# Patient Record
Sex: MALE | Race: WHITE | NOT HISPANIC OR LATINO | Employment: OTHER | URBAN - METROPOLITAN AREA
[De-identification: names, ages, dates, MRNs, and addresses within clinical notes are randomized per-mention and may not be internally consistent; named-entity substitution may affect disease eponyms.]

---

## 2018-06-15 ENCOUNTER — HOSPITAL ENCOUNTER (INPATIENT)
Facility: HOSPITAL | Age: 78
LOS: 19 days | Discharge: HOME | End: 2018-07-04
Attending: PHYSICAL MEDICINE & REHABILITATION | Admitting: PHYSICAL MEDICINE & REHABILITATION

## 2018-06-15 DIAGNOSIS — R47.1 DYSARTHRIA: ICD-10-CM

## 2018-06-15 RX ORDER — ONDANSETRON 4 MG/1
4 TABLET, FILM COATED ORAL EVERY 6 HOURS PRN
Status: DISCONTINUED | OUTPATIENT
Start: 2018-06-15 | End: 2018-07-04

## 2018-06-15 RX ORDER — AMLODIPINE BESYLATE 10 MG/1
10 TABLET ORAL
Status: DISCONTINUED | OUTPATIENT
Start: 2018-06-16 | End: 2018-07-04 | Stop reason: HOSPADM

## 2018-06-15 RX ORDER — PAROXETINE 30 MG/1
15 TABLET, FILM COATED ORAL DAILY
Status: DISCONTINUED | OUTPATIENT
Start: 2018-06-16 | End: 2018-06-22

## 2018-06-15 RX ORDER — ATORVASTATIN CALCIUM 80 MG/1
80 TABLET, FILM COATED ORAL NIGHTLY
Status: DISCONTINUED | OUTPATIENT
Start: 2018-06-15 | End: 2018-06-15

## 2018-06-15 RX ORDER — LEVOTHYROXINE SODIUM 112 UG/1
112 TABLET ORAL
Status: DISCONTINUED | OUTPATIENT
Start: 2018-06-16 | End: 2018-07-04 | Stop reason: HOSPADM

## 2018-06-15 RX ORDER — NITROGLYCERIN 0.4 MG/1
0.4 TABLET SUBLINGUAL
Status: DISCONTINUED | OUTPATIENT
Start: 2018-06-15 | End: 2018-07-04 | Stop reason: HOSPADM

## 2018-06-15 RX ORDER — PANTOPRAZOLE SODIUM 40 MG/1
40 TABLET, DELAYED RELEASE ORAL EVERY MORNING
Status: DISCONTINUED | OUTPATIENT
Start: 2018-06-16 | End: 2018-07-04 | Stop reason: HOSPADM

## 2018-06-15 RX ORDER — PAROXETINE 10 MG/1
15 TABLET, FILM COATED ORAL DAILY
Status: DISCONTINUED | OUTPATIENT
Start: 2018-06-15 | End: 2018-06-15

## 2018-06-15 RX ORDER — UREA 10 %
3 LOTION (ML) TOPICAL NIGHTLY PRN
Status: DISCONTINUED | OUTPATIENT
Start: 2018-06-15 | End: 2018-07-04 | Stop reason: HOSPADM

## 2018-06-15 RX ORDER — ATORVASTATIN CALCIUM 80 MG/1
80 TABLET, FILM COATED ORAL NIGHTLY
Status: DISCONTINUED | OUTPATIENT
Start: 2018-06-16 | End: 2018-06-30

## 2018-06-15 RX ORDER — CETIRIZINE HYDROCHLORIDE 10 MG/1
10 TABLET ORAL DAILY
Status: DISCONTINUED | OUTPATIENT
Start: 2018-06-16 | End: 2018-07-04 | Stop reason: HOSPADM

## 2018-06-15 RX ORDER — DIPHENOXYLATE HYDROCHLORIDE AND ATROPINE SULFATE 2.5; .025 MG/1; MG/1
1 TABLET ORAL DAILY
Status: DISCONTINUED | OUTPATIENT
Start: 2018-06-15 | End: 2018-07-04 | Stop reason: HOSPADM

## 2018-06-15 RX ORDER — CETIRIZINE HYDROCHLORIDE 10 MG/1
10 TABLET ORAL DAILY
Status: DISCONTINUED | OUTPATIENT
Start: 2018-06-15 | End: 2018-06-15

## 2018-06-15 RX ORDER — LISINOPRIL 20 MG/1
20 TABLET ORAL EVERY 12 HOURS SCHEDULED
Status: DISCONTINUED | OUTPATIENT
Start: 2018-06-15 | End: 2018-07-04 | Stop reason: HOSPADM

## 2018-06-15 RX ORDER — AMLODIPINE BESYLATE 10 MG/1
10 TABLET ORAL
Status: DISCONTINUED | OUTPATIENT
Start: 2018-06-15 | End: 2018-06-15

## 2018-06-15 RX ADMIN — Medication 3 MG: at 20:48

## 2018-06-15 RX ADMIN — Medication 1 TABLET: at 20:42

## 2018-06-15 RX ADMIN — LISINOPRIL 20 MG: 20 TABLET ORAL at 20:38

## 2018-06-15 RX ADMIN — METOPROLOL TARTRATE 25 MG: 25 TABLET ORAL at 20:39

## 2018-06-16 PROBLEM — I63.9 CVA (CEREBRAL VASCULAR ACCIDENT) (HCC): Status: ACTIVE | Noted: 2018-06-16

## 2018-06-16 PROCEDURE — 97167 OT EVAL HIGH COMPLEX 60 MIN: CPT | Performed by: OCCUPATIONAL THERAPIST

## 2018-06-16 PROCEDURE — 92610 EVALUATE SWALLOWING FUNCTION: CPT

## 2018-06-16 PROCEDURE — 97535 SELF CARE MNGMENT TRAINING: CPT | Performed by: OCCUPATIONAL THERAPIST

## 2018-06-16 PROCEDURE — 92522 EVALUATE SPEECH PRODUCTION: CPT

## 2018-06-16 PROCEDURE — 97161 PT EVAL LOW COMPLEX 20 MIN: CPT

## 2018-06-16 PROCEDURE — 97110 THERAPEUTIC EXERCISES: CPT

## 2018-06-16 RX ADMIN — METOPROLOL TARTRATE 25 MG: 25 TABLET ORAL at 09:04

## 2018-06-16 RX ADMIN — METOPROLOL TARTRATE 25 MG: 25 TABLET ORAL at 20:32

## 2018-06-16 RX ADMIN — Medication 1 TABLET: at 09:04

## 2018-06-16 RX ADMIN — LISINOPRIL 20 MG: 20 TABLET ORAL at 20:35

## 2018-06-16 RX ADMIN — NYSTATIN 500000 UNITS: 100000 SUSPENSION ORAL at 20:32

## 2018-06-16 RX ADMIN — ATORVASTATIN CALCIUM 80 MG: 80 TABLET, FILM COATED ORAL at 20:32

## 2018-06-16 RX ADMIN — LISINOPRIL 20 MG: 20 TABLET ORAL at 09:04

## 2018-06-16 RX ADMIN — PANTOPRAZOLE SODIUM 40 MG: 40 TABLET, DELAYED RELEASE ORAL at 06:29

## 2018-06-16 RX ADMIN — LEVOTHYROXINE SODIUM 112 MCG: 112 TABLET ORAL at 06:29

## 2018-06-16 RX ADMIN — Medication 3 MG: at 20:32

## 2018-06-16 RX ADMIN — NYSTATIN 500000 UNITS: 100000 SUSPENSION ORAL at 17:56

## 2018-06-16 RX ADMIN — AMLODIPINE BESYLATE 10 MG: 10 TABLET ORAL at 09:04

## 2018-06-16 RX ADMIN — PAROXETINE 15 MG: 30 TABLET, FILM COATED ORAL at 09:04

## 2018-06-17 RX ADMIN — LISINOPRIL 20 MG: 20 TABLET ORAL at 20:15

## 2018-06-17 RX ADMIN — LEVOTHYROXINE SODIUM 112 MCG: 112 TABLET ORAL at 06:06

## 2018-06-17 RX ADMIN — METOPROLOL TARTRATE 25 MG: 25 TABLET ORAL at 20:15

## 2018-06-17 RX ADMIN — METOPROLOL TARTRATE 25 MG: 25 TABLET ORAL at 08:18

## 2018-06-17 RX ADMIN — ATORVASTATIN CALCIUM 80 MG: 80 TABLET, FILM COATED ORAL at 20:15

## 2018-06-17 RX ADMIN — AMLODIPINE BESYLATE 10 MG: 10 TABLET ORAL at 08:18

## 2018-06-17 RX ADMIN — LISINOPRIL 20 MG: 20 TABLET ORAL at 08:18

## 2018-06-17 RX ADMIN — NYSTATIN 500000 UNITS: 100000 SUSPENSION ORAL at 08:18

## 2018-06-17 RX ADMIN — PAROXETINE 15 MG: 30 TABLET, FILM COATED ORAL at 08:18

## 2018-06-17 RX ADMIN — Medication 1 TABLET: at 08:18

## 2018-06-17 RX ADMIN — PANTOPRAZOLE SODIUM 40 MG: 40 TABLET, DELAYED RELEASE ORAL at 07:44

## 2018-06-17 RX ADMIN — NYSTATIN 500000 UNITS: 100000 SUSPENSION ORAL at 20:15

## 2018-06-17 RX ADMIN — NYSTATIN 500000 UNITS: 100000 SUSPENSION ORAL at 13:03

## 2018-06-18 ENCOUNTER — APPOINTMENT (OUTPATIENT)
Dept: GENERAL RADIOLOGY | Facility: HOSPITAL | Age: 78
End: 2018-06-18

## 2018-06-18 PROCEDURE — 97110 THERAPEUTIC EXERCISES: CPT

## 2018-06-18 PROCEDURE — 74230 X-RAY XM SWLNG FUNCJ C+: CPT

## 2018-06-18 PROCEDURE — 97112 NEUROMUSCULAR REEDUCATION: CPT | Performed by: OCCUPATIONAL THERAPIST

## 2018-06-18 PROCEDURE — 94799 UNLISTED PULMONARY SVC/PX: CPT

## 2018-06-18 PROCEDURE — 92611 MOTION FLUOROSCOPY/SWALLOW: CPT | Performed by: SPEECH-LANGUAGE PATHOLOGIST

## 2018-06-18 PROCEDURE — 97535 SELF CARE MNGMENT TRAINING: CPT | Performed by: OCCUPATIONAL THERAPIST

## 2018-06-18 RX ORDER — ACETAMINOPHEN 325 MG/1
650 TABLET ORAL EVERY 6 HOURS PRN
Status: DISCONTINUED | OUTPATIENT
Start: 2018-06-18 | End: 2018-07-04 | Stop reason: HOSPADM

## 2018-06-18 RX ADMIN — BARIUM SULFATE 50 ML: 400 SUSPENSION ORAL at 10:46

## 2018-06-18 RX ADMIN — NYSTATIN 500000 UNITS: 100000 SUSPENSION ORAL at 16:52

## 2018-06-18 RX ADMIN — PANTOPRAZOLE SODIUM 40 MG: 40 TABLET, DELAYED RELEASE ORAL at 05:55

## 2018-06-18 RX ADMIN — CETIRIZINE HYDROCHLORIDE 10 MG: 10 TABLET, FILM COATED ORAL at 09:25

## 2018-06-18 RX ADMIN — LISINOPRIL 20 MG: 20 TABLET ORAL at 20:33

## 2018-06-18 RX ADMIN — BARIUM SULFATE 4 ML: 980 POWDER, FOR SUSPENSION ORAL at 10:46

## 2018-06-18 RX ADMIN — LEVOTHYROXINE SODIUM 112 MCG: 112 TABLET ORAL at 05:55

## 2018-06-18 RX ADMIN — NYSTATIN 500000 UNITS: 100000 SUSPENSION ORAL at 09:26

## 2018-06-18 RX ADMIN — LISINOPRIL 20 MG: 20 TABLET ORAL at 09:25

## 2018-06-18 RX ADMIN — NYSTATIN 500000 UNITS: 100000 SUSPENSION ORAL at 20:33

## 2018-06-18 RX ADMIN — BARIUM SULFATE 65 ML: 960 POWDER, FOR SUSPENSION ORAL at 10:46

## 2018-06-18 RX ADMIN — Medication 1 TABLET: at 09:25

## 2018-06-18 RX ADMIN — ATORVASTATIN CALCIUM 80 MG: 80 TABLET, FILM COATED ORAL at 20:33

## 2018-06-18 RX ADMIN — METOPROLOL TARTRATE 25 MG: 25 TABLET ORAL at 09:25

## 2018-06-18 RX ADMIN — PAROXETINE 15 MG: 30 TABLET, FILM COATED ORAL at 09:26

## 2018-06-18 RX ADMIN — ACETAMINOPHEN 650 MG: 325 TABLET, FILM COATED ORAL at 16:52

## 2018-06-18 RX ADMIN — AMLODIPINE BESYLATE 10 MG: 10 TABLET ORAL at 09:25

## 2018-06-18 RX ADMIN — NYSTATIN 500000 UNITS: 100000 SUSPENSION ORAL at 12:31

## 2018-06-18 RX ADMIN — Medication 3 MG: at 20:36

## 2018-06-18 RX ADMIN — METOPROLOL TARTRATE 25 MG: 25 TABLET ORAL at 20:33

## 2018-06-19 PROCEDURE — 97535 SELF CARE MNGMENT TRAINING: CPT

## 2018-06-19 PROCEDURE — 96125 COGNITIVE TEST BY HC PRO: CPT

## 2018-06-19 PROCEDURE — 97110 THERAPEUTIC EXERCISES: CPT

## 2018-06-19 PROCEDURE — 92522 EVALUATE SPEECH PRODUCTION: CPT

## 2018-06-19 PROCEDURE — 97112 NEUROMUSCULAR REEDUCATION: CPT

## 2018-06-19 RX ADMIN — PANTOPRAZOLE SODIUM 40 MG: 40 TABLET, DELAYED RELEASE ORAL at 07:07

## 2018-06-19 RX ADMIN — CETIRIZINE HYDROCHLORIDE 10 MG: 10 TABLET, FILM COATED ORAL at 08:25

## 2018-06-19 RX ADMIN — LEVOTHYROXINE SODIUM 112 MCG: 112 TABLET ORAL at 06:02

## 2018-06-19 RX ADMIN — LISINOPRIL 20 MG: 20 TABLET ORAL at 20:21

## 2018-06-19 RX ADMIN — ATORVASTATIN CALCIUM 80 MG: 80 TABLET, FILM COATED ORAL at 20:20

## 2018-06-19 RX ADMIN — METOPROLOL TARTRATE 25 MG: 25 TABLET ORAL at 20:20

## 2018-06-19 RX ADMIN — PAROXETINE 15 MG: 30 TABLET, FILM COATED ORAL at 08:24

## 2018-06-19 RX ADMIN — NYSTATIN 500000 UNITS: 100000 SUSPENSION ORAL at 17:23

## 2018-06-19 RX ADMIN — Medication 1 TABLET: at 08:25

## 2018-06-19 RX ADMIN — LISINOPRIL 20 MG: 20 TABLET ORAL at 08:28

## 2018-06-19 RX ADMIN — NYSTATIN 500000 UNITS: 100000 SUSPENSION ORAL at 12:13

## 2018-06-19 RX ADMIN — METOPROLOL TARTRATE 25 MG: 25 TABLET ORAL at 08:28

## 2018-06-19 RX ADMIN — NYSTATIN 500000 UNITS: 100000 SUSPENSION ORAL at 08:24

## 2018-06-19 RX ADMIN — NYSTATIN 500000 UNITS: 100000 SUSPENSION ORAL at 20:20

## 2018-06-19 RX ADMIN — AMLODIPINE BESYLATE 10 MG: 10 TABLET ORAL at 08:27

## 2018-06-20 PROCEDURE — G0515 COGNITIVE SKILLS DEVELOPMENT: HCPCS

## 2018-06-20 PROCEDURE — 97110 THERAPEUTIC EXERCISES: CPT

## 2018-06-20 PROCEDURE — 97530 THERAPEUTIC ACTIVITIES: CPT

## 2018-06-20 PROCEDURE — 97535 SELF CARE MNGMENT TRAINING: CPT

## 2018-06-20 PROCEDURE — 97112 NEUROMUSCULAR REEDUCATION: CPT

## 2018-06-20 RX ORDER — POTASSIUM CHLORIDE 750 MG/1
10 TABLET, FILM COATED, EXTENDED RELEASE ORAL 2 TIMES DAILY WITH MEALS
COMMUNITY
End: 2018-07-04 | Stop reason: HOSPADM

## 2018-06-20 RX ORDER — LISINOPRIL 20 MG/1
20 TABLET ORAL 2 TIMES DAILY
COMMUNITY

## 2018-06-20 RX ORDER — OMEPRAZOLE 20 MG/1
20 CAPSULE, DELAYED RELEASE ORAL DAILY
COMMUNITY

## 2018-06-20 RX ORDER — LISINOPRIL 40 MG/1
40 TABLET ORAL DAILY
COMMUNITY
End: 2018-07-04 | Stop reason: HOSPADM

## 2018-06-20 RX ORDER — LEVOTHYROXINE SODIUM 0.1 MG/1
100 TABLET ORAL DAILY
COMMUNITY
End: 2018-07-04 | Stop reason: HOSPADM

## 2018-06-20 RX ORDER — POLYMYXIN B SULFATE AND TRIMETHOPRIM 1; 10000 MG/ML; [USP'U]/ML
SOLUTION OPHTHALMIC
COMMUNITY
End: 2018-07-04 | Stop reason: HOSPADM

## 2018-06-20 RX ORDER — ASPIRIN 81 MG/1
81 TABLET ORAL
COMMUNITY
End: 2018-07-04 | Stop reason: HOSPADM

## 2018-06-20 RX ORDER — CETIRIZINE HYDROCHLORIDE 10 MG/1
10 TABLET ORAL DAILY
COMMUNITY

## 2018-06-20 RX ORDER — NITROGLYCERIN 0.4 MG/1
0.4 TABLET SUBLINGUAL
COMMUNITY

## 2018-06-20 RX ORDER — ATORVASTATIN CALCIUM 80 MG/1
80 TABLET, FILM COATED ORAL DAILY
COMMUNITY
End: 2018-07-04 | Stop reason: HOSPADM

## 2018-06-20 RX ORDER — CHLORTHALIDONE 25 MG/1
25 TABLET ORAL
COMMUNITY
End: 2018-07-04 | Stop reason: HOSPADM

## 2018-06-20 RX ORDER — PAROXETINE 10 MG/1
15 TABLET, FILM COATED ORAL EVERY MORNING
Status: ON HOLD | COMMUNITY
End: 2018-07-04

## 2018-06-20 RX ORDER — LEVOTHYROXINE SODIUM 112 UG/1
112 TABLET ORAL DAILY
COMMUNITY

## 2018-06-20 RX ORDER — HYDRALAZINE HYDROCHLORIDE 10 MG/1
10 TABLET, FILM COATED ORAL EVERY 6 HOURS PRN
Status: DISCONTINUED | OUTPATIENT
Start: 2018-06-20 | End: 2018-07-04

## 2018-06-20 RX ADMIN — METOPROLOL TARTRATE 25 MG: 25 TABLET ORAL at 21:03

## 2018-06-20 RX ADMIN — LISINOPRIL 20 MG: 20 TABLET ORAL at 21:03

## 2018-06-20 RX ADMIN — NYSTATIN 500000 UNITS: 100000 SUSPENSION ORAL at 08:49

## 2018-06-20 RX ADMIN — PAROXETINE 15 MG: 30 TABLET, FILM COATED ORAL at 08:49

## 2018-06-20 RX ADMIN — CETIRIZINE HYDROCHLORIDE 10 MG: 10 TABLET, FILM COATED ORAL at 08:49

## 2018-06-20 RX ADMIN — NYSTATIN 500000 UNITS: 100000 SUSPENSION ORAL at 12:43

## 2018-06-20 RX ADMIN — Medication 3 MG: at 21:03

## 2018-06-20 RX ADMIN — LISINOPRIL 20 MG: 20 TABLET ORAL at 08:49

## 2018-06-20 RX ADMIN — ACETAMINOPHEN 650 MG: 325 TABLET, FILM COATED ORAL at 08:52

## 2018-06-20 RX ADMIN — NYSTATIN 500000 UNITS: 100000 SUSPENSION ORAL at 21:03

## 2018-06-20 RX ADMIN — PANTOPRAZOLE SODIUM 40 MG: 40 TABLET, DELAYED RELEASE ORAL at 06:49

## 2018-06-20 RX ADMIN — Medication 1 TABLET: at 08:49

## 2018-06-20 RX ADMIN — AMLODIPINE BESYLATE 10 MG: 10 TABLET ORAL at 08:49

## 2018-06-20 RX ADMIN — NYSTATIN 500000 UNITS: 100000 SUSPENSION ORAL at 17:32

## 2018-06-20 RX ADMIN — ATORVASTATIN CALCIUM 80 MG: 80 TABLET, FILM COATED ORAL at 21:03

## 2018-06-20 RX ADMIN — ACETAMINOPHEN 650 MG: 325 TABLET, FILM COATED ORAL at 15:25

## 2018-06-20 RX ADMIN — LEVOTHYROXINE SODIUM 112 MCG: 112 TABLET ORAL at 05:35

## 2018-06-20 RX ADMIN — METOPROLOL TARTRATE 25 MG: 25 TABLET ORAL at 08:49

## 2018-06-21 ENCOUNTER — APPOINTMENT (OUTPATIENT)
Dept: CT IMAGING | Facility: HOSPITAL | Age: 78
End: 2018-06-21
Attending: PHYSICAL MEDICINE & REHABILITATION

## 2018-06-21 PROCEDURE — 92507 TX SP LANG VOICE COMM INDIV: CPT

## 2018-06-21 PROCEDURE — 70450 CT HEAD/BRAIN W/O DYE: CPT

## 2018-06-21 PROCEDURE — 92526 ORAL FUNCTION THERAPY: CPT

## 2018-06-21 PROCEDURE — 97112 NEUROMUSCULAR REEDUCATION: CPT | Performed by: OCCUPATIONAL THERAPIST

## 2018-06-21 PROCEDURE — 97535 SELF CARE MNGMENT TRAINING: CPT

## 2018-06-21 PROCEDURE — 97110 THERAPEUTIC EXERCISES: CPT

## 2018-06-21 RX ORDER — HYDRALAZINE HYDROCHLORIDE 10 MG/1
10 TABLET, FILM COATED ORAL EVERY 8 HOURS SCHEDULED
Status: DISCONTINUED | OUTPATIENT
Start: 2018-06-21 | End: 2018-06-22

## 2018-06-21 RX ADMIN — PAROXETINE 15 MG: 30 TABLET, FILM COATED ORAL at 07:52

## 2018-06-21 RX ADMIN — NYSTATIN 500000 UNITS: 100000 SUSPENSION ORAL at 11:03

## 2018-06-21 RX ADMIN — METOPROLOL TARTRATE 25 MG: 25 TABLET ORAL at 20:36

## 2018-06-21 RX ADMIN — NYSTATIN 500000 UNITS: 100000 SUSPENSION ORAL at 07:52

## 2018-06-21 RX ADMIN — CETIRIZINE HYDROCHLORIDE 10 MG: 10 TABLET, FILM COATED ORAL at 07:52

## 2018-06-21 RX ADMIN — AMLODIPINE BESYLATE 10 MG: 10 TABLET ORAL at 07:52

## 2018-06-21 RX ADMIN — NYSTATIN 500000 UNITS: 100000 SUSPENSION ORAL at 17:29

## 2018-06-21 RX ADMIN — ATORVASTATIN CALCIUM 80 MG: 80 TABLET, FILM COATED ORAL at 20:36

## 2018-06-21 RX ADMIN — LISINOPRIL 20 MG: 20 TABLET ORAL at 20:36

## 2018-06-21 RX ADMIN — NYSTATIN 500000 UNITS: 100000 SUSPENSION ORAL at 20:37

## 2018-06-21 RX ADMIN — METOPROLOL TARTRATE 25 MG: 25 TABLET ORAL at 07:52

## 2018-06-21 RX ADMIN — Medication 1 TABLET: at 07:52

## 2018-06-21 RX ADMIN — LISINOPRIL 20 MG: 20 TABLET ORAL at 07:52

## 2018-06-21 RX ADMIN — PANTOPRAZOLE SODIUM 40 MG: 40 TABLET, DELAYED RELEASE ORAL at 05:26

## 2018-06-21 RX ADMIN — LEVOTHYROXINE SODIUM 112 MCG: 112 TABLET ORAL at 05:26

## 2018-06-21 RX ADMIN — HYDRALAZINE HYDROCHLORIDE 10 MG: 10 TABLET ORAL at 21:44

## 2018-06-22 PROCEDURE — 92507 TX SP LANG VOICE COMM INDIV: CPT

## 2018-06-22 PROCEDURE — 97110 THERAPEUTIC EXERCISES: CPT

## 2018-06-22 PROCEDURE — 97535 SELF CARE MNGMENT TRAINING: CPT

## 2018-06-22 PROCEDURE — 97112 NEUROMUSCULAR REEDUCATION: CPT

## 2018-06-22 PROCEDURE — 92526 ORAL FUNCTION THERAPY: CPT

## 2018-06-22 RX ORDER — PAROXETINE 10 MG/1
5 TABLET, FILM COATED ORAL DAILY
Status: DISCONTINUED | OUTPATIENT
Start: 2018-07-07 | End: 2018-07-04 | Stop reason: HOSPADM

## 2018-06-22 RX ORDER — HYDRALAZINE HYDROCHLORIDE 25 MG/1
25 TABLET, FILM COATED ORAL EVERY 8 HOURS SCHEDULED
Status: DISCONTINUED | OUTPATIENT
Start: 2018-06-22 | End: 2018-07-01

## 2018-06-22 RX ORDER — PAROXETINE 10 MG/1
10 TABLET, FILM COATED ORAL DAILY
Status: COMPLETED | OUTPATIENT
Start: 2018-06-23 | End: 2018-06-29

## 2018-06-22 RX ADMIN — ACETAMINOPHEN 650 MG: 325 TABLET, FILM COATED ORAL at 22:44

## 2018-06-22 RX ADMIN — HYDRALAZINE HYDROCHLORIDE 10 MG: 10 TABLET ORAL at 05:35

## 2018-06-22 RX ADMIN — METOPROLOL TARTRATE 25 MG: 25 TABLET ORAL at 20:20

## 2018-06-22 RX ADMIN — NYSTATIN 500000 UNITS: 100000 SUSPENSION ORAL at 07:41

## 2018-06-22 RX ADMIN — ATORVASTATIN CALCIUM 80 MG: 80 TABLET, FILM COATED ORAL at 20:20

## 2018-06-22 RX ADMIN — LEVOTHYROXINE SODIUM 112 MCG: 112 TABLET ORAL at 05:34

## 2018-06-22 RX ADMIN — METOPROLOL TARTRATE 25 MG: 25 TABLET ORAL at 07:41

## 2018-06-22 RX ADMIN — AMLODIPINE BESYLATE 10 MG: 10 TABLET ORAL at 07:41

## 2018-06-22 RX ADMIN — LISINOPRIL 20 MG: 20 TABLET ORAL at 20:20

## 2018-06-22 RX ADMIN — HYDRALAZINE HYDROCHLORIDE 25 MG: 25 TABLET, FILM COATED ORAL at 20:20

## 2018-06-22 RX ADMIN — LISINOPRIL 20 MG: 20 TABLET ORAL at 07:41

## 2018-06-22 RX ADMIN — PANTOPRAZOLE SODIUM 40 MG: 40 TABLET, DELAYED RELEASE ORAL at 05:33

## 2018-06-22 RX ADMIN — PAROXETINE 15 MG: 30 TABLET, FILM COATED ORAL at 07:41

## 2018-06-22 RX ADMIN — NYSTATIN 500000 UNITS: 100000 SUSPENSION ORAL at 20:21

## 2018-06-22 RX ADMIN — HYDRALAZINE HYDROCHLORIDE 25 MG: 25 TABLET, FILM COATED ORAL at 15:03

## 2018-06-22 RX ADMIN — Medication 1 TABLET: at 07:41

## 2018-06-22 RX ADMIN — NYSTATIN 500000 UNITS: 100000 SUSPENSION ORAL at 12:51

## 2018-06-22 RX ADMIN — CETIRIZINE HYDROCHLORIDE 10 MG: 10 TABLET, FILM COATED ORAL at 07:41

## 2018-06-22 RX ADMIN — NYSTATIN 500000 UNITS: 100000 SUSPENSION ORAL at 17:51

## 2018-06-23 LAB
ANION GAP SERPL CALCULATED.3IONS-SCNC: 14.6 MMOL/L
BASOPHILS # BLD AUTO: 0.03 10*3/MM3 (ref 0–0.2)
BASOPHILS NFR BLD AUTO: 0.4 % (ref 0–1.5)
BUN BLD-MCNC: 24 MG/DL (ref 8–23)
BUN/CREAT SERPL: 19.2 (ref 7–25)
CALCIUM SPEC-SCNC: 9.1 MG/DL (ref 8.6–10.5)
CHLORIDE SERPL-SCNC: 103 MMOL/L (ref 98–107)
CO2 SERPL-SCNC: 25.4 MMOL/L (ref 22–29)
CREAT BLD-MCNC: 1.25 MG/DL (ref 0.76–1.27)
DEPRECATED RDW RBC AUTO: 39.9 FL (ref 37–54)
EOSINOPHIL # BLD AUTO: 0.22 10*3/MM3 (ref 0–0.7)
EOSINOPHIL NFR BLD AUTO: 2.8 % (ref 0.3–6.2)
ERYTHROCYTE [DISTWIDTH] IN BLOOD BY AUTOMATED COUNT: 11.8 % (ref 11.5–14.5)
GFR SERPL CREATININE-BSD FRML MDRD: 56 ML/MIN/1.73
GLUCOSE BLD-MCNC: 149 MG/DL (ref 65–99)
HCT VFR BLD AUTO: 39.9 % (ref 40.4–52.2)
HGB BLD-MCNC: 13 G/DL (ref 13.7–17.6)
IMM GRANULOCYTES # BLD: 0 10*3/MM3 (ref 0–0.03)
IMM GRANULOCYTES NFR BLD: 0 % (ref 0–0.5)
LYMPHOCYTES # BLD AUTO: 1.58 10*3/MM3 (ref 0.9–4.8)
LYMPHOCYTES NFR BLD AUTO: 20.1 % (ref 19.6–45.3)
MCH RBC QN AUTO: 30.7 PG (ref 27–32.7)
MCHC RBC AUTO-ENTMCNC: 32.6 G/DL (ref 32.6–36.4)
MCV RBC AUTO: 94.1 FL (ref 79.8–96.2)
MONOCYTES # BLD AUTO: 0.64 10*3/MM3 (ref 0.2–1.2)
MONOCYTES NFR BLD AUTO: 8.2 % (ref 5–12)
NEUTROPHILS # BLD AUTO: 5.38 10*3/MM3 (ref 1.9–8.1)
NEUTROPHILS NFR BLD AUTO: 68.5 % (ref 42.7–76)
PLATELET # BLD AUTO: 188 10*3/MM3 (ref 140–500)
PMV BLD AUTO: 10.6 FL (ref 6–12)
POTASSIUM BLD-SCNC: 3.5 MMOL/L (ref 3.5–5.2)
RBC # BLD AUTO: 4.24 10*6/MM3 (ref 4.6–6)
SODIUM BLD-SCNC: 143 MMOL/L (ref 136–145)
WBC NRBC COR # BLD: 7.85 10*3/MM3 (ref 4.5–10.7)

## 2018-06-23 PROCEDURE — 80048 BASIC METABOLIC PNL TOTAL CA: CPT | Performed by: PHYSICAL MEDICINE & REHABILITATION

## 2018-06-23 PROCEDURE — 97110 THERAPEUTIC EXERCISES: CPT

## 2018-06-23 PROCEDURE — 85025 COMPLETE CBC W/AUTO DIFF WBC: CPT | Performed by: PHYSICAL MEDICINE & REHABILITATION

## 2018-06-23 RX ADMIN — HYDRALAZINE HYDROCHLORIDE 25 MG: 25 TABLET, FILM COATED ORAL at 06:10

## 2018-06-23 RX ADMIN — PAROXETINE HYDROCHLORIDE 10 MG: 10 TABLET, FILM COATED ORAL at 08:20

## 2018-06-23 RX ADMIN — HYDRALAZINE HYDROCHLORIDE 25 MG: 25 TABLET, FILM COATED ORAL at 14:00

## 2018-06-23 RX ADMIN — LEVOTHYROXINE SODIUM 112 MCG: 112 TABLET ORAL at 06:10

## 2018-06-23 RX ADMIN — NYSTATIN 500000 UNITS: 100000 SUSPENSION ORAL at 17:33

## 2018-06-23 RX ADMIN — METOPROLOL TARTRATE 25 MG: 25 TABLET ORAL at 08:20

## 2018-06-23 RX ADMIN — NYSTATIN 500000 UNITS: 100000 SUSPENSION ORAL at 20:25

## 2018-06-23 RX ADMIN — LISINOPRIL 20 MG: 20 TABLET ORAL at 08:20

## 2018-06-23 RX ADMIN — PANTOPRAZOLE SODIUM 40 MG: 40 TABLET, DELAYED RELEASE ORAL at 06:10

## 2018-06-23 RX ADMIN — METOPROLOL TARTRATE 25 MG: 25 TABLET ORAL at 20:26

## 2018-06-23 RX ADMIN — ATORVASTATIN CALCIUM 80 MG: 80 TABLET, FILM COATED ORAL at 20:26

## 2018-06-23 RX ADMIN — ACETAMINOPHEN 650 MG: 325 TABLET, FILM COATED ORAL at 20:25

## 2018-06-23 RX ADMIN — NYSTATIN 500000 UNITS: 100000 SUSPENSION ORAL at 11:20

## 2018-06-23 RX ADMIN — LISINOPRIL 20 MG: 20 TABLET ORAL at 20:26

## 2018-06-23 RX ADMIN — CETIRIZINE HYDROCHLORIDE 10 MG: 10 TABLET, FILM COATED ORAL at 08:20

## 2018-06-23 RX ADMIN — HYDRALAZINE HYDROCHLORIDE 25 MG: 25 TABLET, FILM COATED ORAL at 20:25

## 2018-06-23 RX ADMIN — Medication 1 TABLET: at 08:20

## 2018-06-23 RX ADMIN — AMLODIPINE BESYLATE 10 MG: 10 TABLET ORAL at 08:20

## 2018-06-24 RX ORDER — CORN STARCH 83.7 %
POWDER (GRAM) TOPICAL 2 TIMES DAILY PRN
Status: DISCONTINUED | OUTPATIENT
Start: 2018-06-24 | End: 2018-07-04

## 2018-06-24 RX ADMIN — METOPROLOL TARTRATE 25 MG: 25 TABLET ORAL at 08:36

## 2018-06-24 RX ADMIN — HYDRALAZINE HYDROCHLORIDE 25 MG: 25 TABLET, FILM COATED ORAL at 13:43

## 2018-06-24 RX ADMIN — PAROXETINE HYDROCHLORIDE 10 MG: 10 TABLET, FILM COATED ORAL at 08:37

## 2018-06-24 RX ADMIN — HYDRALAZINE HYDROCHLORIDE 25 MG: 25 TABLET, FILM COATED ORAL at 21:04

## 2018-06-24 RX ADMIN — LEVOTHYROXINE SODIUM 112 MCG: 112 TABLET ORAL at 05:29

## 2018-06-24 RX ADMIN — NYSTATIN 500000 UNITS: 100000 SUSPENSION ORAL at 17:03

## 2018-06-24 RX ADMIN — AMLODIPINE BESYLATE 10 MG: 10 TABLET ORAL at 08:36

## 2018-06-24 RX ADMIN — HYDRALAZINE HYDROCHLORIDE 25 MG: 25 TABLET, FILM COATED ORAL at 05:29

## 2018-06-24 RX ADMIN — PANTOPRAZOLE SODIUM 40 MG: 40 TABLET, DELAYED RELEASE ORAL at 05:29

## 2018-06-24 RX ADMIN — NYSTATIN 500000 UNITS: 100000 SUSPENSION ORAL at 12:03

## 2018-06-24 RX ADMIN — NYSTATIN 500000 UNITS: 100000 SUSPENSION ORAL at 20:19

## 2018-06-24 RX ADMIN — LISINOPRIL 20 MG: 20 TABLET ORAL at 08:36

## 2018-06-24 RX ADMIN — LISINOPRIL 20 MG: 20 TABLET ORAL at 20:19

## 2018-06-24 RX ADMIN — METOPROLOL TARTRATE 25 MG: 25 TABLET ORAL at 20:18

## 2018-06-24 RX ADMIN — Medication 1 TABLET: at 08:36

## 2018-06-24 RX ADMIN — ATORVASTATIN CALCIUM 80 MG: 80 TABLET, FILM COATED ORAL at 20:18

## 2018-06-24 RX ADMIN — NYSTATIN 500000 UNITS: 100000 SUSPENSION ORAL at 08:36

## 2018-06-24 RX ADMIN — ACETAMINOPHEN 650 MG: 325 TABLET, FILM COATED ORAL at 20:19

## 2018-06-25 PROCEDURE — 92507 TX SP LANG VOICE COMM INDIV: CPT

## 2018-06-25 PROCEDURE — 92526 ORAL FUNCTION THERAPY: CPT

## 2018-06-25 PROCEDURE — 97535 SELF CARE MNGMENT TRAINING: CPT

## 2018-06-25 PROCEDURE — 97110 THERAPEUTIC EXERCISES: CPT

## 2018-06-25 RX ADMIN — PAROXETINE HYDROCHLORIDE 10 MG: 10 TABLET, FILM COATED ORAL at 07:59

## 2018-06-25 RX ADMIN — METOPROLOL TARTRATE 25 MG: 25 TABLET ORAL at 21:43

## 2018-06-25 RX ADMIN — CETIRIZINE HYDROCHLORIDE 10 MG: 10 TABLET, FILM COATED ORAL at 07:59

## 2018-06-25 RX ADMIN — LEVOTHYROXINE SODIUM 112 MCG: 112 TABLET ORAL at 05:41

## 2018-06-25 RX ADMIN — ATORVASTATIN CALCIUM 80 MG: 80 TABLET, FILM COATED ORAL at 21:02

## 2018-06-25 RX ADMIN — AMLODIPINE BESYLATE 10 MG: 10 TABLET ORAL at 07:59

## 2018-06-25 RX ADMIN — HYDRALAZINE HYDROCHLORIDE 25 MG: 25 TABLET, FILM COATED ORAL at 05:41

## 2018-06-25 RX ADMIN — LISINOPRIL 20 MG: 20 TABLET ORAL at 21:03

## 2018-06-25 RX ADMIN — ACETAMINOPHEN 650 MG: 325 TABLET, FILM COATED ORAL at 07:58

## 2018-06-25 RX ADMIN — METOPROLOL TARTRATE 25 MG: 25 TABLET ORAL at 07:59

## 2018-06-25 RX ADMIN — HYDRALAZINE HYDROCHLORIDE 25 MG: 25 TABLET, FILM COATED ORAL at 15:11

## 2018-06-25 RX ADMIN — Medication: at 08:52

## 2018-06-25 RX ADMIN — Medication 1 TABLET: at 07:59

## 2018-06-25 RX ADMIN — PANTOPRAZOLE SODIUM 40 MG: 40 TABLET, DELAYED RELEASE ORAL at 05:41

## 2018-06-25 RX ADMIN — LISINOPRIL 20 MG: 20 TABLET ORAL at 08:00

## 2018-06-25 RX ADMIN — HYDRALAZINE HYDROCHLORIDE 25 MG: 25 TABLET, FILM COATED ORAL at 21:02

## 2018-06-25 RX ADMIN — NYSTATIN 500000 UNITS: 100000 SUSPENSION ORAL at 21:02

## 2018-06-26 PROCEDURE — 97112 NEUROMUSCULAR REEDUCATION: CPT

## 2018-06-26 PROCEDURE — 92507 TX SP LANG VOICE COMM INDIV: CPT

## 2018-06-26 PROCEDURE — 97110 THERAPEUTIC EXERCISES: CPT

## 2018-06-26 PROCEDURE — 97535 SELF CARE MNGMENT TRAINING: CPT

## 2018-06-26 PROCEDURE — 92526 ORAL FUNCTION THERAPY: CPT

## 2018-06-26 RX ADMIN — ATORVASTATIN CALCIUM 80 MG: 80 TABLET, FILM COATED ORAL at 20:54

## 2018-06-26 RX ADMIN — LISINOPRIL 20 MG: 20 TABLET ORAL at 20:54

## 2018-06-26 RX ADMIN — HYDRALAZINE HYDROCHLORIDE 25 MG: 25 TABLET, FILM COATED ORAL at 20:54

## 2018-06-26 RX ADMIN — LISINOPRIL 20 MG: 20 TABLET ORAL at 08:23

## 2018-06-26 RX ADMIN — AMLODIPINE BESYLATE 10 MG: 10 TABLET ORAL at 08:23

## 2018-06-26 RX ADMIN — Medication 1 TABLET: at 08:23

## 2018-06-26 RX ADMIN — PAROXETINE HYDROCHLORIDE 10 MG: 10 TABLET, FILM COATED ORAL at 08:23

## 2018-06-26 RX ADMIN — HYDRALAZINE HYDROCHLORIDE 25 MG: 25 TABLET, FILM COATED ORAL at 14:36

## 2018-06-26 RX ADMIN — NYSTATIN 500000 UNITS: 100000 SUSPENSION ORAL at 08:22

## 2018-06-26 RX ADMIN — PANTOPRAZOLE SODIUM 40 MG: 40 TABLET, DELAYED RELEASE ORAL at 05:23

## 2018-06-26 RX ADMIN — LEVOTHYROXINE SODIUM 112 MCG: 112 TABLET ORAL at 05:23

## 2018-06-26 RX ADMIN — NYSTATIN 500000 UNITS: 100000 SUSPENSION ORAL at 11:44

## 2018-06-26 RX ADMIN — METOPROLOL TARTRATE 25 MG: 25 TABLET ORAL at 20:54

## 2018-06-26 RX ADMIN — HYDRALAZINE HYDROCHLORIDE 25 MG: 25 TABLET, FILM COATED ORAL at 05:23

## 2018-06-26 RX ADMIN — METOPROLOL TARTRATE 25 MG: 25 TABLET ORAL at 08:23

## 2018-06-27 PROCEDURE — 97535 SELF CARE MNGMENT TRAINING: CPT

## 2018-06-27 PROCEDURE — 92526 ORAL FUNCTION THERAPY: CPT

## 2018-06-27 PROCEDURE — 97110 THERAPEUTIC EXERCISES: CPT

## 2018-06-27 PROCEDURE — 92507 TX SP LANG VOICE COMM INDIV: CPT

## 2018-06-27 RX ADMIN — ACETAMINOPHEN 650 MG: 325 TABLET, FILM COATED ORAL at 08:48

## 2018-06-27 RX ADMIN — HYDRALAZINE HYDROCHLORIDE 25 MG: 25 TABLET, FILM COATED ORAL at 06:04

## 2018-06-27 RX ADMIN — PAROXETINE HYDROCHLORIDE 10 MG: 10 TABLET, FILM COATED ORAL at 08:48

## 2018-06-27 RX ADMIN — METOPROLOL TARTRATE 25 MG: 25 TABLET ORAL at 08:47

## 2018-06-27 RX ADMIN — HYDRALAZINE HYDROCHLORIDE 25 MG: 25 TABLET, FILM COATED ORAL at 21:48

## 2018-06-27 RX ADMIN — LEVOTHYROXINE SODIUM 112 MCG: 112 TABLET ORAL at 06:04

## 2018-06-27 RX ADMIN — LISINOPRIL 20 MG: 20 TABLET ORAL at 19:49

## 2018-06-27 RX ADMIN — PANTOPRAZOLE SODIUM 40 MG: 40 TABLET, DELAYED RELEASE ORAL at 06:04

## 2018-06-27 RX ADMIN — HYDRALAZINE HYDROCHLORIDE 25 MG: 25 TABLET, FILM COATED ORAL at 14:40

## 2018-06-27 RX ADMIN — ACETAMINOPHEN 650 MG: 325 TABLET, FILM COATED ORAL at 19:02

## 2018-06-27 RX ADMIN — Medication 3 MG: at 19:49

## 2018-06-27 RX ADMIN — ATORVASTATIN CALCIUM 80 MG: 80 TABLET, FILM COATED ORAL at 19:50

## 2018-06-27 RX ADMIN — LISINOPRIL 20 MG: 20 TABLET ORAL at 08:48

## 2018-06-27 RX ADMIN — METOPROLOL TARTRATE 25 MG: 25 TABLET ORAL at 19:50

## 2018-06-27 RX ADMIN — Medication 1 TABLET: at 08:47

## 2018-06-27 RX ADMIN — AMLODIPINE BESYLATE 10 MG: 10 TABLET ORAL at 08:47

## 2018-06-28 ENCOUNTER — APPOINTMENT (OUTPATIENT)
Dept: CARDIOLOGY | Facility: HOSPITAL | Age: 78
End: 2018-06-28
Attending: PHYSICAL MEDICINE & REHABILITATION

## 2018-06-28 ENCOUNTER — APPOINTMENT (OUTPATIENT)
Dept: GENERAL RADIOLOGY | Facility: HOSPITAL | Age: 78
End: 2018-06-28
Attending: PHYSICAL MEDICINE & REHABILITATION

## 2018-06-28 LAB
BASOPHILS # BLD AUTO: 0.02 10*3/MM3 (ref 0–0.2)
BASOPHILS NFR BLD AUTO: 0.3 % (ref 0–1.5)
BILIRUB UR QL STRIP: NEGATIVE
CLARITY UR: CLEAR
COLOR UR: YELLOW
DEPRECATED RDW RBC AUTO: 41 FL (ref 37–54)
EOSINOPHIL # BLD AUTO: 0.32 10*3/MM3 (ref 0–0.7)
EOSINOPHIL NFR BLD AUTO: 4.1 % (ref 0.3–6.2)
ERYTHROCYTE [DISTWIDTH] IN BLOOD BY AUTOMATED COUNT: 12.1 % (ref 11.5–14.5)
GLUCOSE UR STRIP-MCNC: NEGATIVE MG/DL
HCT VFR BLD AUTO: 40.3 % (ref 40.4–52.2)
HGB BLD-MCNC: 13.8 G/DL (ref 13.7–17.6)
HGB UR QL STRIP.AUTO: NEGATIVE
IMM GRANULOCYTES # BLD: 0.02 10*3/MM3 (ref 0–0.03)
IMM GRANULOCYTES NFR BLD: 0.3 % (ref 0–0.5)
KETONES UR QL STRIP: NEGATIVE
LEUKOCYTE ESTERASE UR QL STRIP.AUTO: NEGATIVE
LYMPHOCYTES # BLD AUTO: 0.53 10*3/MM3 (ref 0.9–4.8)
LYMPHOCYTES NFR BLD AUTO: 6.8 % (ref 19.6–45.3)
MCH RBC QN AUTO: 31.8 PG (ref 27–32.7)
MCHC RBC AUTO-ENTMCNC: 34.2 G/DL (ref 32.6–36.4)
MCV RBC AUTO: 92.9 FL (ref 79.8–96.2)
MONOCYTES # BLD AUTO: 0.43 10*3/MM3 (ref 0.2–1.2)
MONOCYTES NFR BLD AUTO: 5.5 % (ref 5–12)
NEUTROPHILS # BLD AUTO: 6.46 10*3/MM3 (ref 1.9–8.1)
NEUTROPHILS NFR BLD AUTO: 83.3 % (ref 42.7–76)
NITRITE UR QL STRIP: NEGATIVE
PH UR STRIP.AUTO: 8 [PH] (ref 5–8)
PLATELET # BLD AUTO: 137 10*3/MM3 (ref 140–500)
PMV BLD AUTO: 11 FL (ref 6–12)
PROCALCITONIN SERPL-MCNC: 0.24 NG/ML (ref 0.1–0.25)
PROT UR QL STRIP: NEGATIVE
RBC # BLD AUTO: 4.34 10*6/MM3 (ref 4.6–6)
SP GR UR STRIP: 1.01 (ref 1–1.03)
UROBILINOGEN UR QL STRIP: NORMAL
WBC NRBC COR # BLD: 7.76 10*3/MM3 (ref 4.5–10.7)

## 2018-06-28 PROCEDURE — 97535 SELF CARE MNGMENT TRAINING: CPT | Performed by: OCCUPATIONAL THERAPIST

## 2018-06-28 PROCEDURE — 97112 NEUROMUSCULAR REEDUCATION: CPT | Performed by: OCCUPATIONAL THERAPIST

## 2018-06-28 PROCEDURE — 71046 X-RAY EXAM CHEST 2 VIEWS: CPT

## 2018-06-28 PROCEDURE — 97110 THERAPEUTIC EXERCISES: CPT

## 2018-06-28 PROCEDURE — 87040 BLOOD CULTURE FOR BACTERIA: CPT | Performed by: PHYSICAL MEDICINE & REHABILITATION

## 2018-06-28 PROCEDURE — 92526 ORAL FUNCTION THERAPY: CPT

## 2018-06-28 PROCEDURE — 93970 EXTREMITY STUDY: CPT

## 2018-06-28 PROCEDURE — 85025 COMPLETE CBC W/AUTO DIFF WBC: CPT | Performed by: PHYSICAL MEDICINE & REHABILITATION

## 2018-06-28 PROCEDURE — 81003 URINALYSIS AUTO W/O SCOPE: CPT | Performed by: PHYSICAL MEDICINE & REHABILITATION

## 2018-06-28 PROCEDURE — 92507 TX SP LANG VOICE COMM INDIV: CPT | Performed by: SPEECH-LANGUAGE PATHOLOGIST

## 2018-06-28 PROCEDURE — 84145 PROCALCITONIN (PCT): CPT | Performed by: PHYSICAL MEDICINE & REHABILITATION

## 2018-06-28 RX ADMIN — METOPROLOL TARTRATE 25 MG: 25 TABLET ORAL at 20:58

## 2018-06-28 RX ADMIN — ACETAMINOPHEN 650 MG: 325 TABLET, FILM COATED ORAL at 16:04

## 2018-06-28 RX ADMIN — HYDRALAZINE HYDROCHLORIDE 10 MG: 10 TABLET, FILM COATED ORAL at 16:04

## 2018-06-28 RX ADMIN — Medication 3 MG: at 22:02

## 2018-06-28 RX ADMIN — ACETAMINOPHEN 650 MG: 325 TABLET, FILM COATED ORAL at 22:04

## 2018-06-28 RX ADMIN — CETIRIZINE HYDROCHLORIDE 10 MG: 10 TABLET, FILM COATED ORAL at 07:55

## 2018-06-28 RX ADMIN — Medication 1 TABLET: at 07:55

## 2018-06-28 RX ADMIN — HYDRALAZINE HYDROCHLORIDE 25 MG: 25 TABLET, FILM COATED ORAL at 14:56

## 2018-06-28 RX ADMIN — PANTOPRAZOLE SODIUM 40 MG: 40 TABLET, DELAYED RELEASE ORAL at 05:43

## 2018-06-28 RX ADMIN — ACETAMINOPHEN 650 MG: 325 TABLET, FILM COATED ORAL at 07:32

## 2018-06-28 RX ADMIN — HYDRALAZINE HYDROCHLORIDE 25 MG: 25 TABLET, FILM COATED ORAL at 05:43

## 2018-06-28 RX ADMIN — PAROXETINE HYDROCHLORIDE 10 MG: 10 TABLET, FILM COATED ORAL at 07:55

## 2018-06-28 RX ADMIN — ATORVASTATIN CALCIUM 80 MG: 80 TABLET, FILM COATED ORAL at 20:58

## 2018-06-28 RX ADMIN — LISINOPRIL 20 MG: 20 TABLET ORAL at 07:55

## 2018-06-28 RX ADMIN — LISINOPRIL 20 MG: 20 TABLET ORAL at 20:58

## 2018-06-28 RX ADMIN — LEVOTHYROXINE SODIUM 112 MCG: 112 TABLET ORAL at 05:42

## 2018-06-28 RX ADMIN — METOPROLOL TARTRATE 25 MG: 25 TABLET ORAL at 07:56

## 2018-06-28 RX ADMIN — AMLODIPINE BESYLATE 10 MG: 10 TABLET ORAL at 07:55

## 2018-06-28 RX ADMIN — HYDRALAZINE HYDROCHLORIDE 25 MG: 25 TABLET, FILM COATED ORAL at 20:58

## 2018-06-29 ENCOUNTER — APPOINTMENT (OUTPATIENT)
Dept: GENERAL RADIOLOGY | Facility: HOSPITAL | Age: 78
End: 2018-06-29

## 2018-06-29 LAB
ALBUMIN SERPL-MCNC: 3.8 G/DL (ref 3.5–5.2)
ALBUMIN/GLOB SERPL: 1.4 G/DL
ALP SERPL-CCNC: 130 U/L (ref 39–117)
ALT SERPL W P-5'-P-CCNC: 127 U/L (ref 1–41)
ANION GAP SERPL CALCULATED.3IONS-SCNC: 14.1 MMOL/L
AST SERPL-CCNC: 46 U/L (ref 1–40)
BH CV LOWER VASCULAR LEFT COMMON FEMORAL AUGMENT: NORMAL
BH CV LOWER VASCULAR LEFT COMMON FEMORAL COMPETENT: NORMAL
BH CV LOWER VASCULAR LEFT COMMON FEMORAL COMPRESS: NORMAL
BH CV LOWER VASCULAR LEFT COMMON FEMORAL PHASIC: NORMAL
BH CV LOWER VASCULAR LEFT COMMON FEMORAL SPONT: NORMAL
BH CV LOWER VASCULAR LEFT DISTAL FEMORAL COMPRESS: NORMAL
BH CV LOWER VASCULAR LEFT GASTRONEMIUS COMPRESS: NORMAL
BH CV LOWER VASCULAR LEFT GREATER SAPH AK COMPRESS: NORMAL
BH CV LOWER VASCULAR LEFT GREATER SAPH BK COMPRESS: NORMAL
BH CV LOWER VASCULAR LEFT MID FEMORAL AUGMENT: NORMAL
BH CV LOWER VASCULAR LEFT MID FEMORAL COMPETENT: NORMAL
BH CV LOWER VASCULAR LEFT MID FEMORAL COMPRESS: NORMAL
BH CV LOWER VASCULAR LEFT MID FEMORAL PHASIC: NORMAL
BH CV LOWER VASCULAR LEFT MID FEMORAL SPONT: NORMAL
BH CV LOWER VASCULAR LEFT PERONEAL COMPRESS: NORMAL
BH CV LOWER VASCULAR LEFT POPLITEAL AUGMENT: NORMAL
BH CV LOWER VASCULAR LEFT POPLITEAL COMPETENT: NORMAL
BH CV LOWER VASCULAR LEFT POPLITEAL COMPRESS: NORMAL
BH CV LOWER VASCULAR LEFT POPLITEAL PHASIC: NORMAL
BH CV LOWER VASCULAR LEFT POPLITEAL SPONT: NORMAL
BH CV LOWER VASCULAR LEFT POSTERIOR TIBIAL COMPRESS: NORMAL
BH CV LOWER VASCULAR LEFT PROXIMAL FEMORAL COMPRESS: NORMAL
BH CV LOWER VASCULAR LEFT SAPHENOFEMORAL JUNCTION AUGMENT: NORMAL
BH CV LOWER VASCULAR LEFT SAPHENOFEMORAL JUNCTION COMPETENT: NORMAL
BH CV LOWER VASCULAR LEFT SAPHENOFEMORAL JUNCTION COMPRESS: NORMAL
BH CV LOWER VASCULAR LEFT SAPHENOFEMORAL JUNCTION PHASIC: NORMAL
BH CV LOWER VASCULAR LEFT SAPHENOFEMORAL JUNCTION SPONT: NORMAL
BH CV LOWER VASCULAR RIGHT COMMON FEMORAL AUGMENT: NORMAL
BH CV LOWER VASCULAR RIGHT COMMON FEMORAL COMPETENT: NORMAL
BH CV LOWER VASCULAR RIGHT COMMON FEMORAL COMPRESS: NORMAL
BH CV LOWER VASCULAR RIGHT COMMON FEMORAL PHASIC: NORMAL
BH CV LOWER VASCULAR RIGHT COMMON FEMORAL SPONT: NORMAL
BH CV LOWER VASCULAR RIGHT DISTAL FEMORAL COMPRESS: NORMAL
BH CV LOWER VASCULAR RIGHT GASTRONEMIUS COMPRESS: NORMAL
BH CV LOWER VASCULAR RIGHT GREATER SAPH AK COMPRESS: NORMAL
BH CV LOWER VASCULAR RIGHT GREATER SAPH BK COMPRESS: NORMAL
BH CV LOWER VASCULAR RIGHT MID FEMORAL AUGMENT: NORMAL
BH CV LOWER VASCULAR RIGHT MID FEMORAL COMPETENT: NORMAL
BH CV LOWER VASCULAR RIGHT MID FEMORAL COMPRESS: NORMAL
BH CV LOWER VASCULAR RIGHT MID FEMORAL PHASIC: NORMAL
BH CV LOWER VASCULAR RIGHT MID FEMORAL SPONT: NORMAL
BH CV LOWER VASCULAR RIGHT PERONEAL COMPRESS: NORMAL
BH CV LOWER VASCULAR RIGHT POPLITEAL AUGMENT: NORMAL
BH CV LOWER VASCULAR RIGHT POPLITEAL COMPETENT: NORMAL
BH CV LOWER VASCULAR RIGHT POPLITEAL COMPRESS: NORMAL
BH CV LOWER VASCULAR RIGHT POPLITEAL PHASIC: NORMAL
BH CV LOWER VASCULAR RIGHT POPLITEAL SPONT: NORMAL
BH CV LOWER VASCULAR RIGHT POSTERIOR TIBIAL COMPRESS: NORMAL
BH CV LOWER VASCULAR RIGHT PROXIMAL FEMORAL COMPRESS: NORMAL
BH CV LOWER VASCULAR RIGHT SAPHENOFEMORAL JUNCTION AUGMENT: NORMAL
BH CV LOWER VASCULAR RIGHT SAPHENOFEMORAL JUNCTION COMPETENT: NORMAL
BH CV LOWER VASCULAR RIGHT SAPHENOFEMORAL JUNCTION COMPRESS: NORMAL
BH CV LOWER VASCULAR RIGHT SAPHENOFEMORAL JUNCTION PHASIC: NORMAL
BH CV LOWER VASCULAR RIGHT SAPHENOFEMORAL JUNCTION SPONT: NORMAL
BILIRUB SERPL-MCNC: 0.6 MG/DL (ref 0.1–1.2)
BUN BLD-MCNC: 20 MG/DL (ref 8–23)
BUN/CREAT SERPL: 14.7 (ref 7–25)
CALCIUM SPEC-SCNC: 9 MG/DL (ref 8.6–10.5)
CHLORIDE SERPL-SCNC: 100 MMOL/L (ref 98–107)
CO2 SERPL-SCNC: 22.9 MMOL/L (ref 22–29)
CREAT BLD-MCNC: 1.36 MG/DL (ref 0.76–1.27)
GFR SERPL CREATININE-BSD FRML MDRD: 51 ML/MIN/1.73
GLOBULIN UR ELPH-MCNC: 2.7 GM/DL
GLUCOSE BLD-MCNC: 203 MG/DL (ref 65–99)
POTASSIUM BLD-SCNC: 3.3 MMOL/L (ref 3.5–5.2)
PROT SERPL-MCNC: 6.5 G/DL (ref 6–8.5)
SODIUM BLD-SCNC: 137 MMOL/L (ref 136–145)

## 2018-06-29 PROCEDURE — 97535 SELF CARE MNGMENT TRAINING: CPT

## 2018-06-29 PROCEDURE — 74230 X-RAY XM SWLNG FUNCJ C+: CPT

## 2018-06-29 PROCEDURE — 97110 THERAPEUTIC EXERCISES: CPT

## 2018-06-29 PROCEDURE — 92611 MOTION FLUOROSCOPY/SWALLOW: CPT

## 2018-06-29 PROCEDURE — 80053 COMPREHEN METABOLIC PANEL: CPT | Performed by: PHYSICAL MEDICINE & REHABILITATION

## 2018-06-29 RX ORDER — PAROXETINE 10 MG/1
10 TABLET, FILM COATED ORAL DAILY
Status: DISCONTINUED | OUTPATIENT
Start: 2018-06-29 | End: 2018-07-04 | Stop reason: HOSPADM

## 2018-06-29 RX ADMIN — LISINOPRIL 20 MG: 20 TABLET ORAL at 08:48

## 2018-06-29 RX ADMIN — LEVOTHYROXINE SODIUM 112 MCG: 112 TABLET ORAL at 05:42

## 2018-06-29 RX ADMIN — ACETAMINOPHEN 650 MG: 325 TABLET, FILM COATED ORAL at 19:48

## 2018-06-29 RX ADMIN — HYDRALAZINE HYDROCHLORIDE 25 MG: 25 TABLET, FILM COATED ORAL at 14:35

## 2018-06-29 RX ADMIN — METOPROLOL TARTRATE 25 MG: 25 TABLET ORAL at 19:53

## 2018-06-29 RX ADMIN — BARIUM SULFATE 8 ML: 980 POWDER, FOR SUSPENSION ORAL at 11:55

## 2018-06-29 RX ADMIN — PANTOPRAZOLE SODIUM 40 MG: 40 TABLET, DELAYED RELEASE ORAL at 05:43

## 2018-06-29 RX ADMIN — Medication 1 TABLET: at 08:48

## 2018-06-29 RX ADMIN — PAROXETINE HYDROCHLORIDE 10 MG: 10 TABLET, FILM COATED ORAL at 08:47

## 2018-06-29 RX ADMIN — HYDRALAZINE HYDROCHLORIDE 25 MG: 25 TABLET, FILM COATED ORAL at 05:43

## 2018-06-29 RX ADMIN — ACETAMINOPHEN 650 MG: 325 TABLET, FILM COATED ORAL at 07:20

## 2018-06-29 RX ADMIN — LISINOPRIL 20 MG: 20 TABLET ORAL at 19:53

## 2018-06-29 RX ADMIN — AMLODIPINE BESYLATE 10 MG: 10 TABLET ORAL at 08:48

## 2018-06-29 RX ADMIN — BARIUM SULFATE 65 ML: 960 POWDER, FOR SUSPENSION ORAL at 11:55

## 2018-06-29 RX ADMIN — ATORVASTATIN CALCIUM 80 MG: 80 TABLET, FILM COATED ORAL at 19:52

## 2018-06-29 RX ADMIN — CETIRIZINE HYDROCHLORIDE 10 MG: 10 TABLET, FILM COATED ORAL at 08:47

## 2018-06-29 RX ADMIN — METOPROLOL TARTRATE 25 MG: 25 TABLET ORAL at 08:48

## 2018-06-29 RX ADMIN — BARIUM SULFATE 50 ML: 400 SUSPENSION ORAL at 11:55

## 2018-06-29 RX ADMIN — HYDRALAZINE HYDROCHLORIDE 25 MG: 25 TABLET, FILM COATED ORAL at 21:16

## 2018-06-30 PROCEDURE — 97110 THERAPEUTIC EXERCISES: CPT

## 2018-06-30 RX ORDER — POTASSIUM CHLORIDE 750 MG/1
10 CAPSULE, EXTENDED RELEASE ORAL ONCE
Status: COMPLETED | OUTPATIENT
Start: 2018-06-30 | End: 2018-06-30

## 2018-06-30 RX ORDER — ATORVASTATIN CALCIUM 20 MG/1
40 TABLET, FILM COATED ORAL NIGHTLY
Status: DISCONTINUED | OUTPATIENT
Start: 2018-06-30 | End: 2018-07-04 | Stop reason: HOSPADM

## 2018-06-30 RX ORDER — DABIGATRAN ETEXILATE 150 MG/1
150 CAPSULE ORAL EVERY 12 HOURS SCHEDULED
Status: DISCONTINUED | OUTPATIENT
Start: 2018-06-30 | End: 2018-07-04 | Stop reason: HOSPADM

## 2018-06-30 RX ADMIN — METOPROLOL TARTRATE 25 MG: 25 TABLET ORAL at 19:33

## 2018-06-30 RX ADMIN — Medication 1 TABLET: at 08:37

## 2018-06-30 RX ADMIN — ACETAMINOPHEN 650 MG: 325 TABLET, FILM COATED ORAL at 14:41

## 2018-06-30 RX ADMIN — HYDRALAZINE HYDROCHLORIDE 25 MG: 25 TABLET, FILM COATED ORAL at 05:47

## 2018-06-30 RX ADMIN — POTASSIUM CHLORIDE 10 MEQ: 750 CAPSULE, EXTENDED RELEASE ORAL at 16:58

## 2018-06-30 RX ADMIN — CETIRIZINE HYDROCHLORIDE 10 MG: 10 TABLET, FILM COATED ORAL at 08:37

## 2018-06-30 RX ADMIN — DABIGATRAN ETEXILATE MESYLATE 150 MG: 150 CAPSULE ORAL at 19:34

## 2018-06-30 RX ADMIN — AMLODIPINE BESYLATE 10 MG: 10 TABLET ORAL at 08:37

## 2018-06-30 RX ADMIN — POTASSIUM CHLORIDE 10 MEQ: 750 CAPSULE, EXTENDED RELEASE ORAL at 13:50

## 2018-06-30 RX ADMIN — HYDRALAZINE HYDROCHLORIDE 25 MG: 25 TABLET, FILM COATED ORAL at 22:10

## 2018-06-30 RX ADMIN — PANTOPRAZOLE SODIUM 40 MG: 40 TABLET, DELAYED RELEASE ORAL at 05:48

## 2018-06-30 RX ADMIN — METOPROLOL TARTRATE 25 MG: 25 TABLET ORAL at 08:37

## 2018-06-30 RX ADMIN — HYDRALAZINE HYDROCHLORIDE 25 MG: 25 TABLET, FILM COATED ORAL at 13:50

## 2018-06-30 RX ADMIN — LISINOPRIL 20 MG: 20 TABLET ORAL at 08:37

## 2018-06-30 RX ADMIN — LEVOTHYROXINE SODIUM 112 MCG: 112 TABLET ORAL at 05:47

## 2018-06-30 RX ADMIN — PAROXETINE HYDROCHLORIDE 10 MG: 10 TABLET, FILM COATED ORAL at 08:37

## 2018-06-30 RX ADMIN — ATORVASTATIN CALCIUM 40 MG: 20 TABLET, FILM COATED ORAL at 19:33

## 2018-06-30 RX ADMIN — ACETAMINOPHEN 650 MG: 325 TABLET, FILM COATED ORAL at 22:11

## 2018-06-30 RX ADMIN — LISINOPRIL 20 MG: 20 TABLET ORAL at 19:33

## 2018-07-01 LAB

## 2018-07-01 PROCEDURE — 87070 CULTURE OTHR SPECIMN AEROBIC: CPT | Performed by: PHYSICAL MEDICINE & REHABILITATION

## 2018-07-01 PROCEDURE — 87581 M.PNEUMON DNA AMP PROBE: CPT | Performed by: PHYSICAL MEDICINE & REHABILITATION

## 2018-07-01 PROCEDURE — 87486 CHLMYD PNEUM DNA AMP PROBE: CPT | Performed by: PHYSICAL MEDICINE & REHABILITATION

## 2018-07-01 PROCEDURE — 87633 RESP VIRUS 12-25 TARGETS: CPT | Performed by: PHYSICAL MEDICINE & REHABILITATION

## 2018-07-01 PROCEDURE — 87798 DETECT AGENT NOS DNA AMP: CPT | Performed by: PHYSICAL MEDICINE & REHABILITATION

## 2018-07-01 PROCEDURE — 87205 SMEAR GRAM STAIN: CPT | Performed by: PHYSICAL MEDICINE & REHABILITATION

## 2018-07-01 RX ORDER — HYDRALAZINE HYDROCHLORIDE 50 MG/1
50 TABLET, FILM COATED ORAL EVERY 8 HOURS SCHEDULED
Status: DISCONTINUED | OUTPATIENT
Start: 2018-07-01 | End: 2018-07-04 | Stop reason: HOSPADM

## 2018-07-01 RX ORDER — HYDRALAZINE HYDROCHLORIDE 10 MG/1
10 TABLET, FILM COATED ORAL ONCE
Status: COMPLETED | OUTPATIENT
Start: 2018-07-01 | End: 2018-07-01

## 2018-07-01 RX ADMIN — METOPROLOL TARTRATE 25 MG: 25 TABLET ORAL at 08:18

## 2018-07-01 RX ADMIN — Medication 3 MG: at 22:43

## 2018-07-01 RX ADMIN — ACETAMINOPHEN 650 MG: 325 TABLET, FILM COATED ORAL at 16:16

## 2018-07-01 RX ADMIN — ACETAMINOPHEN 650 MG: 325 TABLET, FILM COATED ORAL at 22:47

## 2018-07-01 RX ADMIN — Medication 1 TABLET: at 08:18

## 2018-07-01 RX ADMIN — LISINOPRIL 20 MG: 20 TABLET ORAL at 20:27

## 2018-07-01 RX ADMIN — METOPROLOL TARTRATE 25 MG: 25 TABLET ORAL at 20:28

## 2018-07-01 RX ADMIN — HYDRALAZINE HYDROCHLORIDE 50 MG: 50 TABLET, FILM COATED ORAL at 20:28

## 2018-07-01 RX ADMIN — ATORVASTATIN CALCIUM 40 MG: 20 TABLET, FILM COATED ORAL at 20:28

## 2018-07-01 RX ADMIN — PANTOPRAZOLE SODIUM 40 MG: 40 TABLET, DELAYED RELEASE ORAL at 06:14

## 2018-07-01 RX ADMIN — LISINOPRIL 20 MG: 20 TABLET ORAL at 08:19

## 2018-07-01 RX ADMIN — PAROXETINE HYDROCHLORIDE 10 MG: 10 TABLET, FILM COATED ORAL at 08:19

## 2018-07-01 RX ADMIN — AMLODIPINE BESYLATE 10 MG: 10 TABLET ORAL at 08:18

## 2018-07-01 RX ADMIN — LEVOTHYROXINE SODIUM 112 MCG: 112 TABLET ORAL at 06:14

## 2018-07-01 RX ADMIN — HYDRALAZINE HYDROCHLORIDE 25 MG: 25 TABLET, FILM COATED ORAL at 06:14

## 2018-07-01 RX ADMIN — DABIGATRAN ETEXILATE MESYLATE 150 MG: 150 CAPSULE ORAL at 20:28

## 2018-07-01 RX ADMIN — DABIGATRAN ETEXILATE MESYLATE 150 MG: 150 CAPSULE ORAL at 08:19

## 2018-07-01 RX ADMIN — HYDRALAZINE HYDROCHLORIDE 10 MG: 10 TABLET, FILM COATED ORAL at 12:58

## 2018-07-01 NOTE — PROGRESS NOTES
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   Dysarthria  Dysphagia  Dysphonia  PAF with previous chronic anticoagulation  CKD3  HTN       Subjective     History of Present Illness    Subjective   Temp 100.3 last PM, afebrile now.  He feels comfortable now.    Cough greenish sputum up today once, no previous pulm complaints. Reviewed will check viral panel, sputum specimen.  [phone] this AM. Review additional hydralazine 10 mg now at 10:30 and then increase from 25 mg q 8 hours to 50 mg q 8 hours.     History taken from: patient    Objective     Vital Signs  Temp:  [98.4 °F (36.9 °C)-100.3 °F (37.9 °C)] 98.4 °F (36.9 °C)  Heart Rate:  [] 97  Resp:  [16-18] 16  BP: (125-148)/(64-85) 148/85    Objective:  Vital signs: (most recent): Blood pressure 123/71, pulse 84, temperature 98.7 °F (37.1 °C), temperature source Oral, resp. rate 18, height 170.2 cm (67\"), weight 80.6 kg (177 lb 12.8 oz), SpO2 96 %.          PHYSICAL EXAM  Mental status-awake alert  Lungs-clear to auscultation  Heart-S1-S2. RRR  Abdomen-normoactive bowel sounds soft and nontender  Extremities-no signficant edema BLE  Neurologic-dysarthria.  Dysmetria with the left upper extremity improved.  Left hemiparesis - improved. Takes resistance on left side well with left EF/FF/hand intrinsics/KE/ADF.   Results Review:     I reviewed the patient's new clinical results.       Results from last 7 days  Lab Units 06/29/18  1919   SODIUM mmol/L 137   POTASSIUM mmol/L 3.3*   CHLORIDE mmol/L 100   CO2 mmol/L 22.9   BUN mg/dL 20   CREATININE mg/dL 1.36*   CALCIUM mg/dL 9.0   BILIRUBIN mg/dL 0.6   ALK PHOS U/L 130*   ALT (SGPT) U/L 127*   AST (SGOT) U/L 46*   GLUCOSE mg/dL 203*       Results from last 7 days  Lab Units 06/28/18  1626   WBC 10*3/mm3 7.76   HEMOGLOBIN g/dL 13.8   HEMATOCRIT % 40.3*   PLATELETS 10*3/mm3 137*         SS June 18 " - FINDINGS: Patient demonstrated a mild oropharyngeal dysphagia  characterized by penetration of thin and mixed consistencies. Mild to  moderate residue with pooling of oral residue to valleculae and pyriform  sinuses. Reduced timing and tongue base retraction. Epiglottic  deflection improved with weight of bolus.    VFSS June 29 - no change.     -------------------------------------------------------    NONCONTRAST HEAD CT 06/21/2018     CLINICAL HISTORY: Follow-up for right anterior pontomedullary  hemorrhage.     TECHNIQUE: Spiral CT images were obtained from the base of the skull to  the vertex without intravenous contrast. Images were reformatted and  submitted in 3 mm thick axial CT sections. Additional 2 mm thick  sagittal and coronal reconstructions were performed with brain  algorithm.     This is correlated to outside noncontrast head CT Kemi 10, 2018 as well  as outside MRI of the head and MRV of the dural venous sinuses on  06/09/2018. There are no prior studies from [facility]  for comparison.     FINDINGS: There is some patchy low-density in periventricular extending  into the subcortical white matter of the cerebral hemispheres consistent  with mild-to-moderate small vessel disease. There is a 17 x 10 mm focal  area of encephalomalacia in the medial left frontoparietal region  consistent with an old infarct in the left anterior cerebral artery  territory. The density of the previously identified acute  intraparenchymal hemorrhage extending from the right anterior velma into  the right anterior medulla has decreased and there is now a chronic  hematoma cavity measuring about 9 x 7 mm in anterior posterior and  medial lateral dimension. There is no surrounding edema and no mass  effect. The etiology of the hemorrhage remains uncertain. The ventricles  are normal in size. There is no midline shift. No extra-axial fluid  collections are identified and no acute intracranial hemorrhage is  seen.  The paranasal sinuses and mastoid air cells and middle ear cavities are  clear. Calcified atherosclerotic plaques are present in the cavernous  segments of the internal carotid arteries bilaterally.     IMPRESSION:     1. Since outside MRI of the head and MRV of the dural sinuses 06/09/2018  and outside noncontrast head CT 06/10/2018, there have been expected  evolutionary changes in the hemorrhage in the right anterior  pontomedullary junction. The density of the hemorrhage has significantly  decreased and now there is a 9 x 7 x 9 mm chronic hematoma cavity  extending from the right anterior inferior velma into the right anterior  medulla. There has been resolution of surrounding edema. There is no  mass effect. The etiology of the right anterior pontomedullary  hemorrhage remains uncertain. It could be from an underlying cavernous  malformation or hemorrhagic transformation of an arterial or venous  infarct. At some point a follow-up MRI of the head with and without  contrast and MRA/MRV could be obtained to reevaluate and compared to the  outside MRI/MRV 06/09/2018.     2. Mild-to-moderate small vessel disease in the cerebral white matter  and there is a 17 x 10 mm old superior medial left frontoparietal  infarct in the left anterior cerebral artery territory. The remainder of  the head CT is unremarkable.  -------------------------------------------------------------------------------    Medication Review: done  Scheduled Meds:    amLODIPine 10 mg Oral Q24H   atorvastatin 40 mg Oral Nightly   cetirizine 10 mg Oral Daily   dabigatran etexilate 150 mg Oral Q12H   hydrALAZINE 10 mg Oral Once   hydrALAZINE 50 mg Oral Q8H   levothyroxine 112 mcg Oral Q AM   lisinopril 20 mg Oral Q12H   metoprolol tartrate 25 mg Oral Q12H   multivitamin 1 tablet Oral Daily   pantoprazole 40 mg Oral QAM   PARoxetine 10 mg Oral Daily   [START ON 7/7/2018] PARoxetine 5 mg Oral Daily     Continuous Infusions:   PRN Meds:.•   acetaminophen  •  hydrALAZINE  •  melatonin  •  MEXSANA  •  nitroglycerin  •  ondansetron      Assessment/Plan     Active Problems:    CVA (cerebral vascular accident)      Assessment & Plan  S/p  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  Follow-up CT of the head on June 21 here with follow-up neurosurgery next day to determine possible resuming anticoagulation  June 25 - Per Dr Mendes, to hold Eliquis for at least two weeks pending [facility] which he had on Friday. Seen by Dr Holland Neurosurgery on June 22 - cleared to resume anticoagulation. Discussed risks and benefits of resuming anticoagulation with patient today. He was cleared by Neurosurgery to resume anticoagulation.  Patient wishes to resume Eliquis.Will plan to start with dose this evening.   June 26 - patient declined Eliquis last PM as  his cardiologist wished to assess options.   Reviewed with Dr Carrasco - Cardiology - today. He has call out to Neurosurgery regarding resuming Eliquis vs change to Pradaxa as has a reversal agent available.     June 28- awaiting decision from Dr. Carrasco  June 30 - Discussed with Dr Carrasco - will start Pradaxa. Patient in agreemant.        Psych- on Paxil chronically - ? Discontinue if resumes oral anticoagulant as can increase risk of bleeding. June 22- CT stable. Anticipate will be resuming Eliquis when sees Neurosurgery. Reviewed with patient. Dr Talley not available today. Will taper off Paxil from 15 mg daily to 10 mg daily for 2 weeks, then 5 mg daily for two weeks, then stop. Will plan to discuss with PCP other options for anxiety. June 26- discussed with Dr Talley yesterday - plan for starting Buspar once tapers further on Paxil.     Elevated transaminases - June 30 - decreased atorvastatin to 1/2 dose to 40 mg daily.   Dysarthria    Dysphagia- advanced to [facility] soft,NTL. Add E-stim for dysphagia.  - VFSS 6-29 - no change    Dysphonia    PAF with previous chronic  anticoagulation    CKD3-  June 30 - Cr 1.39    HTN- amlodipine/lisinopril/metoprolol. June 20 - BP elevated last PM - added hydralazine 10 mg q 6 hours prn SBP> 160 or DBP> 100.  June 21-(has not received any when necessary hydralazine) added scheduled hydralazine 10 mg by mouth every 8 hours.  Has bradycardia pulse in the 50s so did not increase metoprolol.  On amlodipine 10 mg a day and lisinopril 20 mg twice a day. June 22- BP still elevated with . Increase Hydralazine to 25 mg q 8 hours. June 26 - edema 1+ BLE on amlodipine 10 mg daily. Had edema in past. 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 If BP better control, will decrease amlodipine to 5 mg. Consider diuretic. Will review with Cardiology. June 29 - BP increased when has temp elevation but better than afternoon. Edema better BLE so will note change from Norvasc 10 mg presently. June 30 -Follow BP, may increase hydralazine.  Edema resolved BLE. July 1 - [phone] this AM. Review additional hydralazine 10 mg now at 10:30 and then increase from 25 mg q 8 hours to 50 mg q 8 hours.     Gout -   controlled with Tylenol - better.     Fever   June 28 -   Fever 101.5 . Nursing reports chills/ feels cold. No pulmonary or dysuria. No significant rash, No recent skin lesion, No recent infections.   Lungs CTA. Heart Mild tachy, regular, AB - NABS,soft, NT. Ext - left first MTP non-tender. Left calf larger than right.   Will check CXR, UA, Blood cultures with fever and chills, CBC.  Doubt gout fever with chills as no pain left first MTP now.  Doubt DVT with c/o chills but LLE larger than right and will check venous duplex.     BLE Venous duplex negative for DVT.  CXR   clear.   UA negative.    Results from last 7 days  Lab Units 06/28/18  1626 06/23/18  0412   WBC 10*3/mm3 7.76 7.85   HEMOGLOBIN g/dL 13.8 13.0*   HEMATOCRIT % 40.3* 39.9*   PLATELETS 10*3/mm3 137* 188   Results for MAURA NORMAN (MRN [id number]) as of 6/28/2018 17:43    Ref.  Range 6/28/2018 16:26   Procalcitonin Latest Ref Range: 0.10 - 0.25 ng/mL 0.24      Will hold on antibiotics with low procalcitonin.  Could possibly have some pneumonitis as cause with his dysphagia.     June 29 - temp 100.4 this AM but afebrile this afternoon. Will recheck CBC. Will also recheck CMP to assess renal insuff and LFTs on high dose statin. Blood culture negative at 24 hours. LLE still larger than RLE but noticeable less - may be neurogenic edema.      July 1 - Temp 100.3 last PM, afebrile now. Cough greenish sputum up today, no previous pulm complaints. Reviewed will check viral panel, sputum specimen.        Continue rehabilitation program.  Physical therapy 1 hour, Occupational therapy 1 hour, speech 1 hour, 5 days a week.  Rehabilitation nursing for carryover monitoring of his cardiac status, neurologic status, bowel bladder and skin.  Ongoing physician follow-up.  Weekly team conferences.  Goals and estimate length of stay and rehabilitation prognosis/medical prognosis indeterminate at this time    TEAM JOVANY- June 20 - BED SUP,TRANSFERS CTG. GAIT 160 FEET MIN ASSIT. UBD SET UP. LBD MIN ASSIST.  MILD TO MODERATE DYSARTHRIA. LEFT FACIAL DROP.  DYSPHAGIA- MECH SOFT, NTL. ADD E-STIM  COGNITIIVE SCREEN APPEARS WFL. FURTHER TESTING FOR HIGHER LEVEL. CONTINENT BOWEL AND BLADDER  ELOS- 14 days  Addendum: Patient had fall in bathroom landed on back against the wall. He denies hitting head. Denies any significant back pain, no new numbness or weakness. Tolerates sitting. Order neuro checks q 4 hours x 2, then q shift.    TEAM CNF - June 27 - ADLS SBA-SET UP. . TRANSFERS CTG. GAIT 240 FEET CTG. 21 STAIRS CTG. DYSPHAGIA - TOLERATES TRIALS OF THIN LIQUIDS. E-STIM FOR DYSPHAGIA. USING STRATEGIES FOR ARTICULATION. CUES FOR SAFETY STRATEGIES WITH NURSING. CONTINENT.  BNE (Active)  Att'n. - WNL  Exec Fx. - WNL  Rsng/Jgmnt - WNL  Arith - Min Imp.  Visuospatial Skills - WNL  Visual Mem. - WNL  Verbal Mem - WNL  Emot -  Pt denied dep/anx  SAVANNAH Mckeon MD  07/01/18  10:27 AM    Time:   >35 minutes with > 50% face to face/ floor time/coordination of care.

## 2018-07-01 NOTE — PROGRESS NOTES
Inpatient Rehabilitation Plan of Care Note    Plan of Care  Care Plan Reviewed - No updates at this time.    Field    Signed by: Mei Yonug RN

## 2018-07-01 NOTE — PROGRESS NOTES
Inpatient Rehabilitation Functional Measures Assessment    Functional Measures  DAVEY Eating:  [facility] Grooming: [facility] Bathing:  [facility] Upper Body Dressing:  [facility] Lower Body Dressing:  [facility] Toileting:  [facility] Bladder Management  Level of Assistance:  [place]  Frequency/Number of Accidents this Shift:  [facility] Bowel Management  Level of Assistance: [place]  Frequency/Number of Accidents this Shift: [facility] Bed/Chair/Wheelchair Transfer:  [facility] Toilet Transfer:  [facility] Tub/Shower Transfer:  [place]    Previously Documented Mode of Locomotion at Discharge: Field  DAVEY Expected Mode of Locomotion at Discharge: [facility] Walk/Wheelchair:  [facility] Stairs:  [facility] Comprehension:  Auditory comprehension is the usual mode. Comprehension  Score = 6, Modified [place].  Patient comprehends complex/abstract  information in their primary language, requiring:  [facility] Expression:  [facility] Social Interaction:  [facility] Problem Solving:  [facility] Memory:  [place]    Therapy Mode Minutes  Occupational Therapy: Branch  Physical Therapy: [place]  Speech Language Pathology:  [place]    Signed by: Mei Young RN

## 2018-07-01 NOTE — PLAN OF CARE
Problem: Fall Risk (Adult)  Goal: Absence of Fall   07/01/18 0158   Fall Risk (Adult)   Absence of Fall making progress toward outcome

## 2018-07-01 NOTE — PLAN OF CARE
Problem: Patient Care Overview  Goal: Plan of Care Review  Outcome: Ongoing (interventions implemented as appropriate)   07/01/18 0308   Patient Care Overview   IRF Plan of Care Review progress ongoing, continue   Progress, Functional Goals demonstrating adequate progress   OTHER   Outcome Summary Patient continues to run a low grade fever- MD aware. All lab work completed Thurs has been stable. He was supposted to go on home pass Sun, but has declined r/t fever and lethergy. He is A&O x4, continent of B/B and takes meds in applesauce also he eats in DR for meals, No unsafe behavior and has used the call light appropriately.   Coping/Psychosocial   Plan of Care Reviewed With patient       Problem: Stroke (Hemorrhagic) (Adult)  Goal: Signs and Symptoms of Listed Potential Problems Will be Absent, Minimized or Managed (Stroke)  Outcome: Ongoing (interventions implemented as appropriate)   07/01/18 0308   Goal/Outcome Evaluation   Problems Assessed (Stroke (Hemorrhagic)) all   Problems Present (Stroke (Hemorrhagic)) none       Problem: Fall Risk (Adult)  Goal: Absence of Fall  Outcome: Ongoing (interventions implemented as appropriate)   07/01/18 0308   Fall Risk (Adult)   Absence of Fall making progress toward outcome       Problem: Dysphagia (Adult)  Goal: Functional/Safe Swallow  Outcome: Ongoing (interventions implemented as appropriate)   07/01/18 0308   Dysphagia (Adult)   Functional/Safe Swallow making progress toward outcome     Goal: Compensatory Techniques to Improve Safety/Function with Swallowing  Outcome: Ongoing (interventions implemented as appropriate)   07/01/18 0308   Dysphagia (Adult)   Compensatory Techniques to Improve Safety/Function with Swallowing making progress toward outcome       Problem: Skin Injury Risk (Adult)  Goal: Skin Health and Integrity  Outcome: Ongoing (interventions implemented as appropriate)   07/01/18 0308   Skin Injury Risk (Adult)   Skin Health and Integrity making progress  toward outcome

## 2018-07-01 NOTE — PROGRESS NOTES
Inpatient Rehabilitation Plan of Care Note    Plan of Care  Care Plan Reviewed - No updates at this time.    Sphincter Control    Performed Intervention(s)  Monitor I&O  Encourage fluid intake      Safety    Performed Intervention(s)  Safety rounds, items within reach  Bed and chair alarms  Safety protocol      Psychosocial    Performed Intervention(s)  Allow adequate time to express needs/ concerns  Medication as ordered      Nutrition    Performed Intervention(s)  Diet as ordered  Follow parameters (meals in DR, alternate bites/sips, swallow 3x, 90degrees for  all intake)    Signed by: Fauzia Schwab RN

## 2018-07-01 NOTE — PROGRESS NOTES
Inpatient Rehabilitation Functional Measures Assessment    Functional Measures  DAVEY Eating:  [facility] Grooming: [facility] Bathing:  [facility] Upper Body Dressing:  [facility] Lower Body Dressing:  [facility] Toileting:  [facility] Bladder Management  Level of Assistance:  [place]  Frequency/Number of Accidents this Shift:  [facility] Bowel Management  Level of Assistance: [place]  Frequency/Number of Accidents this Shift: [facility] Bed/Chair/Wheelchair Transfer:  [facility] Toilet Transfer:  [facility] Tub/Shower Transfer:  [place]    Previously Documented Mode of Locomotion at Discharge: Field  DAVEY Expected Mode of Locomotion at Discharge: [facility] Walk/Wheelchair:  [facility] Stairs:  [facility] Comprehension:  Auditory comprehension is the usual mode. Comprehension  Score = 6, Modified [place].  Patient comprehends complex/abstract  information in their primary language, requiring:  DAVEY Expression:  Vocal expression is the usual mode. Expression Score = 6,  Modified Independent.  Patient expresses complex/abstract information in their  primary language, requiring:  DA[facility] Social Interaction:  Social Interaction Score = 6, Modified [place].  Patient is modified independent for social interaction, requiring:  DAVE[facility] Problem Solving:  Problem Solving Score = 6, Modified [place].  Patient  makes appropriate decisions in order to solve complex problems, but requires  extra time.  DAVEY Memory:  Memory Score = 6, Modified [place].  Patient is modified  independent for memory, requiring:    Therapy Mode Minutes  Occupational Therapy: Branch  Physical Therapy: [facility]  Speech Language Pathology:  [place]    Signed by: Fauzia Schwab RN

## 2018-07-01 NOTE — PLAN OF CARE
Problem: Skin Injury Risk (Adult)  Goal: Skin Health and Integrity   07/01/18 1701   Skin Injury Risk (Adult)   Skin Health and Integrity making progress toward outcome

## 2018-07-01 NOTE — PROGRESS NOTES
Inpatient Rehabilitation Functional Measures Assessment    Functional Measures  DAVEY Eating:  [facility] Grooming: [facility] Bathing:  [facility] Upper Body Dressing:  [facility] Lower Body Dressing:  [facility] Toileting:  [facility] Bladder Management  Level of Assistance:  [place]  Frequency/Number of Accidents this Shift:  [facility] Bowel Management  Level of Assistance: [place]  Frequency/Number of Accidents this Shift: [facility] Bed/Chair/Wheelchair Transfer:  [facility] Toilet Transfer:  [facility] Tub/Shower Transfer:  [place]    Previously Documented Mode of Locomotion at Discharge: Field  DAVEY Expected Mode of Locomotion at Discharge: [facility] Walk/Wheelchair:  [facility] Stairs:  [facility] Comprehension:  Auditory comprehension is the usual mode. Comprehension  Score = 6, Modified [place].  Patient comprehends complex/abstract  information in their primary language, requiring:  DAVEY Expression:  Vocal expression is the usual mode. Expression Score = 6,  Modified Independent.  Patient expresses complex/abstract information in their  primary language, requiring:  DA[facility] Social Interaction:  Social Interaction Score = 7, Independent. Patient is  completely independent for social interaction.  There are no activity  limitations.  DAVEY Problem Solving:  Problem Solving Score = 6, Modified [place].  Patient  makes appropriate decisions in order to solve complex problems, but requires  extra time.  DAVEY Memory:  Memory Score = 6, Modified [place].  Patient is modified  independent for memory, requiring:    Therapy Mode Minutes  Occupational Therapy: [place]  Physical Therapy: [place]  Speech Language Pathology:  [place]    Signed by: Mei Young RN

## 2018-07-02 LAB
BASOPHILS # BLD AUTO: 0.02 10*3/MM3 (ref 0–0.2)
BASOPHILS NFR BLD AUTO: 0.2 % (ref 0–1.5)
DEPRECATED RDW RBC AUTO: 42.6 FL (ref 37–54)
EOSINOPHIL # BLD AUTO: 0.32 10*3/MM3 (ref 0–0.7)
EOSINOPHIL NFR BLD AUTO: 3.2 % (ref 0.3–6.2)
ERYTHROCYTE [DISTWIDTH] IN BLOOD BY AUTOMATED COUNT: 12.5 % (ref 11.5–14.5)
HCT VFR BLD AUTO: 41.1 % (ref 40.4–52.2)
HGB BLD-MCNC: 13.9 G/DL (ref 13.7–17.6)
IMM GRANULOCYTES # BLD: 0.02 10*3/MM3 (ref 0–0.03)
IMM GRANULOCYTES NFR BLD: 0.2 % (ref 0–0.5)
LYMPHOCYTES # BLD AUTO: 0.41 10*3/MM3 (ref 0.9–4.8)
LYMPHOCYTES NFR BLD AUTO: 4.1 % (ref 19.6–45.3)
MCH RBC QN AUTO: 31.5 PG (ref 27–32.7)
MCHC RBC AUTO-ENTMCNC: 33.8 G/DL (ref 32.6–36.4)
MCV RBC AUTO: 93.2 FL (ref 79.8–96.2)
MONOCYTES # BLD AUTO: 0.64 10*3/MM3 (ref 0.2–1.2)
MONOCYTES NFR BLD AUTO: 6.4 % (ref 5–12)
NEUTROPHILS # BLD AUTO: 8.64 10*3/MM3 (ref 1.9–8.1)
NEUTROPHILS NFR BLD AUTO: 86.1 % (ref 42.7–76)
PLATELET # BLD AUTO: 182 10*3/MM3 (ref 140–500)
PMV BLD AUTO: 11.1 FL (ref 6–12)
PROCALCITONIN SERPL-MCNC: 0.3 NG/ML (ref 0.1–0.25)
RBC # BLD AUTO: 4.41 10*6/MM3 (ref 4.6–6)
WBC NRBC COR # BLD: 10.03 10*3/MM3 (ref 4.5–10.7)

## 2018-07-02 PROCEDURE — 84145 PROCALCITONIN (PCT): CPT | Performed by: PHYSICAL MEDICINE & REHABILITATION

## 2018-07-02 PROCEDURE — 97535 SELF CARE MNGMENT TRAINING: CPT

## 2018-07-02 PROCEDURE — 92526 ORAL FUNCTION THERAPY: CPT

## 2018-07-02 PROCEDURE — 85025 COMPLETE CBC W/AUTO DIFF WBC: CPT | Performed by: PHYSICAL MEDICINE & REHABILITATION

## 2018-07-02 PROCEDURE — 97110 THERAPEUTIC EXERCISES: CPT

## 2018-07-02 RX ADMIN — ACETAMINOPHEN 650 MG: 325 TABLET, FILM COATED ORAL at 07:17

## 2018-07-02 RX ADMIN — LISINOPRIL 20 MG: 20 TABLET ORAL at 07:55

## 2018-07-02 RX ADMIN — AMLODIPINE BESYLATE 10 MG: 10 TABLET ORAL at 07:54

## 2018-07-02 RX ADMIN — METOPROLOL TARTRATE 25 MG: 25 TABLET ORAL at 21:17

## 2018-07-02 RX ADMIN — HYDRALAZINE HYDROCHLORIDE 50 MG: 50 TABLET, FILM COATED ORAL at 06:16

## 2018-07-02 RX ADMIN — Medication 3 MG: at 21:17

## 2018-07-02 RX ADMIN — METOPROLOL TARTRATE 25 MG: 25 TABLET ORAL at 07:55

## 2018-07-02 RX ADMIN — DABIGATRAN ETEXILATE MESYLATE 150 MG: 150 CAPSULE ORAL at 07:55

## 2018-07-02 RX ADMIN — LEVOTHYROXINE SODIUM 112 MCG: 112 TABLET ORAL at 06:16

## 2018-07-02 RX ADMIN — PAROXETINE HYDROCHLORIDE 10 MG: 10 TABLET, FILM COATED ORAL at 07:55

## 2018-07-02 RX ADMIN — DABIGATRAN ETEXILATE MESYLATE 150 MG: 150 CAPSULE ORAL at 21:17

## 2018-07-02 RX ADMIN — HYDRALAZINE HYDROCHLORIDE 50 MG: 50 TABLET, FILM COATED ORAL at 14:28

## 2018-07-02 RX ADMIN — PANTOPRAZOLE SODIUM 40 MG: 40 TABLET, DELAYED RELEASE ORAL at 06:16

## 2018-07-02 RX ADMIN — Medication 1 TABLET: at 07:55

## 2018-07-02 RX ADMIN — HYDRALAZINE HYDROCHLORIDE 50 MG: 50 TABLET, FILM COATED ORAL at 21:17

## 2018-07-02 RX ADMIN — CETIRIZINE HYDROCHLORIDE 10 MG: 10 TABLET, FILM COATED ORAL at 07:54

## 2018-07-02 RX ADMIN — ATORVASTATIN CALCIUM 40 MG: 20 TABLET, FILM COATED ORAL at 21:17

## 2018-07-02 RX ADMIN — ACETAMINOPHEN 650 MG: 325 TABLET, FILM COATED ORAL at 21:16

## 2018-07-02 RX ADMIN — LISINOPRIL 20 MG: 20 TABLET ORAL at 21:17

## 2018-07-02 NOTE — PROGRESS NOTES
Inpatient Rehabilitation Functional Measures Assessment and Plan of Care    Plan of Care  Updated Problems/Interventions  Field    Functional Measures  DAVEY Eating:  Eating Score = 5. Patient is supervision/set-up for eating,  requiring: No assistive devices were required.  DAEVY Grooming: Grooming Score = 5. Patient is supervision/set-up for grooming,  requiring: No assistive devices were required.  DAVEY Bathing:  Patient bathed in shower. Bathing Score = 5.  Patient is  supervision/set-up for bathing, requiring: Standing by. Patient requires the  following assistive device(s): Hand held shower. Grab bar/arm rest to maintain  balance.  DAVEY Upper Body Dressing:  Upper Body Dressing Score = 5. Patient is supervision  for upper body dressing, requiring: Gathering/setting out clothes. No assistive  devices were required.  DAVEY Lower Body Dressing:  Lower Body Dressing Score = 5. Patient is  supervision/set-up for lower body dressing, requiring: Verbal cuing, prompting,  or instructing. Gathering/setting out clothes. No assistive devices were  required.  DAVEY Toileting:  Toileting Score = 5.  Patient is supervision/set-up for  toileting, requiring: Patient requires the following assistive device(s):  Adaptive device to maintain balance.    DAVEY Bladder Management  Level of Assistance:  [place]  Frequency/Number of Accidents this Shift:  Branch    [facility] Bowel Management  Level of Assistance: [place]  Frequency/Number of Accidents this Shift: [facility]    [facility] Bed/Chair/Wheelchair Transfer:  [place]  [facility] Toilet Transfer:  Toilet Transfer Score = 4.  Patient performs 75% or more  of effort and minimal assistance (little/incidental help/steadying) for  transferring to and from the toilet/commode, requiring: Contact guard. No  assistive devices were required.  DAVEY Tub/Shower Transfer:  Shower Transfer Score = 5.  Patient is  supervision/set-up for transferring to and from the shower, requiring: No  assistive devices were  required.    Previously Documented Mode of Locomotion at Discharge: Field  DAVEY Expected Mode of Locomotion at Discharge: [facility] Walk/Wheelchair:  Branch  [facility] Stairs:  [facility] Comprehension:  [facility] Expression:  [facility] Social Interaction:  [facility] Problem Solving:  [facility] Memory:  [place]    Therapy Mode Minutes  Occupational Therapy: Branch  Physical Therapy: Branch  Speech Language Pathology:  [place]    Signed by: Trip Og OTR/MARCY

## 2018-07-02 NOTE — PROGRESS NOTES
Inpatient Rehabilitation Plan of Care Note    Plan of Care  Care Plan Reviewed - No updates at this time.    Sphincter Control    Performed Intervention(s)  Monitor I&O  Encourage fluid intake      Safety    Performed Intervention(s)  Safety rounds, items within reach  Bed and chair alarms  Safety protocol      Psychosocial    Performed Intervention(s)  Allow adequate time to express needs/ concerns  Medication as ordered      Nutrition    Performed Intervention(s)  Diet as ordered  Follow parameters (meals in DR, alternate bites/sips, swallow 3x, 90degrees for  all intake)    Signed by: Cassie Garcia RN

## 2018-07-02 NOTE — THERAPY TREATMENT NOTE
Inpatient Rehabilitation - Occupational Therapy Treatment Note    [facility]     Patient Name: Martín Souza  : 1940  MRN: [id number]    Today's Date: 2018                 Admit Date: 6/15/2018      Visit Dx:    ICD-10-CM ICD-9-CM   1. Dysarthria R47.1 784.51       Patient Active Problem List   Diagnosis   • CVA (cerebral vascular accident)         Therapy Treatment          IRF Treatment Summary     Row Name 18 1000 18 0910          Evaluation/Treatment Time and Intent    Subjective Information complains of;fatigue   Simultaneous filing. User may be unaware of other data.  -KB complains of;weakness   fever  -LB     Document Type therapy note (daily note)   Simultaneous filing. User may be unaware of other data.  -KB therapy note (daily note)  -LB     Mode of Treatment speech-language pathology;individual therapy   Simultaneous filing. User may be unaware of other data.  -KB physical therapy  -LB     Patient/Family Observations seated in wc in his room; agreeable to therapy   Simultaneous filing. User may be unaware of other data.  -KB up in w/c  -LB     Start Time (Evaluation/Treatment) 1000  -KB  --     Stop Time (Evaluation/Treatment) 1030  -KB  --     Recorded by [KB] Katherine L Bruton [LB] Chey Owens, PTA     Row Name 18 1000             Family/Support System    Health Advocacy (Family/Support System) caregiver advocates for patient's health  -DN      Recorded by [ELIAS] GRACIELA Garcia      Row Name 18 1000             Cognition/Psychosocial- PT/OT    Affect/Mental Status (Cognitive) WFL  -DN      Orientation Status (Cognition) oriented x 4  -DN      Follows Commands (Cognition) follows one step commands;over 90% accuracy  -DN      Personal Safety Interventions fall prevention program maintained;gait belt  -DN      Cognitive Function (Cognitive) safety deficit  -DN      Safety Deficit (Cognitive) mild deficit  -DN      Recorded by [ELIAS] GRACIELA Garcia       Row Name 07/02/18 1000 07/02/18 0910          Transfer Assessment/Treatment    Bed-Chair Will (Transfers)  -- contact guard  -DN     Assistive Device (Bed-Chair Transfers)  -- wheelchair  -DN     Sit-Stand Will (Transfers)  -- stand by assist  -LB     Stand-Sit Will (Transfers)  -- stand by assist  -LB     Will Level (Shower Transfer) contact guard  -DN  --     Assistive Device (Shower Transfer) shower chair;grab bars/tub rail;wheelchair  -DN  --     Will Level (Bathtub Transfer) contact guard  -DN  --     Recorded by [DN] Trip Og OTR [DN] Trip Og OTR  [LB] Chey Owens PTA     Row Name 07/02/18 0910             Sit-Stand Transfer    Assistive Device (Sit-Stand Transfers) walker, front-wheeled  -LB      Recorded by [LB] Chey Owens PTA      Row Name 07/02/18 0910             Stand-Sit Transfer    Assistive Device (Stand-Sit Transfers) walker, front-wheeled  -LB      Recorded by [LB] Chey Owens PTA      Row Name 07/02/18 1000             Shower Transfer    Type (Shower Transfer) stand pivot/stand step  -DN      Recorded by [DN] GRACIELA Garcia      Row Name 07/02/18 1000             Bathtub Transfer    Type (Bathtub Transfer) stand pivot/stand step  -DN      Recorded by [DN] GRACIELA Garcia      Row Name 07/02/18 0910             Car Transfer    Type (Car Transfer) sit-stand;stand-sit  -LB      Will Level (Car Transfer) contact guard;stand by assist  -LB      Assistive Device (Car Transfer) walker, front-wheeled  -LB      Recorded by [LB] Chey Owens PTA      Row Name 07/02/18 0910             Gait/Stairs Assessment/Training    Will Level (Gait) contact guard;stand by assist  -LB      Assistive Device (Gait) walker, front-wheeled  -LB      Distance in Feet (Gait) 80   as far as 180 ft  -LB      Deviations/Abnormal Patterns (Gait) base of support, narrow  -LB      Bilateral Gait Deviations forward flexed posture   -LB      Left Sided Gait Deviations heel strike decreased  -LB      Recorded by [LB] Chey Owens [facility]      Row Name 07/02/18 0910             Curb Negotiation (Mobility)    [place], Curb Negotiation contact guard  -LB      Comment, Curb Negotiation (Mobility) Rwx  -LB      Recorded by [LB] Chey Owens [facility]      Row Name 07/02/18 1000             Upper Body Dressing Assessment/Treatment    Upper Body Dressing Task upper body dressing skills;set up assistance  -DN      Upper Body Dressing Position supported sitting  -DN      Set-up Assistance (Upper Body Dressing) obtain clothing  -DN      Recorded by [DN] GRACIELA Garcia      Row Name 07/02/18 1000             Lower Body Dressing Assessment/Treatment    Lower Body Dressing [place] Level doff;don;pants/bottoms;shoes/slippers;socks;underwear;contact guard assist  -DN      Lower Body Dressing Position supported sitting;supported standing  -DN      Lower Body Dressing Setup Assistance obtain clothing  -DN      Recorded by [ELIAS] GRACIELA Garcia      Row Name 07/02/18 1000             Grooming Assessment/Treatment    Grooming [place] Level grooming skills;deodorant application;hair care, combing/brushing;oral care regimen;set up  -DN      Assistive Device (Grooming) electric razor  -DN      Grooming Position sink side  -DN      Grooming Setup Assistance obtain supplies  -DN      Recorded by [ELIAS] GRACIELA Garcia      Row Name 07/02/18 1000             Toileting Assessment/Treatment    Toileting [place] Level toileting skills;supervision  -DN      Assistive Device Use (Toileting) grab bar/safety frame;raised toilet seat  -DN      Toileting Position supported sitting;unsupported standing  -DN      Toileting Setup Assistance obtain supplies  -DN      Recorded by [DN] GRCAIELA Garcia      Row Name 07/02/18 1000 07/02/18 0910          Pain Scale: Numbers Pre/Post-Treatment    Pain Scale: Numbers, Pretreatment 0/10 - no pain  -KB 0/10 -  no pain  -LB     Recorded by [KB] Katherine L Bruton [LB] Chey SEYMOUR Arthur[place] PTA     Row Name 07/02/18 0910             Static Standing Balance    Comment (Unsupported Standing, Static Balance) retrieved object from floor cga, rwx  -LB      Recorded by [LB] Chey SEYMOUR [place], PTA      Row Name 07/02/18 0910             Dynamic Standing Balance    Comment, Resists Mild Perturbations (Sitting, Dynamic Balance) amb on uneven surface x 12 ft  w rwx, CGA  -LB      Recorded by [LB] Chey Owens PTA      Row Name 07/02/18 1000             Positioning and Restraints    Pre-Treatment Position in bed  -DN      Post Treatment Position wheelchair  -DN      Recorded by [ELIAS] GRACIELA Garcia        User Key  (r) = Recorded By, (t) = Taken By, (c) = Cosigned By    Initials Name Effective Dates    DN GRACIELA Garcia 06/08/18 -     LB Chey Owens PTA 03/07/18 -     KB Katherine L Bruton 03/07/18 -                  OT Recommendation and Plan                       OT IRF GOALS     Row Name 06/25/18 1200             Transfer Goal 1 (OT-IRF)    Activity/Assistive Device (Transfer Goal 1, OT-IRF) toilet;shower chair;walk-in shower;walker, rolling  -DN      [place] Level (Transfer Goal 1, OT-IRF) supervision required;verbal cues required  -DN      Time Frame (Transfer Goal 1, OT-IRF) short term goal (STG)  -DN      Progress/Outcomes (Transfer Goal 1, OT-IRF) good progress toward goal;goal revised this date;goal ongoing  -DN         Transfer Goal 2 (OT-IRF)    Activity/Assistive Device (Transfer Goal 2, OT-IRF) toilet;shower chair;walk-in shower;walker, rolling  -DN      [place] Level (Transfer Goal 2, OT-IRF) supervision required;verbal cues required  -DN      Time Frame (Transfer Goal 2, OT-IRF) long term goal (LTG)  -DN      Progress/Outcomes (Transfer Goal 2, OT-IRF) goal revised this date;goal ongoing  -DN         Bathing Goal 1 (OT-IRF)    Activity/Device (Bathing Goal 1, OT-IRF) bathing skills, all;grab  bar/tub rail;hand-held shower spray hose;shower chair  -DN      [place] Level (Bathing Goal 1, OT-IRF) supervision required;verbal cues required  -DN      Time Frame (Bathing Goal 1, OT-IRF) short term goal (STG)  -DN      Progress/Outcomes (Bathing Goal 1, OT-IRF) goal met;goal revised this date  -DN         Bathing Goal 2 (OT-IRF)    Activity/Device (Bathing Goal 2, OT-IRF) bathing skills, all;grab bar/tub rail;hand-held shower spray hose;shower chair  -DN      [place] Level (Bathing Goal 2, OT-IRF) supervision required  -DN      Time Frame (Bathing Goal 2, OT-IRF) long term goal (LTG);by discharge  -DN      Progress/Outcomes (Bathing Goal 2, OT-IRF) goal ongoing  -DN         UB Dressing Goal 1 (OT-IRF)    Activity/Device (UB Dressing Goal 1, OT-IRF) upper body dressing  -DN      [place] (UB Dress Goal 1, OT-IRF) supervision required  -DN      Time Frame (UB Dressing Goal 1, OT-IRF) short term goal (STG)  -DN      Progress/Outcomes (UB Dressing Goal 1, OT-IRF) goal met;goal ongoing  -DN         UB Dressing Goal 2 (OT-IRF)    Activity/Device (UB Dressing Goal 2, OT-IRF) upper body dressing  -DN      [place] (UB Dress Goal 2, OT-IRF) supervision required  -DN      Time Frame (UB Dressing Goal 2, OT-IRF) long term goal (LTG)  -DN      Progress/Outcomes (UB Dressing Goal 2, OT-IRF) goal ongoing  -DN         LB Dressing Goal 1 (OT-IRF)    Activity/Device (LB Dressing Goal 1, OT-IRF) lower body dressing  -DN      [place] (LB Dressing Goal 1, OT-IRF) contact guard assist;supervision required  -DN      Time Frame (LB Dressing Goal 1, OT-IRF) short term goal (STG)  -DN      Progress/Outcomes (LB Dressing Goal 1, OT-IRF) goal met;goal revised this date  -DN         LB Dressing Goal 2 (OT-IRF)    Activity/Device (LB Dressing Goal 2, OT-IRF) lower body dressing  -DN      [place] (LB Dressing Goal 2, OT-IRF) supervision required  -DN      Time Frame (LB Dressing Goal 2, OT-IRF) long term goal  (LTG)  -DN      Progress/Outcomes (LB Dressing Goal 2, OT-IRF) goal ongoing  -DN         Toileting Goal 1 (OT-IRF)    Activity/Device (Toileting Goal 1, OT-IRF) toileting skills, all;perform perineal hygiene;adjust/manage clothing;grab bar/safety frame  -DN      [place] Level (Toileting Goal 1, OT-IRF) contact guard assist  -DN      Time Frame (Toileting Goal 1, OT-IRF) short term goal (STG)  -DN      Progress/Outcomes (Toileting Goal 1, OT-IRF) goal met;goal revised this date  -DN         Toileting Goal 2 (OT-IRF)    Activity/Device (Toileting Goal 2, OT-IRF) toileting skills, all;adjust/manage clothing;perform perineal hygiene  -DN      [place] Level (Toileting Goal 2, OT-IRF) supervision required;verbal cues required  -DN      Time Frame (Toileting Goal 2, OT-IRF) long term goal (LTG)  -DN      Progress/Outcomes (Toileting Goal 2, OT-IRF) goal ongoing  -DN         Strength Goal 1 (OT-IRF)    Strength Goal 1 (OT-IRF) Pt to increase LUE strength to 4-/5 to assist with ADLs.  -DN      Time Frame (Strength Goal 1, OT-IRF) short term goal (STG);1 week  -DN      Progress/Outcomes (Strength Goal 1, OT-IRF) goal met;goal revised this date  -DN         Strength Goal 2 (OT-IRF)    Strength Goal 2 (OT-IRF) Pt to increase LUE strength to 4/5 to assist with ADL.  -DN      Time Frame (Strength Goal 2, OT-IRF) long term goal (LTG);by discharge  -DN      Progress/Outcomes (Strength Goal 2, OT-IRF) goal ongoing  -DN         Balance Goal 1 (OT)    Activity/Assistive Device (Balance Goal 1, OT) standing, static;standing, dynamic  -DN      [place] Level/Cues Needed (Balance Goal 1, OT) supervision required  -DN      Time Frame (Balance Goal 1, OT) long term goal (LTG);by discharge  -DN      Progress/Outcomes (Balance Goal 1, OT) goal ongoing  -DN         Caregiver Training Goal 1 (OT-IRF)    Caregiver Training Goal 1 (OT-IRF) Pt and family to be indep with HEP, AD/AE, ADLs, functional transfers and IADL tasks upon d/c  isamar.  -DN      Time Frame (Caregiver Training Goal 1, OT-IRF) long term goal (LTG);by discharge  -DN      Progress/Outcomes (Caregiver Training Goal 1, OT-IRF) goal ongoing;goal not met  -DN        User Key  (r) = Recorded By, (t) = Taken By, (c) = Cosigned By    Initials Name Provider Type    GRACIELA Bocanegra Occupational Therapist                 Time Calculation:           Time Calculation- OT     Row Name 07/02/18 1021             Time Calculation- OT    OT Start Time 0830  -DN      OT Stop Time 0900  -DN      OT Time Calculation (min) 30 min  -DN      OT Goal Re-Cert Due Date 07/02/18  -DN        User Key  (r) = Recorded By, (t) = Taken By, (c) = Cosigned By    Initials Name Provider Type    GRACIELA Bocanegra Occupational Therapist          Therapy Suggested Charges     Code   Minutes Charges    None              Therapy Charges for Today     Code Description Service Date Service Provider Modifiers Qty    [id number] HC OT SELF CARE/MGMT/TRAIN EA 15 MIN 7/2/2018 GRACIELA Garcia GO 2                   GRACIELA Garcia  7/2/2018

## 2018-07-02 NOTE — PROGRESS NOTES
Inpatient Rehabilitation Functional Measures Assessment    Functional Measures  DAVEY Eating:  [facility] Grooming: [facility] Bathing:  [facility] Upper Body Dressing:  [facility] Lower Body Dressing:  [facility] Toileting:  [facility] Bladder Management  Level of Assistance:  [place]  Frequency/Number of Accidents this Shift:  [facility] Bowel Management  Level of Assistance: [place]  Frequency/Number of Accidents this Shift: [facility] Bed/Chair/Wheelchair Transfer:  [facility] Toilet Transfer:  [facility] Tub/Shower Transfer:  [place]    Previously Documented Mode of Locomotion at Discharge: Field  DAVEY Expected Mode of Locomotion at Discharge: [facility] Walk/Wheelchair:  [facility] Stairs:  [facility] Comprehension:  Both ( auditory and visual) modes of comprehension are used  equally. Comprehension Score = 6, Modified [place].  Patient comprehends  complex/abstract information in their primary language, requiring: Hearing Aid.  Glasses.  DAVEY Expression:  Vocal expression is the usual mode. Expression Score = 6,  Modified Independent.  Patient expresses complex/abstract information in their  primary language, requiring: Additional time. Dysarthria .  DAVEY Social Interaction:  Social Interaction Score = 6, Modified Independent.  Patient is modified independent for social interaction, requiring: Requires  additional time.  DAVEY Problem Solving:  Problem Solving Score = 6, Modified [place].  Patient  makes appropriate decisions in order to solve complex problems, but requires  extra time.  DAVEY Memory:  Memory Score = 6, Modified [place].  Patient is modified  independent for memory, requiring: Requires additional time.    Therapy Mode Minutes  Occupational Therapy: [facility]  Physical Therapy: [facility]  Speech Language Pathology:  [facility]    Signed by: Grayson Espinoza RN

## 2018-07-02 NOTE — PLAN OF CARE
Problem: Patient Care Overview  Goal: Plan of Care Review   07/02/18 1430   Patient Care Overview   IRF Plan of Care Review progress ongoing, continue;discharge pending   Progress, Functional Goals progress more gradual than expected   OTHER   Outcome Summary Patient with ongoing mild dysarthria and mild oral-phryngeal dysphagia. He has not been compliant with exercise programs prescribed to him, and required encouragement to continue exercises in sessions. Intelligibility has improved, but needs more improvement. He is currently tolerating a mechanical soft diet with NTL. ST is recommended following d/c.   Coping/Psychosocial   Plan of Care Reviewed With patient

## 2018-07-02 NOTE — PROGRESS NOTES
Inpatient Rehabilitation Functional Measures Assessment    Functional Measures  DAVEY Eating:  [facility] Grooming: [facility] Bathing:  [facility] Upper Body Dressing:  [facility] Lower Body Dressing:  [facility] Toileting:  [facility] Bladder Management  Level of Assistance:  [place]  Frequency/Number of Accidents this Shift:  [facility] Bowel Management  Level of Assistance: [place]  Frequency/Number of Accidents this Shift: [facility] Bed/Chair/Wheelchair Transfer:  [facility] Toilet Transfer:  [facility] Tub/Shower Transfer:  [place]    Previously Documented Mode of Locomotion at Discharge: Field  DAVEY Expected Mode of Locomotion at Discharge: [facility] Walk/Wheelchair:  [facility] Stairs:  [facility] Comprehension:  Auditory comprehension is the usual mode. Comprehension  Score = 6, Modified [place].  Patient comprehends complex/abstract  information in their primary language with only mild difficulty.  DVAEY Expression:  Vocal expression is the usual mode. Expression Score = 6,  Modified Independent.  Patient expresses complex/abstract information in their  primary language with only mild difficulty with tasks.  DAVEY Social Interaction:  Social Interaction Score = 6, Modified Independent.  Patient is modified independent for social interaction, requiring: Medications.    DAVEY Problem Solving:  Activity was not observed.  DAVEY Memory:  Memory Score = 6, Modified [place].  Patient is modified  independent for memory, having only mild difficulty and using self-initiated or  environmental cues to remember.    Therapy Mode Minutes  Occupational Therapy: [facility]  Physical Therapy: [place]  Speech Language Pathology:  [place]    Signed by: Cassie Garcia RN

## 2018-07-02 NOTE — THERAPY TREATMENT NOTE
Inpatient Rehabilitation - Physical Therapy Treatment Note  [facility]     Patient Name: Martín Souza  : 1940  MRN: [id number]    Today's Date: 2018                 Admit Date: 6/15/2018      Visit Dx:      ICD-10-CM ICD-9-CM   1. Dysarthria R47.1 784.51       Patient Active Problem List   Diagnosis   • CVA (cerebral vascular accident) (CMS/HCC)       Therapy Treatment          IRF Treatment Summary     Row Name 18 1401 18 1400 18 1000       Evaluation/Treatment Time and Intent    Subjective Information complains of;weakness  -DN complains of;weakness;fatigue  -KB complains of;fatigue   Simultaneous filing. User may be unaware of other data.  -KB    Document Type therapy note (daily note);discharge evaluation/summary  -DN therapy note (daily note);discharge treatment  -KB therapy note (daily note)   Simultaneous filing. User may be unaware of other data.  -KB    Mode of Treatment occupational therapy  -DN speech-language pathology;individual therapy  -KB speech-language pathology;individual therapy   Simultaneous filing. User may be unaware of other data.  -KB    Patient/Family Observations supine in bed  -DN patient seen at bedside d/t not feeling well and requested to stay in bed; demonstrated to pt how to mix nectar-thick liquids at home with powdered thickener  -KB seated in wc in his room; agreeable to therapy   Simultaneous filing. User may be unaware of other data.  -KB    Start Time (Evaluation/Treatment)  -- 1400  -KB 1000  -KB    Stop Time (Evaluation/Treatment)  -- 1430  -KB 1030  -KB    Recorded by [DN] GRACIELA Garcia [KB] Katherine L Bruton [KB] Katherine L Bruton    Row Name 18 0910             Evaluation/Treatment Time and Intent    Subjective Information complains of;weakness   fever  -LB      Document Type therapy note (daily note)  -LB      Mode of Treatment physical therapy  -LB      Patient/Family Observations up in w/c  -LB      Recorded by [LB]  Chey Owens PTA      Row Name 07/02/18 1000             Family/Support System    Health Advocacy (Family/Support System) caregiver advocates for patient's health  -DN      Recorded by [DN] GRACIELA Garcia      Row Name 07/02/18 1401 07/02/18 1000          Cognition/Psychosocial- PT/OT    Affect/Mental Status (Cognitive) WFL  -DN WFL  -DN     Orientation Status (Cognition)  -- oriented x 4  -DN     Follows Commands (Cognition)  -- follows one step commands;over 90% accuracy  -DN     Personal Safety Interventions  -- fall prevention program maintained;gait belt  -DN     Cognitive Function (Cognitive)  -- safety deficit  -DN     Safety Deficit (Cognitive)  -- mild deficit  -DN     Recorded by [DN] GRACIELA Garcia [DN] GRACIELA Garcia     Row Name 07/02/18 0910             Bed Mobility Assessment/Treatment    Rolling Left [place] (Bed Mobility) independent  -LB      Rolling Right [place] (Bed Mobility) independent  -LB      Supine-Sit [place] (Bed Mobility) independent  -LB      Sit-Supine [place] (Bed Mobility) independent  -LB      Comment (Bed Mobility) assessed on txment mat  -LB      Recorded by [LB] Chey Owens PTA      Row Name 07/02/18 1401 07/02/18 1226 07/02/18 1000       Transfer Assessment/Treatment    Bed-Chair [place] (Transfers) supervision  -DN stand by assist  -LB  --    Assistive Device (Bed-Chair Transfers) wheelchair  -DN  --  --    [place] Level (Shower Transfer)  --  -- contact guard  -DN    Assistive Device (Shower Transfer)  --  -- shower chair;grab bars/tub rail;wheelchair  -DN    [place] Level (Bathtub Transfer)  --  -- contact guard  -DN    Recorded by [DN] GRACIELA Garcia [LB] Chey Owens PTA [DN] GRACIELA Garcia    Row Name 07/02/18 0910             Transfer Assessment/Treatment    Bed-Chair [place] (Transfers) contact guard  -DN      Chair-Bed [place] (Transfers) stand by assist  -LB      Assistive Device  (Bed-Chair Transfers) wheelchair  -DN      Sit-Stand [place] (Transfers) stand by assist  -LB      Stand-Sit [place] (Transfers) stand by assist  -LB      Recorded by [DN] GRACIELA Garcia  [LB] Chey Owens PTA      Row Name 07/02/18 0910             Sit-Stand Transfer    Assistive Device (Sit-Stand Transfers) walker, front-wheeled  -LB      Recorded by [LB] Chey Owens PTA      Row Name 07/02/18 0910             Stand-Sit Transfer    Assistive Device (Stand-Sit Transfers) walker, front-wheeled  -LB      Recorded by [LB] Chey Owens PTA      Row Name 07/02/18 1000             Shower Transfer    Type (Shower Transfer) stand pivot/stand step  -DN      Recorded by [DN] GRACIELA Garcia      Row Name 07/02/18 1000             Bathtub Transfer    Type (Bathtub Transfer) stand pivot/stand step  -DN      Recorded by [DN] GRACIELA Garcia      Row Name 07/02/18 0910             Car Transfer    Type (Car Transfer) sit-stand;stand-sit  -LB      [place] Level (Car Transfer) contact guard;stand by assist  -LB      Assistive Device (Car Transfer) walker, front-wheeled  -LB      Recorded by [LB] Chey Owens PTA      Row Name 07/02/18 0910             Gait/Stairs Assessment/Training    [place] Level (Gait) contact guard;stand by assist  -LB      Assistive Device (Gait) walker, front-wheeled  -LB      Distance in Feet (Gait) 80   as far as 180 ft  -LB      Deviations/Abnormal Patterns (Gait) base of support, narrow  -LB      Bilateral Gait Deviations forward flexed posture  -LB      Left Sided Gait Deviations heel strike decreased  -LB      [place] Level (Stairs) contact guard  -LB      Handrail Location (Stairs) right side (ascending)  -LB      Number of Steps (Stairs) 12  -LB      Ascending Technique (Stairs) step-over-step  -LB      Descending Technique (Stairs) step-over-step  -LB      Recorded by [LB] Chey Owens PTA      Row Name 07/02/18 0910             Curb  Negotiation (Mobility)    [place], Curb Negotiation contact guard  -LB      Comment, Curb Negotiation (Mobility) Rwx  -LB      Recorded by [LB] Chey Owens [facility]      Row Name 07/02/18 1000             Upper Body Dressing Assessment/Treatment    Upper Body Dressing Task upper body dressing skills;set up assistance  -DN      Upper Body Dressing Position supported sitting  -DN      Set-up Assistance (Upper Body Dressing) obtain clothing  -DN      Recorded by [DN] GRACIELA Garcia      Row Name 07/02/18 1000             Lower Body Dressing Assessment/Treatment    Lower Body Dressing [place] Level doff;don;pants/bottoms;shoes/slippers;socks;underwear;contact guard assist  -DN      Lower Body Dressing Position supported sitting;supported standing  -DN      Lower Body Dressing Setup Assistance obtain clothing  -DN      Recorded by [DN] GRACIELA Garcia      Row Name 07/02/18 1000             Grooming Assessment/Treatment    Grooming [place] Level grooming skills;deodorant application;hair care, combing/brushing;oral care regimen;set up  -DN      Assistive Device (Grooming) electric razor  -DN      Grooming Position sink side  -DN      Grooming Setup Assistance obtain supplies  -DN      Recorded by [DN] GRACIELA Garcia      Row Name 07/02/18 1000             Toileting Assessment/Treatment    Toileting [place] Level toileting skills;supervision  -DN      Assistive Device Use (Toileting) grab bar/safety frame;raised toilet seat  -DN      Toileting Position supported sitting;unsupported standing  -DN      Toileting Setup Assistance obtain supplies  -DN      Recorded by [DN] GRACIELA Garcia      Row Name 07/02/18 1401             General ROM    GENERAL ROM COMMENTS BUEs wfl  -DN      Recorded by [DN] GRACIELA Garcia      Row Name 07/02/18 1401             MMT (Manual Muscle Testing)    General Manual Muscle Testing (MMT) Assessment upper extremity strength deficits identified  -DN       Comment, General Manual Muscle Testing (MMT) Assessment RUE 4+/5 LUE 4/5  -DN      Recorded by [DN] GRACIELA Garcia      Row Name 07/02/18 1401             Hand  Strength Testing    Right Hand, Setting 2 (Dynamometer Testing) 82  -DN      Left Hand, Setting 2 (Dynamometer Testing) 65  -DN      Right Hand: Tip (Pincer) Pinch Strength (Pinch Dynamometer Testing) 16  -DN      Right Hand: Lateral (Key) Pinch Strength (Pinch Dynamometer Testing) 12  -DN      Recorded by [DN] GRACIELA Garcia      Row Name 07/02/18 1401             Fine Motor Testing & Training    Results, 9 Hole Peg Test of Fine Motor Coordination-Right 24  -DN      Results, 9 Hole Peg Test of Fine Motor Coordination-Left 30  -DN      Results, Box N Block Test-Right 44  -DN      Results, Box N Block Test-Left 36  -DN      Recorded by [DN] GRACIELA Garcia      Row Name 07/02/18 1401 07/02/18 1400 07/02/18 1000       Pain Scale: Numbers Pre/Post-Treatment    Pain Scale: Numbers, Pretreatment 0/10 - no pain  -DN 0/10 - no pain  -KB 0/10 - no pain  -KB    Pain Scale: Numbers, Post-Treatment 0/10 - no pain  -DN 0/10 - no pain  -KB  --    Recorded by [DN] GRACIELA Garcia [KB] Katherine L Bruton [KB] Katherine L Bruton    Row Name 07/02/18 0910             Pain Scale: Numbers Pre/Post-Treatment    Pain Scale: Numbers, Pretreatment 0/10 - no pain  -LB      Recorded by [LB] Chey Owens PTA      Row Name 07/02/18 0910             Static Standing Balance    Comment (Unsupported Standing, Static Balance) retrieved object from floor cga, rwx  -LB      Recorded by [LB] Chey Owens PTA      Row Name 07/02/18 0910             Dynamic Standing Balance    Comment, Resists Mild Perturbations (Sitting, Dynamic Balance) amb on uneven surface x 12 ft  w rwx, CGA  -LB      Recorded by [LB] Chey Owens PTA      Row Name 07/02/18 1401             Upper Extremity Seated Therapeutic Exercise    Performed, Seated Upper Extremity (Therapeutic  Exercise) shoulder flexion/extension;shoulder abduction/adduction;wrist flexion/extension;wrist radial/ulnar deviation;forearm supination/pronation  -DN      Device, Seated Upper Extremity (Therapeutic Exercise) free weights, barbell  -DN      Exercise Type, Seated Upper Extremity (Therapeutic Exercise) AROM (active range of motion)  -DN      Expected Outcomes, Seated Upper Extremity (Therapeutic Exercise) improve functional tolerance, self-care activity  -DN      Sets/Reps Detail, Seated Upper Extremity (Therapeutic Exercise) 10x 3  -DN      Transfers Skills, Training to Functional Activity, Seated Upper Extremity (Therapeutic Exercise) beginning to transfer skills to functional activity  -DN      Comment, Seated Upper Extremity (Therapeutic Exercise) 2# dumbell, 3 sets of 10 reps and hand gripper 1 set of 20 with LUE  -DN      Recorded by [ELIAS] GRACIELA Garcia      Row Name 07/02/18 0910             Lower Extremity Standing Therapeutic Exercise    Performed, Standing Lower Extremity (Therapeutic Exercise) mini-squats;heel raises;knee flexion/extension;hip abduction/adduction  -LB      Device, Standing Lower Extremity (Therapeutic Exercise) --   hemibars  -LB      Exercise Type, Standing Lower Extremity (Therapeutic Exercise) AROM (active range of motion)  -LB      Sets/Reps Detail, Standing Lower Extremity (Therapeutic Exercise) 1/ 10  -LB      Recorded by [LB] Chey Owens PTA      Row Name 07/02/18 1401 07/02/18 1000 07/02/18 0910       Positioning and Restraints    Pre-Treatment Position in bed  -DN in bed  -DN  --    Post Treatment Position bed  -DN wheelchair  -DN wheelchair  -LB    In Wheelchair  --  -- sitting;call light within reach;with nsg  -LB    Recorded by [ELIAS] GRACIELA Garcia [DN] GRACIELA Garcia [LB] Chey Owens PTA      User Key  (r) = Recorded By, (t) = Taken By, (c) = Cosigned By    Initials Name Effective Dates    GRACIELA Bocanegra 06/08/18 -     LB Chey Owens  ERIKA 03/07/18 -     KB Katherine L Bruton 03/07/18 -                Physical Therapy Education     Title: PT OT SLP Therapies (Done)     Topic: Physical Therapy (Done)     Point: Mobility training (Done)    Learning Progress Summary     Learner Status Readiness Method Response Comment Documented by    Patient Done Acceptance E,H DU  LB 07/02/18 1547     Done Acceptance E VU,NR Reinforced need for AD.  Pt denies concerns re pass tomorrow. KP 06/30/18 1332     Done Acceptance E,D VU,DU curb, car, gait, stairs LB 06/28/18 0915     Done Acceptance E,D VU,NR  LB 06/27/18 1433     Done Eager E,TB VU,NR  EE 06/26/18 1104     Done Acceptance E VU,NR car, stairs, curb LB 06/25/18 1506     Done Acceptance E VU,NR  LB 06/23/18 1310     Done Acceptance E VU,NR curb, stairs LB 06/22/18 0927     Done Eager E VU,DU,NR   06/21/18 0913     Done Acceptance E VU,NR  LB 06/19/18 1557     Done Acceptance E,TB VU,NR transfers, gait, stairs LB 06/18/18 1521    Family Done Acceptance E,H DU  LB 07/02/18 1547    Significant Other Done Acceptance E VU,DU curb, ramp, gait LB 06/29/18 0931     Done Acceptance E,D VU,DU curb, car, gait, stairs LB 06/28/18 0915     Done Acceptance E,D VU,DU gait, car tsf LB 06/20/18 1443          Point: Home exercise program (Done)    Learning Progress Summary     Learner Status Readiness Method Response Comment Documented by    Patient Done Acceptance E,H DU  LB 07/02/18 1547     Done Acceptance E VU,NR Reinforced need for AD.  Pt denies concerns re pass tomorrow. KP 06/30/18 1332     Done Eager E,TB VU,NR   06/26/18 1104     Done Eager E VU,DU,NR   06/21/18 0913    Family Done Acceptance E,H DU  LB 07/02/18 1547          Point: Body mechanics (Done)    Learning Progress Summary     Learner Status Readiness Method Response Comment Documented by    Patient Done Acceptance E,H DU  LB 07/02/18 1547     Done Acceptance E,D VU,DU curb, car, gait, stairs LB 06/28/18 0915     Done Lazaro BARNES,MELODY GRAHAM,NR  JOSE 06/26/18  1104     Active Acceptance D NR  CC 06/21/18 0023     Done Acceptance E VU,NR   06/19/18 1557    Family Done Acceptance E,H DU  LB 07/02/18 1547    Significant Other Done Acceptance E,D VU,DU curb, car, gait, stairs  06/28/18 0915          Point: Precautions (Done)    Learning Progress Summary     Learner Status Readiness Method Response Comment Documented by    Patient Done Acceptance E,H DU  LB 07/02/18 1547     Done Acceptance E VU,NR Reinforced need for AD.  Pt denies concerns re pass tomorrow.  06/30/18 1332     Done Eager E,TB VU,NR   06/26/18 1104     Done Acceptance E VU  MD 06/16/18 1056    Family Done Acceptance E,H DU   07/02/18 1547                      User Key     Initials Effective Dates Name Provider Type Discipline     03/07/18 -  Chey Owens, PTA Physical Therapy Assistant PT     04/03/18 -  Daphne Manning, PT Physical Therapist PT    MD 04/03/18 -  Lupis Calderon, PT Physical Therapist PT     04/03/18 -  Erin Ng, PT Physical Therapist PT     01/17/18 -  Mei Young, RN Registered Nurse Nurse                  PT Recommendation and Plan                               Outcome Measures     Row Name 07/02/18 1400             Modified [place] Scale    Modified [place] Scale 3 - Moderate disability.  Requiring some help, but able to walk without assistance.  -DN         Functional Assessment    Outcome Measure Options Modified [place]  -DN        User Key  (r) = Recorded By, (t) = Taken By, (c) = Cosigned By    Initials Name Provider Type    ELIAS Og OTR Occupational Therapist             Time Calculation:           PT Charges     Row Name 07/02/18 1533 07/02/18 0943          Time Calculation    Start Time 1100  -LB 0900  -LB     Stop Time 1130  -LB 0930  -LB     Time Calculation (min) 30 min  -LB 30 min  -LB       User Key  (r) = Recorded By, (t) = Taken By, (c) = Cosigned By    Initials Name Provider Type    LB Chey Owens PTA Physical Therapy Assistant             Therapy Charges for Today     Code Description Service Date Service Provider Modifiers Qty    [id number] HC PT THER PROC EA 15 MIN 7/2/2018 Chey Owens, PTA GP 4            PT G-Codes  Outcome Measure Options: Boo Owens, PTA  7/2/2018

## 2018-07-02 NOTE — PLAN OF CARE
Problem: Patient Care Overview  Goal: Plan of Care Review  Outcome: Ongoing (interventions implemented as appropriate)   07/01/18 2239   Patient Care Overview   IRF Plan of Care Review progress ongoing, continue   Progress, Functional Goals demonstrating adequate progress   OTHER   Outcome Summary Pt. is calm and cooperative with staff. No complained of any pain. A&O x 4. PCR swab nasopharynx ordered. Got the sample and sent to lab. Melatonin 3mg and Tylenol 650mg PO PRN given per pt. required. CPAP uses at night. Whole Meds with Apple Sauce. Assist x 1 with Wheelchair. Hearing aid in place. No new issues to note at this time. Will continue to monitor.   Coping/Psychosocial   Plan of Care Reviewed With patient     Goal: Coping Plan  Outcome: Ongoing (interventions implemented as appropriate)   07/01/18 2239   Coping Plan   Demonstration of Effective Coping Strategies demonstrating adequate progress       Problem: Stroke (Hemorrhagic) (Adult)  Goal: Signs and Symptoms of Listed Potential Problems Will be Absent, Minimized or Managed (Stroke)  Outcome: Ongoing (interventions implemented as appropriate)   07/01/18 2239   Goal/Outcome Evaluation   Problems Assessed (Stroke (Hemorrhagic)) all   Problems Present (Stroke (Hemorrhagic)) none       Problem: Fall Risk (Adult)  Goal: Absence of Fall  Outcome: Ongoing (interventions implemented as appropriate)      Problem: Dysphagia (Adult)  Goal: Functional/Safe Swallow  Outcome: Ongoing (interventions implemented as appropriate)   07/01/18 2239   Dysphagia (Adult)   Functional/Safe Swallow making progress toward outcome     Goal: Compensatory Techniques to Improve Safety/Function with Swallowing  Outcome: Ongoing (interventions implemented as appropriate)   07/01/18 2239   Dysphagia (Adult)   Compensatory Techniques to Improve Safety/Function with Swallowing making progress toward outcome       Problem: Skin Injury Risk (Adult)  Goal: Skin Health and Integrity  Outcome:  Ongoing (interventions implemented as appropriate)   07/01/18 0913   Skin Injury Risk (Adult)   Skin Health and Integrity making progress toward outcome

## 2018-07-02 NOTE — PROGRESS NOTES
Inpatient Rehabilitation Functional Measures Assessment and Plan of Care    Plan of Care  Updated Problems/Interventions  Field    Functional Measures  DAVEY Eating:  [facility] Grooming: [facility] Bathing:  [facility] Upper Body Dressing:  [facility] Lower Body Dressing:  [facility] Toileting:  [facility] Bladder Management  Level of Assistance:  [place]  Frequency/Number of Accidents this Shift:  [facility] Bowel Management  Level of Assistance: [place]  Frequency/Number of Accidents this Shift: [facility] Bed/Chair/Wheelchair Transfer:  Bed/chair/wheelchair Transfer Score = 5.  Patient is supervision/set-up for transferring to and from the  bed/chair/wheelchair, requiring: No assistive devices were required.  DAVEY Toilet Transfer:  [facility] Tub/Shower Transfer:  [place]    Previously Documented Mode of Locomotion at Discharge: Field  DAVEY Expected Mode of Locomotion at Discharge: [facility] Walk/Wheelchair:  WHEELCHAIR OBSERVATION   Activity was not observed.    WALK OBSERVATION   Walk Distance Scale = 2.  Distance walked is 50 -149 feet. Walk Score = 2.  Patient performs 75% or more of effort and requires minimal assistance.  Incidental assistance, contact guard or steadying was provided. Patient walked a  distance of 80 feet. Patient requires the following assistive device(s): Rolling  walker.  DAVEY Stairs:  Stairs Score = 4.  Incidental help/contact guard/steadying was  provided. Patient performs 75% or more of effort and requires minimal  assistance. Patient negotiated 12 stairs. Patient requires the following  assistive device(s): Handrail(s).    DAVEY Comprehension:  [facility] Expression:  [facility] Social Interaction:  [facility] Problem Solving:  [facility] Memory:  [place]    Therapy Mode Minutes  Occupational Therapy: [place]  Physical Therapy: Individual: 60 minutes.  Speech Language Pathology:  [place]    Signed by: Cehy Owens PTA

## 2018-07-02 NOTE — PROGRESS NOTES
Talked with patient this morning. Still has had temperature on/off so did not go out on pass yesterday as planned. Discussed possible postponing his d/c tomorrow if still has fever. He is wanting to go home. Talked with wife, by phone. She agrees with patient staying if still has fever as she doesn't want anything to happen at home once d/c. Patient to be scheduled for outpatient therapy here at [facility].

## 2018-07-02 NOTE — PROGRESS NOTES
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   Dysarthria  Dysphagia  Dysphonia  PAF with previous chronic anticoagulation  CKD3  HTN       Subjective     History of Present Illness    Subjective     101.0 but afebrile now.  He is without any complaints.  No cough, shortness of air, bladder complaints or gout complaints.  Strength is stable on the left side.  Coordination stable.  Tolerates therapies.  Discussed possible viral illness .  Pro-calcitonin was unremarkable and will recheck pro-calcitonin and and white blood cell count today    History taken from: patient    Objective     Vital Signs  Temp:  [97.4 °F (36.3 °C)-101 °F (38.3 °C)] 101 °F (38.3 °C)  Heart Rate:  [] 106  Resp:  [16-18] 18  BP: (117-137)/(65-81) 131/68    Objective:  Vital signs: (most recent): Blood pressure 123/71, pulse 84, temperature 98.7 °F (37.1 °C), temperature source Oral, resp. rate 18, height 170.2 cm (67\"), weight 80.6 kg (177 lb 12.8 oz), SpO2 96 %.          PHYSICAL EXAM  Mental status-awake alert  Lungs-clear to auscultation  Heart-S1-S2. RRR  Abdomen-normoactive bowel sounds soft and nontender  Extremities-no signficant edema BLE  Neurologic-dysarthria.  Dysmetria with the left upper extremity improved.  Left hemiparesis - improved. Takes resistance on left side well with left EF/FF/hand intrinsics/KE/ADF.   Results Review:     I reviewed the patient's new clinical results.       Results from last 7 days  Lab Units 06/29/18  1919   SODIUM mmol/L 137   POTASSIUM mmol/L 3.3*   CHLORIDE mmol/L 100   CO2 mmol/L 22.9   BUN mg/dL 20   CREATININE mg/dL 1.36*   CALCIUM mg/dL 9.0   BILIRUBIN mg/dL 0.6   ALK PHOS U/L 130*   ALT (SGPT) U/L 127*   AST (SGOT) U/L 46*   GLUCOSE mg/dL 203*       Results from last 7 days  Lab Units 06/28/18  1626   WBC 10*3/mm3 7.76   HEMOGLOBIN g/dL 13.8   HEMATOCRIT % 40.3*   PLATELETS 10*3/mm3 " 137*         VFSS June 18 - FINDINGS: Patient demonstrated a mild oropharyngeal dysphagia  characterized by penetration of thin and mixed consistencies. Mild to  moderate residue with pooling of oral residue to valleculae and pyriform  sinuses. Reduced timing and tongue base retraction. Epiglottic  deflection improved with weight of bolus.    VFSS June 29 - no change.     -------------------------------------------------------    NONCONTRAST HEAD CT 06/21/2018     CLINICAL HISTORY: Follow-up for right anterior pontomedullary  hemorrhage.     TECHNIQUE: Spiral CT images were obtained from the base of the skull to  the vertex without intravenous contrast. Images were reformatted and  submitted in 3 mm thick axial CT sections. Additional 2 mm thick  sagittal and coronal reconstructions were performed with brain  algorithm.     This is correlated to outside noncontrast head CT Kemi 10, 2018 as well  as outside MRI of the head and MRV of the dural venous sinuses on  06/09/2018. There are no prior studies from [facility]  for comparison.     FINDINGS: There is some patchy low-density in periventricular extending  into the subcortical white matter of the cerebral hemispheres consistent  with mild-to-moderate small vessel disease. There is a 17 x 10 mm focal  area of encephalomalacia in the medial left frontoparietal region  consistent with an old infarct in the left anterior cerebral artery  territory. The density of the previously identified acute  intraparenchymal hemorrhage extending from the right anterior velma into  the right anterior medulla has decreased and there is now a chronic  hematoma cavity measuring about 9 x 7 mm in anterior posterior and  medial lateral dimension. There is no surrounding edema and no mass  effect. The etiology of the hemorrhage remains uncertain. The ventricles  are normal in size. There is no midline shift. No extra-axial fluid  collections are identified and no acute  intracranial hemorrhage is seen.  The paranasal sinuses and mastoid air cells and middle ear cavities are  clear. Calcified atherosclerotic plaques are present in the cavernous  segments of the internal carotid arteries bilaterally.     IMPRESSION:     1. Since outside MRI of the head and MRV of the dural sinuses 06/09/2018  and outside noncontrast head CT 06/10/2018, there have been expected  evolutionary changes in the hemorrhage in the right anterior  pontomedullary junction. The density of the hemorrhage has significantly  decreased and now there is a 9 x 7 x 9 mm chronic hematoma cavity  extending from the right anterior inferior velma into the right anterior  medulla. There has been resolution of surrounding edema. There is no  mass effect. The etiology of the right anterior pontomedullary  hemorrhage remains uncertain. It could be from an underlying cavernous  malformation or hemorrhagic transformation of an arterial or venous  infarct. At some point a follow-up MRI of the head with and without  contrast and MRA/MRV could be obtained to reevaluate and compared to the  outside MRI/MRV 06/09/2018.     2. Mild-to-moderate small vessel disease in the cerebral white matter  and there is a 17 x 10 mm old superior medial left frontoparietal  infarct in the left anterior cerebral artery territory. The remainder of  the head CT is unremarkable.  -------------------------------------------------------------------------------    Medication Review: done  Scheduled Meds:    amLODIPine 10 mg Oral Q24H   atorvastatin 40 mg Oral Nightly   cetirizine 10 mg Oral Daily   dabigatran etexilate 150 mg Oral Q12H   hydrALAZINE 50 mg Oral Q8H   levothyroxine 112 mcg Oral Q AM   lisinopril 20 mg Oral Q12H   metoprolol tartrate 25 mg Oral Q12H   multivitamin 1 tablet Oral Daily   pantoprazole 40 mg Oral QAM   PARoxetine 10 mg Oral Daily   [START ON 7/7/2018] PARoxetine 5 mg Oral Daily     Continuous Infusions:   PRN Meds:.•   acetaminophen  •  hydrALAZINE  •  melatonin  •  MEXSANA  •  nitroglycerin  •  ondansetron      Assessment/Plan     Active Problems:    CVA (cerebral vascular accident)      Assessment & Plan  S/p  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  Follow-up CT of the head on June 21 here with follow-up neurosurgery next day to determine possible resuming anticoagulation  June 25 - Per Dr Mendes, to hold Eliquis for at least two weeks pending [facility]. Seen by Dr Holland Neurosurgery on June 22 - cleared to resume anticoagulation. Discussed risks and benefits of resuming anticoagulation with patient today. He was cleared by Neurosurgery to resume anticoagulation.  Patient wishes to resume Eliquis.Will plan to start with dose this evening.   June 26 - patient declined Eliquis last PM as  his cardiologist wished to assess options.   Reviewed with Dr Carrasco - Cardiology - today. He has call out to Neurosurgery regarding resuming Eliquis vs change to Pradaxa as has a reversal agent available.     June 28- awaiting decision from Dr. Carrasco  June 30 - Discussed with Dr Carrasco - will start Pradaxa. Patient in agreemant.        Psych- on Paxil chronically - ? Discontinue if resumes oral anticoagulant as can increase risk of bleeding. June 22- CT stable. Anticipate will be resuming Eliquis when sees Neurosurgery. Reviewed with patient. Dr Talley not available today. Will taper off Paxil from 15 mg daily to 10 mg daily for 2 weeks, then 5 mg daily for two weeks, then stop. Will plan to discuss with PCP other options for anxiety. June 26- discussed with Dr Talley yesterday - plan for starting Buspar once tapers further on Paxil.     Elevated transaminases - June 30 - decreased atorvastatin to 1/2 dose to 40 mg daily.   Dysarthria    Dysphagia- advanced to [facility] soft,NTL. Add E-stim for dysphagia.  - VFSS 6-29 - no change  Dysphonia    PAF with previous chronic  anticoagulation    CKD3-  June 30 - Cr 1.39    HTN- amlodipine/lisinopril/metoprolol. June 20 - BP elevated last PM - added hydralazine 10 mg q 6 hours prn SBP> 160 or DBP> 100.  June 21-(has not received any when necessary hydralazine) added scheduled hydralazine 10 mg by mouth every 8 hours.  Has bradycardia pulse in the 50s so did not increase metoprolol.  On amlodipine 10 mg a day and lisinopril 20 mg twice a day. June 22- BP still elevated with . Increase Hydralazine to 25 mg q 8 hours. June 26 - edema 1+ BLE on amlodipine 10 mg daily. Had edema in past. 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 If BP better control, will decrease amlodipine to 5 mg. Consider diuretic. Will review with Cardiology. June 29 - BP increased when has temp elevation but better than afternoon. Edema better BLE so will note change from Norvasc 10 mg presently. June 30 -Follow BP, may increase hydralazine.  Edema resolved BLE. July 1 - -[phone] this AM. Review additional hydralazine 10 mg now at 10:30 and then increase from 25 mg q 8 hours to 50 mg q 8 hours.     Gout -   controlled with Tylenol - better.     Fever   June 28 -   Fever 101.5 . Nursing reports chills/ feels cold. No pulmonary or dysuria. No significant rash, No recent skin lesion, No recent infections.   Lungs CTA. Heart Mild tachy, regular, AB - NABS,soft, NT. Ext - left first MTP non-tender. Left calf larger than right.   Will check CXR, UA, Blood cultures with fever and chills, CBC.  Doubt gout fever with chills as no pain left first MTP now.  Doubt DVT with c/o chills but LLE larger than right and will check venous duplex.     BLE Venous duplex negative for DVT.  CXR   clear.   UA negative.    Results from last 7 days  Lab Units 06/28/18  1626 06/23/18  0412   WBC 10*3/mm3 7.76 7.85   HEMOGLOBIN g/dL 13.8 13.0*   HEMATOCRIT % 40.3* 39.9*   PLATELETS 10*3/mm3 137* 188   Results for MAURA NORMAN (MRN [id number]) as of 6/28/2018 17:43    Ref.  Range 6/28/2018 16:26   Procalcitonin Latest Ref Range: 0.10 - 0.25 ng/mL 0.24      Will hold on antibiotics with low procalcitonin.  Could possibly have some pneumonitis as cause with his dysphagia.     June 29 - temp 100.4 this AM but afebrile this afternoon. Will recheck CBC. Will also recheck CMP to assess renal insuff and LFTs on high dose statin. Blood culture negative at 24 hours. LLE still larger than RLE but noticeable less - may be neurogenic edema.      July 1 - Temp 100.3 last PM, afebrile now. Cough greenish sputum up today, no previous pulm complaints. Reviewed will check viral panel, sputum specimen.  July 2 - viral panel negative. Blood culture negative at 3 days. Temp 101.0 this AM. Suspect represents viral illness. Recheck pro-calcitonin and WBC today.      Continue rehabilitation program.  Physical therapy 1 hour, Occupational therapy 1 hour, speech 1 hour, 5 days a week.  Rehabilitation nursing for carryover monitoring of his cardiac status, neurologic status, bowel bladder and skin.  Ongoing physician follow-up.  Weekly team conferences.  Goals and estimate length of stay and rehabilitation prognosis/medical prognosis indeterminate at this time    TEAM JOVANY- June 20 - BED SUP,TRANSFERS CTG. GAIT 160 FEET MIN ASSIT. UBD SET UP. LBD MIN ASSIST.  MILD TO MODERATE DYSARTHRIA. LEFT FACIAL DROP.  DYSPHAGIA- MECH SOFT, NTL. ADD E-STIM  COGNITIIVE SCREEN APPEARS WFL. FURTHER TESTING FOR HIGHER LEVEL. CONTINENT BOWEL AND BLADDER  ELOS- 14 days  Addendum: Patient had fall in bathroom landed on back against the wall. He denies hitting head. Denies any significant back pain, no new numbness or weakness. Tolerates sitting. Order neuro checks q 4 hours x 2, then q shift.    TEAM CNF - June 27 - ADLS SBA-SET UP. . TRANSFERS CTG. GAIT 240 FEET CTG. 21 STAIRS CTG. DYSPHAGIA - TOLERATES TRIALS OF THIN LIQUIDS. E-STIM FOR DYSPHAGIA. USING STRATEGIES FOR ARTICULATION. CUES FOR SAFETY STRATEGIES WITH NURSING.  CONTINENT.  BNE (Active)  Att'n. - WNL  Exec Fx. - WNL  Rsng/Jgmnt - WNL  Arith - Min Imp.  Visuospatial Skills - WNL  Visual Mem. - WNL  Verbal Mem - WNL  Emot - Pt denied dep/anx  ELOS - AMBER Mckeon MD  07/02/18  10:09 AM    Time:

## 2018-07-02 NOTE — PROGRESS NOTES
Inpatient Rehabilitation Functional Measures Assessment and Plan of Care    Plan of Care  Updated Problems/Interventions  Field    Functional Measures  DAVEY Eating:  Activity was not observed.  DAVEY Grooming: [facility] Bathing:  [facility] Upper Body Dressing:  [facility] Lower Body Dressing:  [facility] Toileting:  [facility] Bladder Management  Level of Assistance:  [facility]ans  Frequency/Number of Accidents this Shift:  [facility] Bowel Management  Level of Assistance: [facility]  Frequency/Number of Accidents this Shift: [facility] Bed/Chair/Wheelchair Transfer:  [facility] Toilet Transfer:  [facility] Tub/Shower Transfer:  Gans    Previously Documented Mode of Locomotion at Discharge: Field  DAVEY Expected Mode of Locomotion at Discharge: [facility] Walk/Wheelchair:  [facility] Stairs:  [facility] Comprehension:  Auditory comprehension is the usual mode. Comprehension  Score = 7, Independent.  Patient comprehends complex/abstract information in  their primary language.  Patient is completely independent for auditory  comprehension.  There are no activity limitations.  DAVEY Expression:  Vocal expression is the usual mode. Expression Score = 6,  Modified Independent.  Patient expresses complex/abstract information in their  primary language with only mild difficulty with tasks.  DAVEY Social Interaction:  Social Interaction Score = 5, Supervision.  Patient may  require encouragement to initiate participation. Patient requires directing only  under stressful or unfamiliar conditions, but less than 10% of the time, for the  following behavior(s):  DAVEY Problem Solving:  Activity was not observed.  DAVEY Memory:  Activity was not observed.    Therapy Mode Minutes  Occupational Therapy: Gans  Physical Therapy: Gans  Speech Language Pathology:  Individual: 60 minutes.    Signed by: Katherine Bruton, SLP

## 2018-07-02 NOTE — THERAPY DISCHARGE NOTE
Inpatient Rehabilitation - Occupational Therapy Treatment Note/Discharge  [facility]     Patient Name: Martín Souza  : 1940  MRN: [id number]  Today's Date: 2018               Admit Date: 6/15/2018    Visit Dx:     ICD-10-CM ICD-9-CM   1. Dysarthria R47.1 784.51     Patient Active Problem List   Diagnosis   • CVA (cerebral vascular accident)       Therapy Treatment        IRF Treatment Summary     Row Name 18 1401 18 1000 18 0910       Evaluation/Treatment Time and Intent    Subjective Information complains of;weakness  -DN complains of;fatigue   Simultaneous filing. User may be unaware of other data.  -KB complains of;weakness   fever  -LB    Document Type therapy note (daily note);discharge evaluation/summary  -DN therapy note (daily note)   Simultaneous filing. User may be unaware of other data.  -KB therapy note (daily note)  -LB    Mode of Treatment occupational therapy  -DN speech-language pathology;individual therapy   Simultaneous filing. User may be unaware of other data.  -KB physical therapy  -LB    Patient/Family Observations supine in bed  -DN seated in wc in his room; agreeable to therapy   Simultaneous filing. User may be unaware of other data.  -KB up in w/c  -LB    Start Time (Evaluation/Treatment)  -- 1000  -KB  --    Stop Time (Evaluation/Treatment)  -- 1030  -KB  --    Recorded by [ELIAS] GRACIELA Garcia [KB] Katherine L Bruton [LB] Chey Owens, ERIKA    Row Name 18 1000             Family/Support System    Health Advocacy (Family/Support System) caregiver advocates for patient's health  -DN      Recorded by [DN] GRACIELA Garcia      Row Name 18 1401 18 1000          Cognition/Psychosocial- PT/OT    Affect/Mental Status (Cognitive) WFL  -DN WFL  -DN     Orientation Status (Cognition)  -- oriented x 4  -DN     Follows Commands (Cognition)  -- follows one step commands;over 90% accuracy  -DN     Personal Safety Interventions  -- fall  prevention program maintained;gait belt  -DN     Cognitive Function (Cognitive)  -- safety deficit  -DN     Safety Deficit (Cognitive)  -- mild deficit  -DN     Recorded by [DN] Trip Og OTR [DN] Trip Og OTR     Row Name 07/02/18 0910             Bed Mobility Assessment/Treatment    Rolling Left [place] (Bed Mobility) independent  -LB      Rolling Right [place] (Bed Mobility) independent  -LB      Supine-Sit [place] (Bed Mobility) independent  -LB      Sit-Supine [place] (Bed Mobility) independent  -LB      Comment (Bed Mobility) assessed on txment mat  -LB      Recorded by [LB] Chey Owens PTA      Row Name 07/02/18 1401 07/02/18 1226 07/02/18 1000       Transfer Assessment/Treatment    Bed-Chair [place] (Transfers) supervision  -DN stand by assist  -LB  --    Assistive Device (Bed-Chair Transfers) wheelchair  -DN  --  --    [place] Level (Shower Transfer)  --  -- contact guard  -DN    Assistive Device (Shower Transfer)  --  -- shower chair;grab bars/tub rail;wheelchair  -DN    [place] Level (Bathtub Transfer)  --  -- contact guard  -DN    Recorded by [DN] Trip Og OTR [LB] Chey Owens PTA [DN] GRACIELA Garcia    Row Name 07/02/18 0910             Transfer Assessment/Treatment    Bed-Chair [place] (Transfers) contact guard  -DN      Chair-Bed [place] (Transfers) stand by assist  -LB      Assistive Device (Bed-Chair Transfers) wheelchair  -DN      Sit-Stand [place] (Transfers) stand by assist  -LB      Stand-Sit [place] (Transfers) stand by assist  -LB      Recorded by [DN] Trip Og OTR  [LB] Chey Owens PTA      Row Name 07/02/18 0910             Sit-Stand Transfer    Assistive Device (Sit-Stand Transfers) walker, front-wheeled  -LB      Recorded by [LB] Chey Owens PTA      Row Name 07/02/18 0910             Stand-Sit Transfer    Assistive Device (Stand-Sit Transfers) walker, front-wheeled  -LB       Recorded by [LB] Chey Owens PTA      Row Name 07/02/18 1000             Shower Transfer    Type (Shower Transfer) stand pivot/stand step  -DN      Recorded by [DN] GRACIELA Garcia      Row Name 07/02/18 1000             Bathtub Transfer    Type (Bathtub Transfer) stand pivot/stand step  -DN      Recorded by [DN] GRACIELA Garcia      Row Name 07/02/18 0910             Car Transfer    Type (Car Transfer) sit-stand;stand-sit  -LB      [place] Level (Car Transfer) contact guard;stand by assist  -LB      Assistive Device (Car Transfer) walker, front-wheeled  -LB      Recorded by [LB] Chey Owens PTA      Row Name 07/02/18 0910             Gait/Stairs Assessment/Training    [place] Level (Gait) contact guard;stand by assist  -LB      Assistive Device (Gait) walker, front-wheeled  -LB      Distance in Feet (Gait) 80   as far as 180 ft  -LB      Deviations/Abnormal Patterns (Gait) base of support, narrow  -LB      Bilateral Gait Deviations forward flexed posture  -LB      Left Sided Gait Deviations heel strike decreased  -LB      [place] Level (Stairs) contact guard  -LB      Handrail Location (Stairs) right side (ascending)  -LB      Number of Steps (Stairs) 12  -LB      Ascending Technique (Stairs) step-over-step  -LB      Descending Technique (Stairs) step-over-step  -LB      Recorded by [LB] Chey Owens PTA      Row Name 07/02/18 0910             Curb Negotiation (Mobility)    [place], Curb Negotiation contact guard  -LB      Comment, Curb Negotiation (Mobility) Rwx  -LB      Recorded by [LB] Chey Owens PTA      Row Name 07/02/18 1000             Upper Body Dressing Assessment/Treatment    Upper Body Dressing Task upper body dressing skills;set up assistance  -DN      Upper Body Dressing Position supported sitting  -DN      Set-up Assistance (Upper Body Dressing) obtain clothing  -DN      Recorded by [DN] GRACIELA Garcia      Row Name 07/02/18 1000              Lower Body Dressing Assessment/Treatment    Lower Body Dressing [place] Level doff;don;pants/bottoms;shoes/slippers;socks;underwear;contact guard assist  -DN      Lower Body Dressing Position supported sitting;supported standing  -DN      Lower Body Dressing Setup Assistance obtain clothing  -DN      Recorded by [DN] Trip Og GRACIELA      Row Name 07/02/18 1000             Grooming Assessment/Treatment    Grooming [place] Level grooming skills;deodorant application;hair care, combing/brushing;oral care regimen;set up  -DN      Assistive Device (Grooming) electric razor  -DN      Grooming Position sink side  -DN      Grooming Setup Assistance obtain supplies  -DN      Recorded by [DN] Trip Og JACI      Row Name 07/02/18 1000             Toileting Assessment/Treatment    Toileting [place] Level toileting skills;supervision  -DN      Assistive Device Use (Toileting) grab bar/safety frame;raised toilet seat  -DN      Toileting Position supported sitting;unsupported standing  -DN      Toileting Setup Assistance obtain supplies  -DN      Recorded by [DN] Trip Og OT      Row Name 07/02/18 1401             General ROM    GENERAL ROM COMMENTS BUEs wfl  -DN      Recorded by [DN] Trip Og JACI      Row Name 07/02/18 1401             MMT (Manual Muscle Testing)    General Manual Muscle Testing (MMT) Assessment upper extremity strength deficits identified  -DN      Comment, General Manual Muscle Testing (MMT) Assessment RUE 4+/5 LUE 4/5  -DN      Recorded by [DN] Trip Og OTJACI      Row Name 07/02/18 1401             Hand  Strength Testing    Right Hand, Setting 2 (Dynamometer Testing) 82  -DN      Left Hand, Setting 2 (Dynamometer Testing) 65  -DN      Right Hand: Tip (Pincer) Pinch Strength (Pinch Dynamometer Testing) 16  -DN      Right Hand: Lateral (Key) Pinch Strength (Pinch Dynamometer Testing) 12  -DN      Recorded by [DN] GRACIELA Garcia      Row Name 07/02/18 1401              Fine Motor Testing & Training    Results, 9 Hole Peg Test of Fine Motor Coordination-Right 24  -DN      Results, 9 Hole Peg Test of Fine Motor Coordination-Left 30  -DN      Results, Box N Block Test-Right 44  -DN      Results, Box N Block Test-Left 36  -DN      Recorded by [DN] GRACIELA Garcia      Row Name 07/02/18 1401 07/02/18 1000 07/02/18 0910       Pain Scale: Numbers Pre/Post-Treatment    Pain Scale: Numbers, Pretreatment 0/10 - no pain  -DN 0/10 - no pain  -KB 0/10 - no pain  -LB    Pain Scale: Numbers, Post-Treatment 0/10 - no pain  -DN  --  --    Recorded by [DN] GRACIELA Garcia [KB] Katherine L Bruton [LB] Chey Owens PTA    Row Name 07/02/18 0910             Static Standing Balance    Comment (Unsupported Standing, Static Balance) retrieved object from floor cga, rwx  -LB      Recorded by [LB] Chey Owens PTA      Row Name 07/02/18 0910             Dynamic Standing Balance    Comment, Resists Mild Perturbations (Sitting, Dynamic Balance) amb on uneven surface x 12 ft  w rwx, CGA  -LB      Recorded by [LB] Chey Owens PTA      Row Name 07/02/18 1401             Upper Extremity Seated Therapeutic Exercise    Performed, Seated Upper Extremity (Therapeutic Exercise) shoulder flexion/extension;shoulder abduction/adduction;wrist flexion/extension;wrist radial/ulnar deviation;forearm supination/pronation  -DN      Device, Seated Upper Extremity (Therapeutic Exercise) free weights, barbell  -DN      Exercise Type, Seated Upper Extremity (Therapeutic Exercise) AROM (active range of motion)  -DN      Expected Outcomes, Seated Upper Extremity (Therapeutic Exercise) improve functional tolerance, self-care activity  -DN      Sets/Reps Detail, Seated Upper Extremity (Therapeutic Exercise) 10x 3  -DN      Transfers Skills, Training to Functional Activity, Seated Upper Extremity (Therapeutic Exercise) beginning to transfer skills to functional activity  -DN      Comment, Seated Upper  Extremity (Therapeutic Exercise) 2# dumbell, 3 sets of 10 reps and hand gripper 1 set of 20 with LUE  -DN      Recorded by [ELIAS] GRACIELA Garcia      Row Name 07/02/18 1401 07/02/18 1000 07/02/18 0910       Positioning and Restraints    Pre-Treatment Position in bed  -DN in bed  -DN  --    Post Treatment Position bed  -DN wheelchair  -DN wheelchair  -LB    In Wheelchair  --  -- sitting;call light within reach;with nsg  -LB    Recorded by [ELIAS] GRACIELA Garcia [DN] GRACIELA Garcia [LB] Chey Owens, PTA      User Key  (r) = Recorded By, (t) = Taken By, (c) = Cosigned By    Initials Name Effective Dates    GRACIELA Bocanegra 06/08/18 -     LB Chey Owens PTA 03/07/18 -     KB Katherine L Bruton 03/07/18 -                        OT IRF GOALS     Row Name 07/02/18 1400 06/25/18 1200          Transfer Goal 1 (OT-IRF)    Activity/Assistive Device (Transfer Goal 1, OT-IRF) toilet;shower chair;walk-in shower;walker, rolling  -DN toilet;shower chair;walk-in shower;walker, rolling  -DN     [place] Level (Transfer Goal 1, OT-IRF) supervision required;verbal cues required  -DN supervision required;verbal cues required  -DN     Time Frame (Transfer Goal 1, OT-IRF)  -- short term goal (STG)  -DN     Barriers (Transfer Goal 1, OT-IRF) pt ill last couple of days   -DN  --     Progress/Outcomes (Transfer Goal 1, OT-IRF) goal not met  -DN good progress toward goal;goal revised this date;goal ongoing  -DN        Transfer Goal 2 (OT-IRF)    Activity/Assistive Device (Transfer Goal 2, OT-IRF) toilet;shower chair;walk-in shower;walker, rolling  -DN toilet;shower chair;walk-in shower;walker, rolling  -DN     [place] Level (Transfer Goal 2, OT-IRF) supervision required;verbal cues required  -DN supervision required;verbal cues required  -DN     Time Frame (Transfer Goal 2, OT-IRF)  -- long term goal (LTG)  -DN     Progress/Outcomes (Transfer Goal 2, OT-IRF) goal not met  -DN goal revised this date;goal  ongoing  -DN        Bathing Goal 1 (OT-IRF)    Activity/Device (Bathing Goal 1, OT-IRF) bathing skills, all;grab bar/tub rail;hand-held shower spray hose;shower chair  -DN bathing skills, all;grab bar/tub rail;hand-held shower spray hose;shower chair  -DN     [place] Level (Bathing Goal 1, OT-IRF)  -- supervision required;verbal cues required  -DN     Time Frame (Bathing Goal 1, OT-IRF)  -- short term goal (STG)  -DN     Progress/Outcomes (Bathing Goal 1, OT-IRF) goal met;goal revised this date  -DN goal met;goal revised this date  -DN        Bathing Goal 2 (OT-IRF)    Activity/Device (Bathing Goal 2, OT-IRF) bathing skills, all;grab bar/tub rail;hand-held shower spray hose;shower chair  -DN bathing skills, all;grab bar/tub rail;hand-held shower spray hose;shower chair  -DN     [place] Level (Bathing Goal 2, OT-IRF)  -- supervision required  -DN     Time Frame (Bathing Goal 2, OT-IRF)  -- long term goal (LTG);by discharge  -DN     Progress/Outcomes (Bathing Goal 2, OT-IRF) goal not met  -DN goal ongoing  -DN        UB Dressing Goal 1 (OT-IRF)    Activity/Device (UB Dressing Goal 1, OT-IRF) upper body dressing  -DN upper body dressing  -DN     [place] (UB Dress Goal 1, OT-IRF)  -- supervision required  -DN     Time Frame (UB Dressing Goal 1, OT-IRF)  -- short term goal (STG)  -DN     Progress/Outcomes (UB Dressing Goal 1, OT-IRF) goal met  -DN goal met;goal ongoing  -DN        UB Dressing Goal 2 (OT-IRF)    Activity/Device (UB Dressing Goal 2, OT-IRF) upper body dressing  -DN upper body dressing  -DN     [place] (UB Dress Goal 2, OT-IRF)  -- supervision required  -DN     Time Frame (UB Dressing Goal 2, OT-IRF)  -- long term goal (LTG)  -DN     Progress/Outcomes (UB Dressing Goal 2, OT-IRF) goal met  -DN goal ongoing  -DN        LB Dressing Goal 1 (OT-IRF)    Activity/Device (LB Dressing Goal 1, OT-IRF) lower body dressing  -DN lower body dressing  -DN     [place] (LB Dressing Goal 1, OT-IRF)   -- contact guard assist;supervision required  -DN     Time Frame (LB Dressing Goal 1, OT-IRF)  -- short term goal (STG)  -DN     Progress/Outcomes (LB Dressing Goal 1, OT-IRF) goal met  -DN goal met;goal revised this date  -DN        LB Dressing Goal 2 (OT-IRF)    Activity/Device (LB Dressing Goal 2, OT-IRF) lower body dressing  -DN lower body dressing  -DN     [place] (LB Dressing Goal 2, OT-IRF)  -- supervision required  -DN     Time Frame (LB Dressing Goal 2, OT-IRF)  -- long term goal (LTG)  -DN     Progress/Outcomes (LB Dressing Goal 2, OT-IRF) goal met  -DN goal ongoing  -DN        Toileting Goal 1 (OT-IRF)    Activity/Device (Toileting Goal 1, OT-IRF) toileting skills, all;perform perineal hygiene;adjust/manage clothing;grab bar/safety frame  -DN toileting skills, all;perform perineal hygiene;adjust/manage clothing;grab bar/safety frame  -DN     [place] Level (Toileting Goal 1, OT-IRF)  -- contact guard assist  -DN     Time Frame (Toileting Goal 1, OT-IRF)  -- short term goal (STG)  -DN     Progress/Outcomes (Toileting Goal 1, OT-IRF) goal met  -DN goal met;goal revised this date  -DN        Toileting Goal 2 (OT-IRF)    Activity/Device (Toileting Goal 2, OT-IRF) toileting skills, all;adjust/manage clothing;perform perineal hygiene  -DN toileting skills, all;adjust/manage clothing;perform perineal hygiene  -DN     [place] Level (Toileting Goal 2, OT-IRF)  -- supervision required;verbal cues required  -DN     Time Frame (Toileting Goal 2, OT-IRF)  -- long term goal (LTG)  -DN     Progress/Outcomes (Toileting Goal 2, OT-IRF) goal met  -DN goal ongoing  -DN        Strength Goal 1 (OT-IRF)    Strength Goal 1 (OT-IRF) Pt to increase LUE strength to 4-/5 to assist with ADLs.  -DN Pt to increase LUE strength to 4-/5 to assist with ADLs.  -DN     Time Frame (Strength Goal 1, OT-IRF)  -- short term goal (STG);1 week  -DN     Progress/Outcomes (Strength Goal 1, OT-IRF) goal met  -DN goal met;goal revised  this date  -DN        Strength Goal 2 (OT-IRF)    Strength Goal 2 (OT-IRF)  -- Pt to increase LUE strength to 4/5 to assist with ADL.  -DN     Time Frame (Strength Goal 2, OT-IRF) long term goal (LTG);by discharge  -DN long term goal (LTG);by discharge  -DN     Progress/Outcomes (Strength Goal 2, OT-IRF) goal met  -DN goal ongoing  -DN        Balance Goal 1 (OT)    Activity/Assistive Device (Balance Goal 1, OT) standing, static;standing, dynamic  -DN standing, static;standing, dynamic  -DN     [place] Level/Cues Needed (Balance Goal 1, OT)  -- supervision required  -DN     Time Frame (Balance Goal 1, OT)  -- long term goal (LTG);by discharge  -DN     Progress/Outcomes (Balance Goal 1, OT) goal met  -DN goal ongoing  -DN        Caregiver Training Goal 1 (OT-IRF)    Caregiver Training Goal 1 (OT-IRF) Pt and family to be indep with HEP, AD/AE, ADLs, functional transfers and IADL tasks upon d/c isamar.  -DN Pt and family to be indep with HEP, AD/AE, ADLs, functional transfers and IADL tasks upon d/c isamar.  -DN     Time Frame (Caregiver Training Goal 1, OT-IRF)  -- long term goal (LTG);by discharge  -DN     Progress/Outcomes (Caregiver Training Goal 1, OT-IRF) goal met  -DN goal ongoing;goal not met  -DN       User Key  (r) = Recorded By, (t) = Taken By, (c) = Cosigned By    Initials Name Provider Type    GRACIELA Bocanegra Occupational Therapist          Occupational Therapy Education     Title: PT OT SLP Therapies (Done)     Topic: Occupational Therapy (Done)     Point: ADL training (Done)     Description: Instruct learner(s) on proper safety adaptation and remediation techniques during self care or transfers.   Instruct in proper use of assistive devices.   Learning Progress Summary     Learner Status Readiness Method Response Comment Documented by    Patient Done Acceptance E,TB VU pt issued HEP for LUE DN 07/02/18 1419     Done Acceptance E,TB VU,NR transfer tranning and adaptive adls DN 06/26/18 0910     Done  Acceptance E,TB VU,DU OT POC and goals; safety during shower transfer and ADLs, decreased balance and concern for falls. SO 06/16/18 1028    Family Done Acceptance E,TB VU,DU OT POC and goals; safety during shower transfer and ADLs, decreased balance and concern for falls. SO 06/16/18 1028          Point: Home exercise program (Done)     Description: Instruct learner(s) on appropriate technique for monitoring, assisting and/or progressing therapeutic exercises/activities.   Learning Progress Summary     Learner Status Readiness Method Response Comment Documented by    Patient Done Acceptance E,TB VU pt issued HEP for LUC[place]ERON DN 07/02/18 1419          Point: Precautions (Done)     Description: Instruct learner(s) on prescribed precautions during self-care and functional transfers.   Learning Progress Summary     Learner Status Readiness Method Response Comment Documented by    Patient Done Acceptance E,TB VU pt issued HEP for LUE DN 07/02/18 1419          Point: Body mechanics (Done)     Description: Instruct learner(s) on proper positioning and spine alignment during self-care, functional mobility activities and/or exercises.   Learning Progress Summary     Learner Status Readiness Method Response Comment Documented by    Patient Done Acceptance E,TB VU pt issued HEP for ELIANA DN 07/02/18 1419                      User Key     Initials Effective Dates Name Provider Type Discipline    DN 06/08/18 -  Trip Og, OTR Occupational Therapist OT    SO 04/13/15 -  Jessica Mata, OTR Occupational Therapist OT                  OT Recommendation and Plan  Anticipated Discharge Disposition (OT): home with assist                Outcome Measures     Row Name 07/02/18 1400             Modified [place] Scale    Modified [place] Scale 3 - Moderate disability.  Requiring some help, but able to walk without assistance.  -DN         Functional Assessment    Outcome Measure Options Modified Ryan  -DN        User Key  (r) =  Recorded By, (t) = Taken By, (c) = Cosigned By    Initials Name Provider Type    GRACIELA Bocanegra Occupational Therapist           Time Calculation:          Time Calculation- OT     Row Name 07/02/18 1421 07/02/18 1021          Time Calculation- OT    OT Start Time 1300  -DN 0830  -DN     OT Stop Time 1330  -DN 0900  -DN     OT Time Calculation (min) 30 min  -DN 30 min  -DN     OT Received On 07/02/18  -DN  --     OT Goal Re-Cert Due Date  -- 07/02/18  -DN       User Key  (r) = Recorded By, (t) = Taken By, (c) = Cosigned By    Initials Name Provider Type    GRACIELA Bocanegra Occupational Therapist          Therapy Suggested Charges     Code   Minutes Charges    None             Therapy Charges for Today     Code Description Service Date Service Provider Modifiers Qty    30912435544 HC OT SELF CARE/MGMT/TRAIN EA 15 MIN 7/2/2018 GRACIELA Garcia GO 2    34190343452 HC OT THER PROC EA 15 MIN 7/2/2018 GRACIELA Garcia GO 2               OT Discharge Summary  Anticipated Discharge Disposition (OT): home with assist  Reason for Discharge: Maximum functional potential achieved  Outcomes Achieved: Patient able to partially acheive established goals  Discharge Destination: Home with assist    GRACIELA Garcia  7/2/2018

## 2018-07-02 NOTE — PROGRESS NOTES
Inpatient Rehabilitation Plan of Care Note    Plan of Care  Care Plan Reviewed - No updates at this time.    Sphincter Control    Performed Intervention(s)  Encourage fluid intake    Signed by: Grayson Espinoza RN

## 2018-07-02 NOTE — PLAN OF CARE
Problem: Patient Care Overview  Goal: Plan of Care Review  Outcome: Ongoing (interventions implemented as appropriate)   07/02/18 1707   Patient Care Overview   IRF Plan of Care Review progress ongoing, continue   Progress, Functional Goals demonstrating adequate progress   OTHER   Outcome Summary Temp 101 this am, afebrile noon, 99.7 at 3. Feels worse when fever present. No unsafe behavior. Was able to do therapies. Following soft NTL diet, meds with as.   Coping/Psychosocial   Plan of Care Reviewed With patient       Problem: Stroke (Hemorrhagic) (Adult)  Goal: Signs and Symptoms of Listed Potential Problems Will be Absent, Minimized or Managed (Stroke)  Outcome: Ongoing (interventions implemented as appropriate)      Problem: Fall Risk (Adult)  Goal: Absence of Fall  Outcome: Ongoing (interventions implemented as appropriate)      Problem: Dysphagia (Adult)  Goal: Functional/Safe Swallow  Outcome: Ongoing (interventions implemented as appropriate)      Problem: Skin Injury Risk (Adult)  Goal: Skin Health and Integrity  Outcome: Ongoing (interventions implemented as appropriate)

## 2018-07-02 NOTE — THERAPY DISCHARGE NOTE
Inpatient Rehabilitation - IRF Speech Language Pathology   Swallow Treatment Note/Discharge   [facility]     Patient Name: Martín Souza  : 1940  MRN: [id number]  Today's Date: 2018               Admit Date: 6/15/2018   Discharge delayed due to continued fever. Pt to be seen for speech 7/3/18.   Visit Dx:      ICD-10-CM ICD-9-CM   1. Dysarthria R47.1 784.51     Patient Active Problem List   Diagnosis   • CVA (cerebral vascular accident) (CMS/[facility])       Therapy Treatment    Evaluation/Coping    Evaluation/Treatment Time and Intent  Subjective Information: complains of, weakness, fatigue (18 1400 : Katherine L Bruton)  Document Type: therapy note (daily note), discharge treatment (18 1400 : Katherine L Bruton)  Mode of Treatment: speech-language pathology, individual therapy (18 1400 : Katherine L Bruton)  Patient/Family Observations: patient seen at bedside d/t not feeling well and requested to stay in bed; demonstrated to pt how to mix nectar-thick liquids at home with powdered thickener (18 1400 : Katherine L Bruton)  Start Time (Evaluation/Treatment): 1400 (18 1400 : Katherine L Bruton)  Stop Time (Evaluation/Treatment): 1430 (18 1400 : Katherine L Bruton)    Vitals/Pain/Safety    Pain Scale: Numbers Pre/Post-Treatment  Pain Scale: Numbers, Pretreatment: 0/10 - no pain (18 1400 : Katherine L Bruton)  Pain Scale: Numbers, Post-Treatment: 0/10 - no pain (18 1400 : Cammie L Bruton)    Cognition/Communication         Oral Motor/Eating         Mobility/Basic Activities/Instrumental Activities/Motor/Modality                   ROM/MMT                   Sensory/Myotome/Dermatome/Edema               Posture/Balance/Special Tests/Exercise/Transportation/Sexual Function                   Orthotics/Residual Limb/Prosthetic Management              Outcome Summary               SLP GOALS     Row Name 18 1400 18 1000          Oral Nutrition/Hydration  Goal 1 (SLP)    Oral Nutrition/Hydration Goal 1, SLP Pt will tolerate recommended diet   -KB Pt will tolerate recommended diet   -KB     Barriers (Oral Nutrition/Hydration Goal 1, SLP) NMES for dysphagia tolerated at 6mA, swallows with boluses of TL, NTL completed with NMES; patient was given trials of thin liquid while using NMES for dysphagia; cough on TL noted x2 trials, reduced coughing with reminder to take small sips; tolerated NTL withous overt s/s of pen/asp  -KB patient tolerated trials of thin liquid throughout the session without overt s/s of pen/asp  -KB        Lingual Strengthening Goal 1 (SLP)    Activity (Lingual Strengthening Goal 1, SLP)  -- increase lingual tone/sensation/control/coordination/movement;increase buccal tension or tone;increase tongue back strength  -KB     Increase Lingual Tone/Sensation/Control/Coordination/Movement  -- lingual movement exercises;lingual resistance exercises  -KB     Increase Buccal Tension or Tone  -- MOST/swallow strong;swallow trials  -KB     Increase Tongue Back Strength  -- lingual movement exercises;swallow trials;lingual resistance exercises  -KB     [place]/Accuracy (Lingual Strengthening Goal 1, SLP)  -- with minimal cues (75-90% accuracy)  -KB     Progress/Outcomes (Lingual Strengthening Goal 1, SLP)  -- goal ongoing  -KB        Pharyngeal Strengthening Exercise Goal 2 (SLP)    Increase Timing shaker  -KB Mendelsohn;falsetto;hard effortful swallow;effortful pitch glide (falsetto + pharyngeal squeeze);chin tuck against resistance (CTAR);super-supraglottic swallow;joann  -KB     Increase Tongue Base Retraction shaker  -KB hard effortful swallow;joann  -KB        Swallow Compensatory Strategies Goal 1 (SLP)    Activity (Swallow Compensatory Strategies/Techniques Goal 1, SLP) small cup sips   verbal reminder  -KB small cup sips  -KB       User Key  (r) = Recorded By, (t) = Taken By, (c) = Cosigned By    Initials Name Provider Type    ANYA VIDAL  Bruton Speech and Language Pathologist          EDUCATION  The patient has been educated in the following areas:   Home Exercise Program (HEP) Dysphagia (Swallowing Impairment) Oral Care/Hydration Modified Diet Instruction.    SLP Recommendation and Plan                    Anticipated Dischage Disposition: home with assist, home with OP services                Plan of Care Reviewed With: patient  IRF Plan of Care Review: progress ongoing, continue, discharge pending  Progress, Functional Goals: progress more gradual than expected  Outcome Summary: Patient with ongoing mild dysarthria and mild oral-phryngeal dysphagia. He has not been compliant with exercise programs prescribed to him, and required encouragement to continue exercises in sessions. Intelligibility has improved, but needs more improvement. He is currently tolerating a mechanical soft diet with NTL. ST is recommended following d/c.       SLP Outcome Measures (last 72 hours)      SLP Outcome Measures     Row Name 07/02/18 1500             SLP Outcome Measures    Outcome Measure Used? Adult NOMS  -KB         FCM Scores    FCM Chosen Swallowing;Motor Speech  -KB      Swallowing FCM Score 4  -KB      Motor Speech FCM Score 5  -KB        User Key  (r) = Recorded By, (t) = Taken By, (c) = Cosigned By    Initials Name Effective Dates    KB Katherine L Bruton 03/07/18 -              Time Calculation:         Time Calculation- SLP     Row Name 07/02/18 1430 07/02/18 1030          Time Calculation- SLP    SLP Start Time 1400  -KB 1000  -KB     SLP Stop Time 1430  -KB 1030  -KB     SLP Time Calculation (min) 30 min  -KB 30 min  -KB       User Key  (r) = Recorded By, (t) = Taken By, (c) = Cosigned By    Initials Name Provider Type    KB Katherine L Bruton Speech and Language Pathologist          Therapy Charges for Today     Code Description Service Date Service Provider Modifiers Qty    80907878517  ST TREATMENT SWALLOW 4 7/2/2018 Katherine L Bruton GN 1                SLP Discharge Summary  Anticipated Dischage Disposition: home with assist, home with OP services  Reason for Discharge: discharge from this facility  Progress Toward Achieving Short/long Term Goals: goals not met within established timelines  Discharge Destination: home w/ assist, home w/ OP services, anticipated therapy at next level of care    Katherine L Bruton  7/2/2018

## 2018-07-02 NOTE — PROGRESS NOTES
Inpatient Rehabilitation Functional Measures Assessment    Functional Measures  DAVEY Eating:  Eating Score = 5. Patient is supervision/set-up for eating,  requiring: Verbal cuing, prompting, or instructing. Application of orthosis.  Stand by assistance. Application of assistive/adaptive devices. Stand by  assistance. Verbal cuing, prompting, or instructing. No assistive devices were  required.  DAVEY Grooming: [facility] Bathing:  [facility] Upper Body Dressing:  [facility] Lower Body Dressing:  [facility] Toileting:  Toileting Score = 7.  Patient is completely independent for  toileting. There are no activity limitations.    DAVEY Bladder Management  Level of Assistance:  Bladder Score = 7.  Patient is completely independent for  bladder management. There are no activity limitations.  Frequency/Number of Accidents this Shift:  Bladder accidents this shift:  2 .    DAVEY Bowel Management  Level of Assistance: Bowel Score = 4. Patient performs 75% or more of tasks and  requires minimal assistance for bowel management. [place] provides touching  (incidental) assist for application of brief.  Frequency/Number of Accidents this Shift: [facility] Bed/Chair/Wheelchair Transfer:  [facility] Toilet Transfer:  Toilet Transfer was not observed this shift because  patient used bedpan/urinal only.  DAVEY Tub/Shower Transfer:  [facility]    Previously Documented Mode of Locomotion at Discharge: Field  DAVEY Expected Mode of Locomotion at Discharge: [facility] Walk/Wheelchair:  [facility] Stairs:  [facility] Comprehension:  [facility] Expression:  [facility] Social Interaction:  [facility] Problem Solving:  [facility] Memory:  [facility]    Therapy Mode Minutes  Occupational Therapy: [facility]  Physical Therapy: [facility]  Speech Language Pathology:  [facility]    Signed by: Daphne Resendiz

## 2018-07-02 NOTE — SIGNIFICANT NOTE
07/02/18 1430   SLP Discharge Summary   Anticipated Dischage Disposition home with assist;home with OP services   Reason for Discharge discharge from this facility   Progress Toward Achieving Short/long Term Goals goals not met within established timelines   Discharge Destination home w/ assist;home w/ OP services;anticipated therapy at next level of care

## 2018-07-03 ENCOUNTER — APPOINTMENT (OUTPATIENT)
Dept: GENERAL RADIOLOGY | Facility: HOSPITAL | Age: 78
End: 2018-07-03
Attending: PHYSICAL MEDICINE & REHABILITATION

## 2018-07-03 LAB
ALBUMIN SERPL-MCNC: 3.3 G/DL (ref 3.5–5.2)
ALBUMIN/GLOB SERPL: 1 G/DL
ALP SERPL-CCNC: 166 U/L (ref 39–117)
ALT SERPL W P-5'-P-CCNC: 71 U/L (ref 1–41)
ANION GAP SERPL CALCULATED.3IONS-SCNC: 12.9 MMOL/L
AST SERPL-CCNC: 27 U/L (ref 1–40)
BACTERIA SPEC AEROBE CULT: NORMAL
BACTERIA SPEC AEROBE CULT: NORMAL
BACTERIA SPEC RESP CULT: NORMAL
BASOPHILS # BLD AUTO: 0.02 10*3/MM3 (ref 0–0.2)
BASOPHILS NFR BLD AUTO: 0.3 % (ref 0–1.5)
BILIRUB SERPL-MCNC: 0.5 MG/DL (ref 0.1–1.2)
BUN BLD-MCNC: 30 MG/DL (ref 8–23)
BUN/CREAT SERPL: 19 (ref 7–25)
CALCIUM SPEC-SCNC: 8.7 MG/DL (ref 8.6–10.5)
CHLORIDE SERPL-SCNC: 99 MMOL/L (ref 98–107)
CO2 SERPL-SCNC: 23.1 MMOL/L (ref 22–29)
CREAT BLD-MCNC: 1.58 MG/DL (ref 0.76–1.27)
DEPRECATED RDW RBC AUTO: 42 FL (ref 37–54)
EOSINOPHIL # BLD AUTO: 0.37 10*3/MM3 (ref 0–0.7)
EOSINOPHIL NFR BLD AUTO: 4.6 % (ref 0.3–6.2)
ERYTHROCYTE [DISTWIDTH] IN BLOOD BY AUTOMATED COUNT: 12.5 % (ref 11.5–14.5)
GFR SERPL CREATININE-BSD FRML MDRD: 43 ML/MIN/1.73
GLOBULIN UR ELPH-MCNC: 3.2 GM/DL
GLUCOSE BLD-MCNC: 153 MG/DL (ref 65–99)
GRAM STN SPEC: NORMAL
HCT VFR BLD AUTO: 39.7 % (ref 40.4–52.2)
HGB BLD-MCNC: 13.1 G/DL (ref 13.7–17.6)
IMM GRANULOCYTES # BLD: 0 10*3/MM3 (ref 0–0.03)
IMM GRANULOCYTES NFR BLD: 0 % (ref 0–0.5)
LYMPHOCYTES # BLD AUTO: 0.59 10*3/MM3 (ref 0.9–4.8)
LYMPHOCYTES NFR BLD AUTO: 7.4 % (ref 19.6–45.3)
MAGNESIUM SERPL-MCNC: 2.3 MG/DL (ref 1.6–2.4)
MCH RBC QN AUTO: 30.7 PG (ref 27–32.7)
MCHC RBC AUTO-ENTMCNC: 33 G/DL (ref 32.6–36.4)
MCV RBC AUTO: 93 FL (ref 79.8–96.2)
MONOCYTES # BLD AUTO: 0.7 10*3/MM3 (ref 0.2–1.2)
MONOCYTES NFR BLD AUTO: 8.8 % (ref 5–12)
NEUTROPHILS # BLD AUTO: 6.28 10*3/MM3 (ref 1.9–8.1)
NEUTROPHILS NFR BLD AUTO: 78.9 % (ref 42.7–76)
PLATELET # BLD AUTO: 179 10*3/MM3 (ref 140–500)
PMV BLD AUTO: 11.1 FL (ref 6–12)
POTASSIUM BLD-SCNC: 3.3 MMOL/L (ref 3.5–5.2)
PROT SERPL-MCNC: 6.5 G/DL (ref 6–8.5)
RBC # BLD AUTO: 4.27 10*6/MM3 (ref 4.6–6)
SODIUM BLD-SCNC: 135 MMOL/L (ref 136–145)
WBC NRBC COR # BLD: 7.96 10*3/MM3 (ref 4.5–10.7)

## 2018-07-03 PROCEDURE — 71046 X-RAY EXAM CHEST 2 VIEWS: CPT

## 2018-07-03 PROCEDURE — 85025 COMPLETE CBC W/AUTO DIFF WBC: CPT | Performed by: PHYSICAL MEDICINE & REHABILITATION

## 2018-07-03 PROCEDURE — 92526 ORAL FUNCTION THERAPY: CPT

## 2018-07-03 PROCEDURE — 83735 ASSAY OF MAGNESIUM: CPT | Performed by: PHYSICAL MEDICINE & REHABILITATION

## 2018-07-03 PROCEDURE — 97535 SELF CARE MNGMENT TRAINING: CPT

## 2018-07-03 PROCEDURE — 97110 THERAPEUTIC EXERCISES: CPT

## 2018-07-03 PROCEDURE — 93010 ELECTROCARDIOGRAM REPORT: CPT | Performed by: INTERNAL MEDICINE

## 2018-07-03 PROCEDURE — 80053 COMPREHEN METABOLIC PANEL: CPT | Performed by: PHYSICAL MEDICINE & REHABILITATION

## 2018-07-03 PROCEDURE — 93005 ELECTROCARDIOGRAM TRACING: CPT | Performed by: PHYSICAL MEDICINE & REHABILITATION

## 2018-07-03 RX ORDER — POTASSIUM CHLORIDE 750 MG/1
20 CAPSULE, EXTENDED RELEASE ORAL ONCE
Status: COMPLETED | OUTPATIENT
Start: 2018-07-03 | End: 2018-07-03

## 2018-07-03 RX ADMIN — DABIGATRAN ETEXILATE MESYLATE 150 MG: 150 CAPSULE ORAL at 09:00

## 2018-07-03 RX ADMIN — METOPROLOL TARTRATE 25 MG: 25 TABLET ORAL at 20:16

## 2018-07-03 RX ADMIN — POTASSIUM CHLORIDE 20 MEQ: 750 CAPSULE, EXTENDED RELEASE ORAL at 17:50

## 2018-07-03 RX ADMIN — LISINOPRIL 20 MG: 20 TABLET ORAL at 20:16

## 2018-07-03 RX ADMIN — AMLODIPINE BESYLATE 10 MG: 10 TABLET ORAL at 09:01

## 2018-07-03 RX ADMIN — HYDRALAZINE HYDROCHLORIDE 50 MG: 50 TABLET, FILM COATED ORAL at 21:10

## 2018-07-03 RX ADMIN — PANTOPRAZOLE SODIUM 40 MG: 40 TABLET, DELAYED RELEASE ORAL at 05:58

## 2018-07-03 RX ADMIN — HYDRALAZINE HYDROCHLORIDE 50 MG: 50 TABLET, FILM COATED ORAL at 15:14

## 2018-07-03 RX ADMIN — LEVOTHYROXINE SODIUM 112 MCG: 112 TABLET ORAL at 05:58

## 2018-07-03 RX ADMIN — ACETAMINOPHEN 650 MG: 325 TABLET, FILM COATED ORAL at 12:20

## 2018-07-03 RX ADMIN — PAROXETINE HYDROCHLORIDE 10 MG: 10 TABLET, FILM COATED ORAL at 09:00

## 2018-07-03 RX ADMIN — HYDRALAZINE HYDROCHLORIDE 50 MG: 50 TABLET, FILM COATED ORAL at 05:58

## 2018-07-03 RX ADMIN — METOPROLOL TARTRATE 25 MG: 25 TABLET ORAL at 09:01

## 2018-07-03 RX ADMIN — DABIGATRAN ETEXILATE MESYLATE 150 MG: 150 CAPSULE ORAL at 20:16

## 2018-07-03 RX ADMIN — LISINOPRIL 20 MG: 20 TABLET ORAL at 09:00

## 2018-07-03 RX ADMIN — CETIRIZINE HYDROCHLORIDE 10 MG: 10 TABLET, FILM COATED ORAL at 09:01

## 2018-07-03 RX ADMIN — ATORVASTATIN CALCIUM 40 MG: 20 TABLET, FILM COATED ORAL at 20:16

## 2018-07-03 RX ADMIN — Medication 1 TABLET: at 09:01

## 2018-07-03 NOTE — PROGRESS NOTES
Met with patient and wife to discuss postponed d/c today and outpatient therapy schedule. Patient will d/c home with wife when medically ready to go home per MD. Patient is scheduled to start outpatient PT and ST here at [facility] on Monday, 7/9 at 9:45 a.m.

## 2018-07-03 NOTE — SIGNIFICANT NOTE
07/03/18 1400   Rehab Treatment   Reason Treatment Not Performed unavailable for treatment  (off floor for chest x-ray)

## 2018-07-03 NOTE — THERAPY TREATMENT NOTE
Inpatient Rehabilitation - Physical Therapy Treatment Note  [facility]     Patient Name: Martín Souza  : 1940  MRN: [id number]    Today's Date: 7/3/2018                 Admit Date: 6/15/2018      Visit Dx:      ICD-10-CM ICD-9-CM   1. Dysarthria R47.1 784.51       Patient Active Problem List   Diagnosis   • CVA (cerebral vascular accident) (CMS/HCC)       Therapy Treatment          IRF Treatment Summary     Row Name 18 1510 18 1000 18 0913       Evaluation/Treatment Time and Intent    Subjective Information complains of;weakness  -DN complains of;fatigue  -KB complains of;weakness;pain   feeling better today  -LB    Document Type therapy note (daily note)  -DN therapy note (daily note)  -KB therapy note (daily note)  -LB    Mode of Treatment occupational therapy  -DN speech-language pathology;individual therapy  -KB physical therapy  -LB    Patient/Family Observations  -- seated in wc in his room; agreeable to therapy with encouragement  -KB seated in w/c  -LB    Start Time (Evaluation/Treatment)  -- 1000  -KB  --    Stop Time (Evaluation/Treatment)  -- 1030  -KB  --    Recorded by [DN] GRACIELA Garcia [KB] Katherine L Bruton [LB] Chey Owens, ERIKA    Row Name 18 0904             Evaluation/Treatment Time and Intent    Subjective Information weakness;fatigue  -DN      Document Type therapy note (daily note)  -DN      Mode of Treatment occupational therapy  -DN      Patient/Family Observations Addendum to d/c summary of yesterday, pt still has fever and will stay in Rehab till acceptable temp to go home, will revise goals  -DN      Recorded by [DN] GRACIELA Garcia      Row Name 18 0904             Family/Support System    Health Advocacy (Family/Support System) caregiver advocates for patient's health  -DN      Recorded by [DN] GRACIELA Garcia      Row Name 18 1510 18 0904          Cognition/Psychosocial- PT/OT    Affect/Mental Status (Cognitive)  WFL  -DN WFL  -DN     Orientation Status (Cognition)  -- oriented x 4  -DN     Follows Commands (Cognition)  -- follows one step commands;over 90% accuracy  -DN     Personal Safety Interventions  -- fall prevention program maintained;gait belt  -DN     Cognitive Function (Cognitive)  -- safety deficit  -DN     Safety Deficit (Cognitive)  -- mild deficit;severe deficit;awareness of need for assistance  -DN     Recorded by [DN] GRACIELA Garcia [DN] Trip Og OTR     Row Name 07/03/18 0913             Bed Mobility Assessment/Treatment    Supine-Sit [place] (Bed Mobility) conditional independence  -LB      Assistive Device (Bed Mobility) bed rails;head of bed elevated  -LB      Recorded by [LB] Chey Owens [facility]      Row Name 07/03/18 0913             Bed-Chair Transfer    Bed-Chair [place] (Transfers) stand by assist  -LB      Recorded by [LB] Chey Owens [facility]      Row Name 07/03/18 0913             Sit-Stand Transfer    Sit-Stand [place] (Transfers) stand by assist;verbal cues  -LB      Assistive Device (Sit-Stand Transfers) walker, front-wheeled  -LB      Recorded by [LB] Chey Owens [facility]      Row Name 07/03/18 0913             Stand-Sit Transfer    Stand-Sit [place] (Transfers) stand by assist;verbal cues  -LB      Assistive Device (Stand-Sit Transfers) walker, front-wheeled  -LB      Recorded by [LB] Chey Owens [facility]      Row Name 07/03/18 0913             Car Transfer    Type (Car Transfer) sit-stand;stand-sit  -LB      [place] Level (Car Transfer) contact guard;stand by assist;verbal cues  -LB      Assistive Device (Car Transfer) walker, front-wheeled  -LB      Recorded by [LB] Chey Owens [facility]      Row Name 07/03/18 0913             Gait/Stairs Assessment/Training    [place] Level (Gait) contact guard;stand by assist  -LB      Assistive Device (Gait) walker, front-wheeled  -LB      Distance in Feet (Gait) 260  -LB      Deviations/Abnormal  Patterns (Gait) base of support, narrow  -LB      Bilateral Gait Deviations forward flexed posture  -LB      Left Sided Gait Deviations heel strike decreased  -LB      [place] Level (Stairs) contact guard   in PM 1 slight LOB  -LB      Handrail Location (Stairs) right side (ascending)  -LB      Number of Steps (Stairs) 8  -LB      Ascending Technique (Stairs) step-over-step  -LB      Descending Technique (Stairs) step-over-step  -LB      Recorded by [LB] Chey Owens PTA      Row Name 07/03/18 0913             Curb Negotiation (Mobility)    [place], Curb Negotiation contact guard  -LB      Comment, Curb Negotiation (Mobility) Rwx  -LB      Recorded by [LB] Chey Owens PTA      Row Name 07/03/18 0904             Bathing Assessment/Treatment    Bathing [place] Level bathing skills;lower body;upper body;supervision  -DN      Assistive Device (Bathing) shower chair;grab bar/tub rail  -DN      Bathing Position supported sitting;supported standing  -DN      Recorded by [DN] GRACIELA Garcia      Row Name 07/03/18 0904             Upper Body Dressing Assessment/Treatment    Upper Body Dressing Task upper body dressing skills;doff;don;pull over garment;set up assistance  -DN      Upper Body Dressing Position supported sitting  -DN      Set-up Assistance (Upper Body Dressing) obtain clothing  -DN      Recorded by [DN] GRACIELA Garcia      Row Name 07/03/18 0904             Lower Body Dressing Assessment/Treatment    Lower Body Dressing [place] Level doff;don;pants/bottoms;shoes/slippers;socks;underwear;contact guard assist  -DN      Lower Body Dressing Position supported sitting;supported standing  -DN      Lower Body Dressing Setup Assistance obtain clothing  -DN      Comment (Lower Body Dressing) pt is weak from illness now needs CGA for static standing due to weakness  -DN      Recorded by [DN] GRACIELA Garcia      Row Name 07/03/18 0904             Grooming Assessment/Treatment     Grooming [place] Level grooming skills;deodorant application;hair care, combing/brushing;set up  -DN      Grooming Position sink side;supported sitting  -DN      Recorded by [DN] GRACIELA Garcia      Row Name 07/03/18 0904             Toileting Assessment/Treatment    Toileting [place] Level toileting skills;adjust/manage clothing;perform perineal hygiene;contact guard assist  -DN      Assistive Device Use (Toileting) grab bar/safety frame;raised toilet seat  -DN      Toileting Position supported standing  -DN      Recorded by [DN] GRACIELA Garcia      Row Name 07/03/18 1510             Fine Motor Testing & Training    Comment, Fine Motor Coordination pt worked on small irregular peg placement in irregular hole, picking pegs out ot theraputty, and theraputty exercises with LUE, noted increased drops in pegs compared to last week and pt states he notices a decrease in strength  -DN      Recorded by [DN] GRACIELA Garcia      Row Name 07/03/18 1510 07/03/18 1000 07/03/18 0913       Pain Scale: Numbers Pre/Post-Treatment    Pain Scale: Numbers, Pretreatment 0/10 - no pain  -DN 0/10 - no pain  -KB 2/10  -LB    Pain Scale: Numbers, Post-Treatment 0/10 - no pain  -DN  --  --    Pain Location  --  -- head   reports that he was premedicated  -LB    Recorded by [ELIAS] GRACIELA Garcia [KB] Katherine L Bruton [LB] Chey Owens, \[facility] [place] [facility]\""    Row Name 07/03/18 0904             Pain Scale: Numbers Pre/Post-Treatment    Pain Scale: Numbers, Pretreatment 0/10 - no pain  -DN      Pain Scale: Numbers, Post-Treatment 0/10 - no pain  -DN      Recorded by [DN] GRACIELA Garcia      Row Name 07/03/18 0913             Lower Extremity Standing Therapeutic Exercise    Performed, Standing Lower Extremity (Therapeutic Exercise) mini-squats;heel raises  -LB      Device, Standing Lower Extremity (Therapeutic Exercise) parallel bars  -LB      Exercise Type, Standing Lower Extremity (Therapeutic Exercise) AROM (active range of  motion)  -LB      Sets/Reps Detail, Standing Lower Extremity (Therapeutic Exercise) 1/15  -LB      Recorded by [LB] Chey Owens PTA      Row Name 07/03/18 1510 07/03/18 0913 07/03/18 0904       Positioning and Restraints    Pre-Treatment Position --   w/c  -DN  -- in bed  -DN    Post Treatment Position wheelchair  -DN wheelchair  -LB wheelchair  -DN    In Bed sitting;call light within reach;encouraged to call for assist  -DN  -- sitting;with nsg  -DN    In Wheelchair  -- sitting;call light within reach  -LB  --    Recorded by [DN] GRACIELA Garcia [LB] Chey Owens PTA [DN] GRACIELA Garcia    Row Name 07/03/18 0904             Daily Summary of Progress (OT)    Functional Goal Overall Progress: Occupational Therapy progressing towards functional goals slower than expected  -DN      Impairments Continuing to Limit Function: Occupational Therapy pt is weak from illness which had halted d/c today and has made pt unstable at times due to overall weakness  -DN      Recommendations: Occupational Therapy will alter goals for d,c home when appropriate  -DN      Recorded by [ELIAS] GRACIELA Garcia        User Key  (r) = Recorded By, (t) = Taken By, (c) = Cosigned By    Initials Name Effective Dates    GRACIELA Bocanegra 06/08/18 -     RIKKI Owens PTA 03/07/18 -     KB Katherine L Bruton 03/07/18 -                Physical Therapy Education     Title: PT OT SLP Therapies (Done)     Topic: Physical Therapy (Done)     Point: Mobility training (Done)    Learning Progress Summary     Learner Status Readiness Method Response Comment Documented by    Patient Done Acceptance E VU,NR  LB 07/03/18 1536     Done Acceptance E,H DU  LB 07/02/18 1547     Done Acceptance E VU,NR Reinforced need for AD.  Pt denies concerns re pass tomorrow. KP 06/30/18 1332     Done Acceptance E,D VU,DU curb, car, gait, stairs LB 06/28/18 0915     Done Acceptance E,D VU,NR  LB 06/27/18 1433     Done Eager E,TB VU,NR   06/26/18  1104     Done Acceptance E VU,NR car, stairs, curb LB 06/25/18 1506     Done Acceptance E VU,NR  LB 06/23/18 1310     Done Acceptance E VU,NR curb, stairs LB 06/22/18 0927     Done Eager E VU,DU,NR   06/21/18 0913     Done Acceptance E VU,NR  LB 06/19/18 1557     Done Acceptance E,TB VU,NR transfers, gait, stairs LB 06/18/18 1521    Family Done Acceptance E,H DU  LB 07/02/18 1547    Significant Other Done Acceptance E VU,DU curb, ramp, gait LB 06/29/18 0931     Done Acceptance E,D VU,DU curb, car, gait, stairs LB 06/28/18 0915     Done Acceptance E,D VU,DU gait, car tsf LB 06/20/18 1443          Point: Home exercise program (Done)    Learning Progress Summary     Learner Status Readiness Method Response Comment Documented by    Patient Done Acceptance E,H DU  LB 07/02/18 1547     Done Acceptance E VU,NR Reinforced need for AD.  Pt denies concerns re pass tomorrow.  06/30/18 1332     Done Eager E,TB VU,NR  EE 06/26/18 1104     Done Eager E VU,DU,NR   06/21/18 0913    Family Done Acceptance E,H DU  LB 07/02/18 1547          Point: Body mechanics (Done)    Learning Progress Summary     Learner Status Readiness Method Response Comment Documented by    Patient Done Acceptance E,H DU  LB 07/02/18 1547     Done Acceptance E,D VU,DU curb, car, gait, stairs LB 06/28/18 0915     Done Eager E,TB VU,NR  EE 06/26/18 1104     Active Acceptance D NR  CC 06/21/18 0023     Done Acceptance E VU,NR  LB 06/19/18 1557    Family Done Acceptance E,H DU  LB 07/02/18 1547    Significant Other Done Acceptance E,D VU,DU curb, car, gait, stairs LB 06/28/18 0915          Point: Precautions (Done)    Learning Progress Summary     Learner Status Readiness Method Response Comment Documented by    Patient Done Acceptance E,H DU  LB 07/02/18 1547     Done Acceptance E VU,NR Reinforced need for AD.  Pt denies concerns re pass tomorrow.  06/30/18 1332     Done Eager E,TB GLADYS,NR  JOSE 06/26/18 1104     Done Acceptance E GLADYS AMECZUA 06/16/18 1056     Family Done Acceptance E,H DU  LB 07/02/18 1547                      User Key     Initials Effective Dates Name Provider Type Discipline    LB 03/07/18 -  Chey Owens PTA Physical Therapy Assistant PT    EE 04/03/18 -  Daphne Manning, PT Physical Therapist PT    MD 04/03/18 -  Lupis Calderon, PT Physical Therapist PT    KP 04/03/18 -  Erin Ng, PT Physical Therapist PT    CC 01/17/18 -  Mei Young, RN Registered Nurse Nurse                  PT Recommendation and Plan                               Outcome Measures     Row Name 07/02/18 1400             Modified Fontana Scale    Modified Fontana Scale 3 - Moderate disability.  Requiring some help, but able to walk without assistance.  -DN         Functional Assessment    Outcome Measure Options Modified Fontana  -DN        User Key  (r) = Recorded By, (t) = Taken By, (c) = Cosigned By    Initials Name Provider Type    ELIAS Og, OTR Occupational Therapist             Time Calculation:           PT Charges     Row Name 07/03/18 1535 07/03/18 0913          Time Calculation    Start Time 1230  -LB 0900  -LB     Stop Time 1300  -LB 0930  -LB     Time Calculation (min) 30 min  -LB 30 min  -LB       User Key  (r) = Recorded By, (t) = Taken By, (c) = Cosigned By    Initials Name Provider Type     Chey Owens PTA Physical Therapy Assistant            Therapy Charges for Today     Code Description Service Date Service Provider Modifiers Qty    94749183658 HC PT THER PROC EA 15 MIN 7/2/2018 Chey Owens PTA GP 4    59908911487 HC PT THER PROC EA 15 MIN 7/3/2018 Chey Owens PTA GP 4            PT G-Codes  Outcome Measure Options: Boo Owens PTA  7/3/2018

## 2018-07-03 NOTE — PROGRESS NOTES
Occupational Therapy: [facility]    Physical Therapy: [facility]    Speech Language Pathology:  Individual: 30 minutes.    Signed by: Katherine Bruton, SLP

## 2018-07-03 NOTE — THERAPY TREATMENT NOTE
Inpatient Rehabilitation - Occupational Therapy Treatment Note    [facility]     Patient Name: Martín Souza  : 1940  MRN: [id number]    Today's Date: 7/3/2018                 Admit Date: 6/15/2018      Visit Dx:    ICD-10-CM ICD-9-CM   1. Dysarthria R47.1 784.51       Patient Active Problem List   Diagnosis   • CVA (cerebral vascular accident) (CMS/HCC)         Therapy Treatment          IRF Treatment Summary     Row Name 18 1510 18 1000 18 0913       Evaluation/Treatment Time and Intent    Subjective Information complains of;weakness  -DN complains of;fatigue  -KB complains of;weakness;pain   feeling better today  -LB    Document Type therapy note (daily note)  -DN therapy note (daily note)  -KB therapy note (daily note)  -LB    Mode of Treatment occupational therapy  -DN speech-language pathology;individual therapy  -KB physical therapy  -LB    Patient/Family Observations  -- seated in wc in his room; agreeable to therapy with encouragement  -KB seated in w/c  -LB    Start Time (Evaluation/Treatment)  -- 1000  -KB  --    Stop Time (Evaluation/Treatment)  -- 1030  -KB  --    Recorded by [DN] GRACIELA Garcia [KB] Katherine L Bruton [LB] Chey Owens, ERIKA    Row Name 18 0904             Evaluation/Treatment Time and Intent    Subjective Information weakness;fatigue  -DN      Document Type therapy note (daily note)  -DN      Mode of Treatment occupational therapy  -DN      Patient/Family Observations Addendum to d/c summary of yesterday, pt still has fever and will stay in Rehab till acceptable temp to go home, will revise goals  -DN      Recorded by [DN] GRACIELA Garcia      Row Name 18 0904             Family/Support System    Health Advocacy (Family/Support System) caregiver advocates for patient's health  -DN      Recorded by [DN] GRACIELA Garcia      Row Name 18 1510 18 0904          Cognition/Psychosocial- PT/OT    Affect/Mental Status  (Cognitive) WFL  -DN WFL  -DN     Orientation Status (Cognition)  -- oriented x 4  -DN     Follows Commands (Cognition)  -- follows one step commands;over 90% accuracy  -DN     Personal Safety Interventions  -- fall prevention program maintained;gait belt  -DN     Cognitive Function (Cognitive)  -- safety deficit  -DN     Safety Deficit (Cognitive)  -- mild deficit;severe deficit;awareness of need for assistance  -DN     Recorded by [DN] Trip Og OTR [DN] Trip Og OTR     Row Name 07/03/18 0913             Bed Mobility Assessment/Treatment    Supine-Sit [place] (Bed Mobility) conditional independence  -LB      Assistive Device (Bed Mobility) bed rails;head of bed elevated  -LB      Recorded by [LB] Chey Owens [facility]      Row Name 07/03/18 0913             Bed-Chair Transfer    Bed-Chair [place] (Transfers) stand by assist  -LB      Recorded by [LB] Chey Owens [facility]      Row Name 07/03/18 0913             Sit-Stand Transfer    Sit-Stand [place] (Transfers) stand by assist;verbal cues  -LB      Assistive Device (Sit-Stand Transfers) walker, front-wheeled  -LB      Recorded by [LB] Chey Owens [facility]      Row Name 07/03/18 0913             Stand-Sit Transfer    Stand-Sit [place] (Transfers) stand by assist;verbal cues  -LB      Assistive Device (Stand-Sit Transfers) walker, front-wheeled  -LB      Recorded by [LB] Chey Owens [facility]      Row Name 07/03/18 0913             Car Transfer    Type (Car Transfer) sit-stand;stand-sit  -LB      [place] Level (Car Transfer) contact guard;stand by assist;verbal cues  -LB      Assistive Device (Car Transfer) walker, front-wheeled  -LB      Recorded by [LB] Chey Owens [facility]      Row Name 07/03/18 0913             Gait/Stairs Assessment/Training    [place] Level (Gait) contact guard;stand by assist  -LB      Assistive Device (Gait) walker, front-wheeled  -LB      Distance in Feet (Gait) 260  -LB       Deviations/Abnormal Patterns (Gait) base of support, narrow  -LB      Bilateral Gait Deviations forward flexed posture  -LB      Left Sided Gait Deviations heel strike decreased  -LB      [place] Level (Stairs) contact guard   in PM 1 slight LOB  -LB      Handrail Location (Stairs) right side (ascending)  -LB      Number of Steps (Stairs) 8  -LB      Ascending Technique (Stairs) step-over-step  -LB      Descending Technique (Stairs) step-over-step  -LB      Recorded by [LB] Chey Owens PTA      Row Name 07/03/18 0913             Curb Negotiation (Mobility)    [place], Curb Negotiation contact guard  -LB      Comment, Curb Negotiation (Mobility) Rwx  -LB      Recorded by [LB] Chey Owens PTA      Row Name 07/03/18 0904             Bathing Assessment/Treatment    Bathing [place] Level bathing skills;lower body;upper body;supervision  -DN      Assistive Device (Bathing) shower chair;grab bar/tub rail  -DN      Bathing Position supported sitting;supported standing  -DN      Recorded by [DN] GRACIELA Garcia      Row Name 07/03/18 0904             Upper Body Dressing Assessment/Treatment    Upper Body Dressing Task upper body dressing skills;doff;don;pull over garment;set up assistance  -DN      Upper Body Dressing Position supported sitting  -DN      Set-up Assistance (Upper Body Dressing) obtain clothing  -DN      Recorded by [DN] GRACIELA Garcia      Row Name 07/03/18 0904             Lower Body Dressing Assessment/Treatment    Lower Body Dressing [place] Level doff;don;pants/bottoms;shoes/slippers;socks;underwear;contact guard assist  -DN      Lower Body Dressing Position supported sitting;supported standing  -DN      Lower Body Dressing Setup Assistance obtain clothing  -DN      Comment (Lower Body Dressing) pt is weak from illness now needs CGA for static standing due to weakness  -DN      Recorded by [DN] GRACIELA Garcia      Row Name 07/03/18 0904             Grooming  Assessment/Treatment    Grooming [place] Level grooming skills;deodorant application;hair care, combing/brushing;set up  -DN      Grooming Position sink side;supported sitting  -DN      Recorded by [DN] GRACIELA Garcia      Row Name 07/03/18 0904             Toileting Assessment/Treatment    Toileting [place] Level toileting skills;adjust/manage clothing;perform perineal hygiene;contact guard assist  -DN      Assistive Device Use (Toileting) grab bar/safety frame;raised toilet seat  -DN      Toileting Position supported standing  -DN      Recorded by [DN] GRACIELA Garcia      Row Name 07/03/18 1510             Fine Motor Testing & Training    Comment, Fine Motor Coordination pt worked on small irregular peg placement in irregular hole, picking pegs out ot theraputty, and theraputty exercises with LUE, noted increased drops in pegs compared to last week and pt states he notices a decrease in strength  -DN      Recorded by [ELIAS] GRACIELA Garcia      Row Name 07/03/18 1510 07/03/18 1000 07/03/18 0913       Pain Scale: Numbers Pre/Post-Treatment    Pain Scale: Numbers, Pretreatment 0/10 - no pain  -DN 0/10 - no pain  -KB 2/10  -LB    Pain Scale: Numbers, Post-Treatment 0/10 - no pain  -DN  --  --    Pain Location  --  -- head   reports that he was premedicated  -LB    Recorded by [ELIAS] GRACIELA Garcia [KB] Katherine L Bruton [LB] Chey Owens, [facility]    Row Name 07/03/18 0904             Pain Scale: Numbers Pre/Post-Treatment    Pain Scale: Numbers, Pretreatment 0/10 - no pain  -DN      Pain Scale: Numbers, Post-Treatment 0/10 - no pain  -DN      Recorded by [DN] GRACIELA Garcia      Row Name 07/03/18 0913             Lower Extremity Standing Therapeutic Exercise    Performed, Standing Lower Extremity (Therapeutic Exercise) mini-squats;heel raises  -LB      Device, Standing Lower Extremity (Therapeutic Exercise) parallel bars  -LB      Exercise Type, Standing Lower Extremity (Therapeutic  Exercise) AROM (active range of motion)  -LB      Sets/Reps Detail, Standing Lower Extremity (Therapeutic Exercise) 1/15  -LB      Recorded by [LB] Chey Owens PTA      Row Name 07/03/18 1510 07/03/18 0913 07/03/18 0904       Positioning and Restraints    Pre-Treatment Position --   w/c  -DN  -- in bed  -DN    Post Treatment Position wheelchair  -DN wheelchair  -LB wheelchair  -DN    In Bed sitting;call light within reach;encouraged to call for assist  -DN  -- sitting;with nsg  -DN    In Wheelchair  -- sitting;call light within reach  -LB  --    Recorded by [ELIAS] GRACIELA Garcia [LB] Chey Owens PTA [DN] GRACIELA Garcia    Row Name 07/03/18 0904             Daily Summary of Progress (OT)    Functional Goal Overall Progress: Occupational Therapy progressing towards functional goals slower than expected  -DN      Impairments Continuing to Limit Function: Occupational Therapy pt is weak from illness which had halted d/c today and has made pt unstable at times due to overall weakness  -DN      Recommendations: Occupational Therapy will alter goals for d,c home when appropriate  -DN      Recorded by [DN] GRACIELA Garcia        User Key  (r) = Recorded By, (t) = Taken By, (c) = Cosigned By    Initials Name Effective Dates    GRACIELA Bocanegra 06/08/18 -     RIKKI Owens PTA 03/07/18 -     KB Katherine L Bruton 03/07/18 -                  OT Recommendation and Plan    Anticipated Discharge Disposition (OT): home with assist       Daily Summary of Progress (OT)  Functional Goal Overall Progress: Occupational Therapy: progressing towards functional goals slower than expected  Impairments Continuing to Limit Function: Occupational Therapy: pt is weak from illness which had halted d/c today and has made pt unstable at times due to overall weakness  Recommendations: Occupational Therapy: will alter goals for d,c home when appropriate          OT IRF GOALS     Row Name 07/03/18 0911 07/02/18  1400 06/25/18 1200       Transfer Goal 1 (OT-IRF)    Activity/Assistive Device (Transfer Goal 1, OT-IRF) toilet;shower chair;walk-in shower;walker, rolling  -DN toilet;shower chair;walk-in shower;walker, rolling  -DN toilet;shower chair;walk-in shower;walker, rolling  -DN    [place] Level (Transfer Goal 1, OT-IRF) supervision required;verbal cues required  -DN supervision required;verbal cues required  -DN supervision required;verbal cues required  -DN    Time Frame (Transfer Goal 1, OT-IRF) short term goal (STG)  -DN  -- short term goal (STG)  -DN    Barriers (Transfer Goal 1, OT-IRF)  -- pt ill last couple of days   -DN  --    Progress/Outcomes (Transfer Goal 1, OT-IRF) medical status inhibiting progress;goal ongoing  -DN goal not met  -DN good progress toward goal;goal revised this date;goal ongoing  -DN       Transfer Goal 2 (OT-IRF)    Activity/Assistive Device (Transfer Goal 2, OT-IRF) toilet;shower chair;walk-in shower;walker, rolling  -DN toilet;shower chair;walk-in shower;walker, rolling  -DN toilet;shower chair;walk-in shower;walker, rolling  -DN    [place] Level (Transfer Goal 2, OT-IRF) supervision required;verbal cues required  -DN supervision required;verbal cues required  -DN supervision required;verbal cues required  -DN    Time Frame (Transfer Goal 2, OT-IRF) long term goal (LTG)  -DN  -- long term goal (LTG)  -DN    Progress/Outcomes (Transfer Goal 2, OT-IRF) medical status inhibiting progress;goal ongoing  -DN goal not met  -DN goal revised this date;goal ongoing  -DN       Bathing Goal 1 (OT-IRF)    Activity/Device (Bathing Goal 1, OT-IRF) bathing skills, all;grab bar/tub rail;hand-held shower spray hose;shower chair  -DN bathing skills, all;grab bar/tub rail;hand-held shower spray hose;shower chair  -DN bathing skills, all;grab bar/tub rail;hand-held shower spray hose;shower chair  -DN    [place] Level (Bathing Goal 1, OT-IRF) supervision required;verbal cues required  -DN  --  supervision required;verbal cues required  -DN    Time Frame (Bathing Goal 1, OT-IRF) short term goal (STG)  -DN  -- short term goal (STG)  -DN    Progress/Outcomes (Bathing Goal 1, OT-IRF) goal met  -DN goal met;goal revised this date  -DN goal met;goal revised this date  -DN       Bathing Goal 2 (OT-IRF)    Activity/Device (Bathing Goal 2, OT-IRF)  -- bathing skills, all;grab bar/tub rail;hand-held shower spray hose;shower chair  -DN bathing skills, all;grab bar/tub rail;hand-held shower spray hose;shower chair  -DN    [place] Level (Bathing Goal 2, OT-IRF) supervision required  -DN  -- supervision required  -DN    Time Frame (Bathing Goal 2, OT-IRF) long term goal (LTG);by discharge  -DN  -- long term goal (LTG);by discharge  -DN    Progress/Outcomes (Bathing Goal 2, OT-IRF) goal met  -DN goal not met  -DN goal ongoing  -DN       UB Dressing Goal 1 (OT-IRF)    Activity/Device (UB Dressing Goal 1, OT-IRF) upper body dressing  -DN upper body dressing  -DN upper body dressing  -DN    [place] (UB Dress Goal 1, OT-IRF) supervision required  -DN  -- supervision required  -DN    Time Frame (UB Dressing Goal 1, OT-IRF) short term goal (STG)  -DN  -- short term goal (STG)  -DN    Progress/Outcomes (UB Dressing Goal 1, OT-IRF) goal met  -DN goal met  -DN goal met;goal ongoing  -DN       UB Dressing Goal 2 (OT-IRF)    Activity/Device (UB Dressing Goal 2, OT-IRF) upper body dressing  -DN upper body dressing  -DN upper body dressing  -DN    [place] (UB Dress Goal 2, OT-IRF) supervision required  -DN  -- supervision required  -DN    Time Frame (UB Dressing Goal 2, OT-IRF) long term goal (LTG)  -DN  -- long term goal (LTG)  -DN    Progress/Outcomes (UB Dressing Goal 2, OT-IRF) goal met  -DN goal met  -DN goal ongoing  -DN       LB Dressing Goal 1 (OT-IRF)    Activity/Device (LB Dressing Goal 1, OT-IRF) lower body dressing  -DN lower body dressing  -DN lower body dressing  -DN    [place] (LB Dressing Goal 1,  OT-IRF) supervision required  -DN  -- contact guard assist;supervision required  -DN    Time Frame (LB Dressing Goal 1, OT-IRF) short term goal (STG)  -DN  -- short term goal (STG)  -DN    Progress/Outcomes (LB Dressing Goal 1, OT-IRF) medical status inhibiting progress;goal ongoing  -DN goal met  -DN goal met;goal revised this date  -DN       LB Dressing Goal 2 (OT-IRF)    Activity/Device (LB Dressing Goal 2, OT-IRF) lower body dressing  -DN lower body dressing  -DN lower body dressing  -DN    [place] (LB Dressing Goal 2, OT-IRF) supervision required  -DN  -- supervision required  -DN    Time Frame (LB Dressing Goal 2, OT-IRF) long term goal (LTG)  -DN  -- long term goal (LTG)  -DN    Progress/Outcomes (LB Dressing Goal 2, OT-IRF) medical status inhibiting progress;goal ongoing  -DN goal met  -DN goal ongoing  -DN       Toileting Goal 1 (OT-IRF)    Activity/Device (Toileting Goal 1, OT-IRF) toileting skills, all;perform perineal hygiene;adjust/manage clothing;grab bar/safety frame  -DN toileting skills, all;perform perineal hygiene;adjust/manage clothing;grab bar/safety frame  -DN toileting skills, all;perform perineal hygiene;adjust/manage clothing;grab bar/safety frame  -DN    [place] Level (Toileting Goal 1, OT-IRF) supervision required  -DN  -- contact guard assist  -DN    Time Frame (Toileting Goal 1, OT-IRF)  --  -- short term goal (STG)  -DN    Progress/Outcomes (Toileting Goal 1, OT-IRF) medical status inhibiting progress;goal ongoing  -DN goal met  -DN goal met;goal revised this date  -DN       Toileting Goal 2 (OT-IRF)    Activity/Device (Toileting Goal 2, OT-IRF)  -- toileting skills, all;adjust/manage clothing;perform perineal hygiene  -DN toileting skills, all;adjust/manage clothing;perform perineal hygiene  -DN    [place] Level (Toileting Goal 2, OT-IRF) supervision required;verbal cues required  -DN  -- supervision required;verbal cues required  -DN    Time Frame (Toileting Goal 2,  OT-IRF) long term goal (LTG)  -DN  -- long term goal (LTG)  -DN    Progress/Outcomes (Toileting Goal 2, OT-IRF) medical status inhibiting progress;goal ongoing  -DN goal met  -DN goal ongoing  -DN       Strength Goal 1 (OT-IRF)    Strength Goal 1 (OT-IRF) Pt to increase LUE strength to 4-/5 to assist with ADLs.  -DN Pt to increase LUE strength to 4-/5 to assist with ADLs.  -DN Pt to increase LUE strength to 4-/5 to assist with ADLs.  -DN    Time Frame (Strength Goal 1, OT-IRF) short term goal (STG);1 week  -DN  -- short term goal (STG);1 week  -DN    Progress/Outcomes (Strength Goal 1, OT-IRF) goal met  -DN goal met  -DN goal met;goal revised this date  -DN       Strength Goal 2 (OT-IRF)    Strength Goal 2 (OT-IRF) Pt to increase LUE strength to 4/5 to assist with ADL.  -DN  -- Pt to increase LUE strength to 4/5 to assist with ADL.  -DN    Time Frame (Strength Goal 2, OT-IRF) long term goal (LTG);by discharge  -DN long term goal (LTG);by discharge  -DN long term goal (LTG);by discharge  -DN    Progress/Outcomes (Strength Goal 2, OT-IRF) goal met  -DN goal met  -DN goal ongoing  -DN       Balance Goal 1 (OT)    Activity/Assistive Device (Balance Goal 1, OT) standing, static;standing, dynamic  -DN standing, static;standing, dynamic  -DN standing, static;standing, dynamic  -DN    [place] Level/Cues Needed (Balance Goal 1, OT) supervision required  -DN  -- supervision required  -DN    Time Frame (Balance Goal 1, OT) long term goal (LTG);by discharge  -DN  -- long term goal (LTG);by discharge  -DN    Progress/Outcomes (Balance Goal 1, OT) medical status inhibiting progress;goal ongoing  -DN goal met  -DN goal ongoing  -DN       Caregiver Training Goal 1 (OT-IRF)    Caregiver Training Goal 1 (OT-IRF) Pt and family to be indep with HEP, AD/AE, ADLs, functional transfers and IADL tasks upon d/c isamar.  -DN Pt and family to be indep with HEP, AD/AE, ADLs, functional transfers and IADL tasks upon d/c isamar.  -DN Pt and  family to be indep with HEP, AD/AE, ADLs, functional transfers and IADL tasks upon d/c isamar.  -DN    Time Frame (Caregiver Training Goal 1, OT-IRF) long term goal (LTG);by discharge  -DN  -- long term goal (LTG);by discharge  -DN    Progress/Outcomes (Caregiver Training Goal 1, OT-IRF) goal met  -DN goal met  -DN goal ongoing;goal not met  -DN      User Key  (r) = Recorded By, (t) = Taken By, (c) = Cosigned By    Initials Name Provider Type    GRACIELA Bocanegra Occupational Therapist                Outcome Measures     Row Name 07/02/18 1400             Modified [place] Scale    Modified [place] Scale 3 - Moderate disability.  Requiring some help, but able to walk without assistance.  -DN         Functional Assessment    Outcome Measure Options Modified [place]  -DN        User Key  (r) = Recorded By, (t) = Taken By, (c) = Cosigned By    Initials Name Provider Type    GRACIELA Bocanegra Occupational Therapist             Time Calculation:           Time Calculation- OT     Row Name 07/03/18 1513 07/03/18 0917          Time Calculation- OT    OT Start Time 1330  -DN 0830  -DN     OT Stop Time 1400  -DN 0900  -DN     OT Time Calculation (min) 30 min  -DN 30 min  -DN     OT Received On 07/03/18  -DN 07/03/18  -DN       User Key  (r) = Recorded By, (t) = Taken By, (c) = Cosigned By    Initials Name Provider Type    GRACIELA Bocanegra Occupational Therapist          Therapy Suggested Charges     Code   Minutes Charges    None              Therapy Charges for Today     Code Description Service Date Service Provider Modifiers Qty    90871426077 HC OT SELF CARE/MGMT/TRAIN EA 15 MIN 7/2/2018 GRACIELA Garcia GO 2    74974547302 HC OT THER PROC EA 15 MIN 7/2/2018 GRACIELA Garcia GO 2    78352624694 HC OT SELF CARE/MGMT/TRAIN EA 15 MIN 7/3/2018 GRACIELA Garcia GO 2    31133224437 HC OT THER PROC EA 15 MIN 7/3/2018 GRACIELA Garcia GO 2                   GRACIELA Garcia  7/3/2018

## 2018-07-03 NOTE — PROGRESS NOTES
Inpatient Rehabilitation Functional Measures Assessment    Functional Measures  DAVEY Eating:  Branch  [facility] Grooming: Branch  [facility] Bathing:  Branch  [facility] Upper Body Dressing:  Branch  [facility] Lower Body Dressing:  Branch  [facility] Toileting:  Activity was not observed.    DAVEY Bladder Management  Level of Assistance:  Bladder Score = 5.  Patient is supervision/set-up for  bladder management, requiring: Setting out equipment. Emptying equipment. No  assistive devices were required.  Frequency/Number of Accidents this Shift:  Bladder accidents this shift:  0 .  Patient has not had an accident, but used a device/medication this shift  requiring: Urinal .    DAVEY Bowel Management  Level of Assistance: Activity was not observed.  Frequency/Number of Accidents this Shift: Branch    [facility] Bed/Chair/Wheelchair Transfer:  Activity was not observed.  DAVEY Toilet Transfer:  Branch  [facility] Tub/Shower Transfer:  Branch    Previously Documented Mode of Locomotion at Discharge: Field  DAVEY Expected Mode of Locomotion at Discharge: Branch  [facility] Walk/Wheelchair:  Branch  [facility] Stairs:  Branch    [facility] Comprehension:  Branch  [facility] Expression:  Branch  [facility] Social Interaction:  Branch  [facility] Problem Solving:  Branch  [facility] Memory:  Branch    Therapy Mode Minutes  Occupational Therapy: Branch  Physical Therapy: Branch  Speech Language Pathology:  Branch    Signed by: CA Ivy

## 2018-07-03 NOTE — PLAN OF CARE
Problem: Patient Care Overview  Goal: Plan of Care Review  Outcome: Ongoing (interventions implemented as appropriate)   07/03/18 7767   Patient Care Overview   IRF Plan of Care Review progress ongoing, continue   Progress, Functional Goals demonstrating adequate progress   OTHER   Outcome Summary Afebrile this shift. Feeling tired, HR irregular, EKG done, pt in afib, cardiology to see. No unsafe behavior. Skin intact. .   Coping/Psychosocial   Plan of Care Reviewed With patient       Problem: Stroke (Hemorrhagic) (Adult)  Goal: Signs and Symptoms of Listed Potential Problems Will be Absent, Minimized or Managed (Stroke)  Outcome: Ongoing (interventions implemented as appropriate)      Problem: Fall Risk (Adult)  Goal: Absence of Fall  Outcome: Ongoing (interventions implemented as appropriate)      Problem: Dysphagia (Adult)  Goal: Functional/Safe Swallow  Outcome: Ongoing (interventions implemented as appropriate)      Problem: Skin Injury Risk (Adult)  Goal: Skin Health and Integrity  Outcome: Ongoing (interventions implemented as appropriate)

## 2018-07-03 NOTE — PROGRESS NOTES
Occupational Therapy: Individual: 60 minutes.    Physical Therapy: Branch    Speech Language Pathology:  Branch    Signed by: GRACIELA Garcia/MARCY

## 2018-07-03 NOTE — SIGNIFICANT NOTE
07/03/18 1518   Rehab Treatment   Discipline speech language pathologist   Reason Treatment Not Performed patient/family declined treatment, not feeling well  (patient declined attempted make-up session; stated he was too tired)

## 2018-07-03 NOTE — PROGRESS NOTES
Inpatient Rehabilitation Functional Measures Assessment and Plan of Care    Plan of Care  Updated Problems/Interventions  Mobility    [PT] Bed/Chair/Wheelchair(Active)  Current Status(07/03/2018): SBA  Weekly Goal(07/19/2018): supervision  Discharge Goal: Supervision    [PT] Bed Mobility(Active)  Current Status(07/03/2018): Mod INDEP  Weekly Goal(07/12/2018): Indep  Discharge Goal: Indep    [PT] Stairs(Active)  Current Status(07/03/2018): 8 w/rail, CGA  Weekly Goal(07/19/2018): PT only  Discharge Goal: 4, 1 rail, CGA    [PT] Walk(Active)  Current Status(07/03/2018): 240 ft, Rwx,CGA/SBA  Weekly Goal(07/12/2018): To BR, Rwx, supervision  Discharge Goal: Amb w Rwx 260 ft, supervision    Functional Measures  DAVEY Eating:  Branch  DAVEY Grooming: Branch  DAVEY Bathing:  Branch  DAVEY Upper Body Dressing:  Branch  DAVEY Lower Body Dressing:  Branch  [facility] Toileting:  Branch    [facility] Bladder Management  Level of Assistance:  Branch  Frequency/Number of Accidents this Shift:  Branch    DAVEY Bowel Management  Level of Assistance: [place]  Frequency/Number of Accidents this Shift: Branch    DAVEY Bed/Chair/Wheelchair Transfer:  Bed/chair/wheelchair Transfer Score = 5.  Patient is supervision/set-up for transferring to and from the  bed/chair/wheelchair, requiring: Patient requires the following assistive  device(s): Walker.  DAVEY Toilet Transfer:  Branch  DAVEY Tub/Shower Transfer:  Branch    Previously Documented Mode of Locomotion at Discharge: Field  [facility] Expected Mode of Locomotion at Discharge: Branch  [facility] Walk/Wheelchair:  WHEELCHAIR OBSERVATION   Activity was not observed.    WALK OBSERVATION   Walk Distance Scale = 3.  Distance walked is greater than 150 feet. Walk Score  = 4.  Patient performs 75% or more of effort and requires minimal assistance.  Incidental help/contact guard/steadying was provided. Patient walked a distance  of  260 feet. Patient requires the following assistive device(s): Rolling  walker.  DAVEY Stairs:  Stairs Score =  2.  Incidental assistance with lifting or lowering,  contact guard or steadying was provided. Patient performs 75% or more of effort  and requires minimal contact assistance. Patient negotiated  8 stairs. Patient  requires the following assistive device(s): Handrail(s).    DAVEY Comprehension:  [place]  DAVEY Expression:  [facility] Social Interaction:  [facility] Problem Solving:  [facility] Memory:  [place]    Therapy Mode Minutes  Occupational Therapy: [place]  Physical Therapy: Individual: 60 minutes.  Speech Language Pathology:  [place]    Signed by: Chey Owens PTA

## 2018-07-03 NOTE — PROGRESS NOTES
Inpatient Rehabilitation Functional Measures Assessment    Functional Measures  DAVEY Eating:  [facility] Grooming: [facility] Bathing:  [facility] Upper Body Dressing:  [facility] Lower Body Dressing:  [facility] Toileting:  [facility] Bladder Management  Level of Assistance:  [place]  Frequency/Number of Accidents this Shift:  [facility] Bowel Management  Level of Assistance: [place]  Frequency/Number of Accidents this Shift: [facility] Bed/Chair/Wheelchair Transfer:  [facility] Toilet Transfer:  [facility] Tub/Shower Transfer:  [place]    Previously Documented Mode of Locomotion at Discharge: Field  DAVEY Expected Mode of Locomotion at Discharge: [facility] Walk/Wheelchair:  [facility] Stairs:  [facility] Comprehension:  Both ( auditory and visual) modes of comprehension are used  equally. Comprehension Score = 6, Modified [place].  Patient comprehends  complex/abstract information in their primary language, requiring: Glasses.  DAVEY Expression:  Vocal expression is the usual mode. Expression Score = 6,  Modified Independent.  Patient expresses complex/abstract information in their  primary language, requiring: Additional time.  DAVEY Social Interaction:  Social Interaction Score = 6, Modified Independent.  Patient is modified independent for social interaction, requiring: Requires  additional time. dysarthria .  DAVEY Problem Solving:  Problem Solving Score = 6, Modified [place].  Patient  makes appropriate decisions in order to solve complex problems, but requires  extra time. dysarthria  DAVEY Memory:  Memory Score = 6, Modified [place].  Patient is modified  independent for memory, requiring: Requires additional time.    Therapy Mode Minutes  Occupational Therapy: Branch  Physical Therapy: Branch  Speech Language Pathology:  Branch    Signed by: Grayson Espinoza RN

## 2018-07-03 NOTE — THERAPY TREATMENT NOTE
Inpatient Rehabilitation - Occupational Therapy Treatment Note    [facility]     Patient Name: Martín Souza  : 1940  MRN: [id number]    Today's Date: 7/3/2018                 Admit Date: 6/15/2018      Visit Dx:    ICD-10-CM ICD-9-CM   1. Dysarthria R47.1 784.51       Patient Active Problem List   Diagnosis   • CVA (cerebral vascular accident) (CMS/HCC)         Therapy Treatment          IRF Treatment Summary     Row Name 18 09             Evaluation/Treatment Time and Intent    Subjective Information weakness;fatigue  -DN      Document Type therapy note (daily note)  -DN      Mode of Treatment occupational therapy  -DN      Patient/Family Observations Addendum to d/c summary of yesterday, pt still has fever and will stay in Rehab till acceptable temp to go home, will revise goals  -DN      Recorded by [DN] GRACIELA Garcia      Row Name 18 09             Family/Support System    Health Advocacy (Family/Support System) caregiver advocates for patient's health  -DN      Recorded by [DN] GRACIELA Garcia      Row Name 18 09             Cognition/Psychosocial- PT/OT    Affect/Mental Status (Cognitive) WFL  -DN      Orientation Status (Cognition) oriented x 4  -DN      Follows Commands (Cognition) follows one step commands;over 90% accuracy  -DN      Personal Safety Interventions fall prevention program maintained;gait belt  -DN      Cognitive Function (Cognitive) safety deficit  -DN      Safety Deficit (Cognitive) mild deficit;severe deficit;awareness of need for assistance  -DN      Recorded by [DN] GRACIELA Garcia      Row Name 18 09             Bathing Assessment/Treatment    Bathing [place] Level bathing skills;lower body;upper body;supervision  -DN      Assistive Device (Bathing) shower chair;grab bar/tub rail  -DN      Bathing Position supported sitting;supported standing  -DN      Recorded by [DN] GRACIELA Garcia      Row Name 18 09              Upper Body Dressing Assessment/Treatment    Upper Body Dressing Task upper body dressing skills;doff;don;pull over garment;set up assistance  -DN      Upper Body Dressing Position supported sitting  -DN      Set-up Assistance (Upper Body Dressing) obtain clothing  -DN      Recorded by [DN] GRACIELA Garcia      Row Name 07/03/18 0904             Lower Body Dressing Assessment/Treatment    Lower Body Dressing [place] Level doff;don;pants/bottoms;shoes/slippers;socks;underwear;contact guard assist  -DN      Lower Body Dressing Position supported sitting;supported standing  -DN      Lower Body Dressing Setup Assistance obtain clothing  -DN      Comment (Lower Body Dressing) pt is weak from illness now needs CGA for static standing due to weakness  -DN      Recorded by [DN] GRACIELA Garcia      Row Name 07/03/18 0904             Grooming Assessment/Treatment    Grooming [place] Level grooming skills;deodorant application;hair care, combing/brushing;set up  -DN      Grooming Position sink side;supported sitting  -DN      Recorded by [DN] GRACIELA Garcia      Row Name 07/03/18 0904             Toileting Assessment/Treatment    Toileting [place] Level toileting skills;adjust/manage clothing;perform perineal hygiene;contact guard assist  -DN      Assistive Device Use (Toileting) grab bar/safety frame;raised toilet seat  -DN      Toileting Position supported standing  -DN      Recorded by [DN] GRACIELA Garcia      Row Name 07/03/18 0904             Pain Scale: Numbers Pre/Post-Treatment    Pain Scale: Numbers, Pretreatment 0/10 - no pain  -DN      Pain Scale: Numbers, Post-Treatment 0/10 - no pain  -DN      Recorded by [DN] GRACIELA Garcia      Row Name 07/03/18 0904             Positioning and Restraints    Pre-Treatment Position in bed  -DN      Post Treatment Position wheelchair  -DN      In Bed sitting;with nsg  -DN      Recorded by [DN] GRACIELA Garcia      Row Name 07/03/18 0904              Daily Summary of Progress (OT)    Functional Goal Overall Progress: Occupational Therapy progressing towards functional goals slower than expected  -DN      Impairments Continuing to Limit Function: Occupational Therapy pt is weak from illness which had halted d/c today and has made pt unstable at times due to overall weakness  -DN      Recommendations: Occupational Therapy will alter goals for d,c home when appropriate  -DN      Recorded by [DN] GRACIELA Garcia        User Key  (r) = Recorded By, (t) = Taken By, (c) = Cosigned By    Initials Name Effective Dates    GRACIELA Bocanegra 06/08/18 -                  OT Recommendation and Plan    Anticipated Discharge Disposition (OT): home with assist       Daily Summary of Progress (OT)  Functional Goal Overall Progress: Occupational Therapy: progressing towards functional goals slower than expected  Impairments Continuing to Limit Function: Occupational Therapy: pt is weak from illness which had halted d/c today and has made pt unstable at times due to overall weakness  Recommendations: Occupational Therapy: will alter goals for d,c home when appropriate          OT IRF GOALS     Row Name 07/03/18 0911 07/02/18 1400 06/25/18 1200       Transfer Goal 1 (OT-IRF)    Activity/Assistive Device (Transfer Goal 1, OT-IRF) toilet;shower chair;walk-in shower;walker, rolling  -DN toilet;shower chair;walk-in shower;walker, rolling  -DN toilet;shower chair;walk-in shower;walker, rolling  -DN    [place] Level (Transfer Goal 1, OT-IRF) supervision required;verbal cues required  -DN supervision required;verbal cues required  -DN supervision required;verbal cues required  -DN    Time Frame (Transfer Goal 1, OT-IRF) short term goal (STG)  -DN  -- short term goal (STG)  -DN    Barriers (Transfer Goal 1, OT-IRF)  -- pt ill last couple of days   -DN  --    Progress/Outcomes (Transfer Goal 1, OT-IRF) medical status inhibiting progress;goal ongoing  -DN goal not met  -DN good  progress toward goal;goal revised this date;goal ongoing  -DN       Transfer Goal 2 (OT-IRF)    Activity/Assistive Device (Transfer Goal 2, OT-IRF) toilet;shower chair;walk-in shower;walker, rolling  -DN toilet;shower chair;walk-in shower;walker, rolling  -DN toilet;shower chair;walk-in shower;walker, rolling  -DN    [place] Level (Transfer Goal 2, OT-IRF) supervision required;verbal cues required  -DN supervision required;verbal cues required  -DN supervision required;verbal cues required  -DN    Time Frame (Transfer Goal 2, OT-IRF) long term goal (LTG)  -DN  -- long term goal (LTG)  -DN    Progress/Outcomes (Transfer Goal 2, OT-IRF) medical status inhibiting progress;goal ongoing  -DN goal not met  -DN goal revised this date;goal ongoing  -DN       Bathing Goal 1 (OT-IRF)    Activity/Device (Bathing Goal 1, OT-IRF) bathing skills, all;grab bar/tub rail;hand-held shower spray hose;shower chair  -DN bathing skills, all;grab bar/tub rail;hand-held shower spray hose;shower chair  -DN bathing skills, all;grab bar/tub rail;hand-held shower spray hose;shower chair  -DN    [place] Level (Bathing Goal 1, OT-IRF) supervision required;verbal cues required  -DN  -- supervision required;verbal cues required  -DN    Time Frame (Bathing Goal 1, OT-IRF) short term goal (STG)  -DN  -- short term goal (STG)  -DN    Progress/Outcomes (Bathing Goal 1, OT-IRF) goal met  -DN goal met;goal revised this date  -DN goal met;goal revised this date  -DN       Bathing Goal 2 (OT-IRF)    Activity/Device (Bathing Goal 2, OT-IRF)  -- bathing skills, all;grab bar/tub rail;hand-held shower spray hose;shower chair  -DN bathing skills, all;grab bar/tub rail;hand-held shower spray hose;shower chair  -DN    [place] Level (Bathing Goal 2, OT-IRF) supervision required  -DN  -- supervision required  -DN    Time Frame (Bathing Goal 2, OT-IRF) long term goal (LTG);by discharge  -DN  -- long term goal (LTG);by discharge  -DN     Progress/Outcomes (Bathing Goal 2, OT-IRF) goal met  -DN goal not met  -DN goal ongoing  -DN       UB Dressing Goal 1 (OT-IRF)    Activity/Device (UB Dressing Goal 1, OT-IRF) upper body dressing  -DN upper body dressing  -DN upper body dressing  -DN    [place] (UB Dress Goal 1, OT-IRF) supervision required  -DN  -- supervision required  -DN    Time Frame (UB Dressing Goal 1, OT-IRF) short term goal (STG)  -DN  -- short term goal (STG)  -DN    Progress/Outcomes (UB Dressing Goal 1, OT-IRF) goal met  -DN goal met  -DN goal met;goal ongoing  -DN       UB Dressing Goal 2 (OT-IRF)    Activity/Device (UB Dressing Goal 2, OT-IRF) upper body dressing  -DN upper body dressing  -DN upper body dressing  -DN    [place] (UB Dress Goal 2, OT-IRF) supervision required  -DN  -- supervision required  -DN    Time Frame (UB Dressing Goal 2, OT-IRF) long term goal (LTG)  -DN  -- long term goal (LTG)  -DN    Progress/Outcomes (UB Dressing Goal 2, OT-IRF) goal met  -DN goal met  -DN goal ongoing  -DN       LB Dressing Goal 1 (OT-IRF)    Activity/Device (LB Dressing Goal 1, OT-IRF) lower body dressing  -DN lower body dressing  -DN lower body dressing  -DN    [place] (LB Dressing Goal 1, OT-IRF) supervision required  -DN  -- contact guard assist;supervision required  -DN    Time Frame (LB Dressing Goal 1, OT-IRF) short term goal (STG)  -DN  -- short term goal (STG)  -DN    Progress/Outcomes (LB Dressing Goal 1, OT-IRF) medical status inhibiting progress;goal ongoing  -DN goal met  -DN goal met;goal revised this date  -DN       LB Dressing Goal 2 (OT-IRF)    Activity/Device (LB Dressing Goal 2, OT-IRF) lower body dressing  -DN lower body dressing  -DN lower body dressing  -DN    [place] (LB Dressing Goal 2, OT-IRF) supervision required  -DN  -- supervision required  -DN    Time Frame (LB Dressing Goal 2, OT-IRF) long term goal (LTG)  -DN  -- long term goal (LTG)  -DN    Progress/Outcomes (LB Dressing Goal 2, OT-IRF)  medical status inhibiting progress;goal ongoing  -DN goal met  -DN goal ongoing  -DN       Toileting Goal 1 (OT-IRF)    Activity/Device (Toileting Goal 1, OT-IRF) toileting skills, all;perform perineal hygiene;adjust/manage clothing;grab bar/safety frame  -DN toileting skills, all;perform perineal hygiene;adjust/manage clothing;grab bar/safety frame  -DN toileting skills, all;perform perineal hygiene;adjust/manage clothing;grab bar/safety frame  -DN    [place] Level (Toileting Goal 1, OT-IRF) supervision required  -DN  -- contact guard assist  -DN    Time Frame (Toileting Goal 1, OT-IRF)  --  -- short term goal (STG)  -DN    Progress/Outcomes (Toileting Goal 1, OT-IRF) medical status inhibiting progress;goal ongoing  -DN goal met  -DN goal met;goal revised this date  -DN       Toileting Goal 2 (OT-IRF)    Activity/Device (Toileting Goal 2, OT-IRF)  -- toileting skills, all;adjust/manage clothing;perform perineal hygiene  -DN toileting skills, all;adjust/manage clothing;perform perineal hygiene  -DN    [place] Level (Toileting Goal 2, OT-IRF) supervision required;verbal cues required  -DN  -- supervision required;verbal cues required  -DN    Time Frame (Toileting Goal 2, OT-IRF) long term goal (LTG)  -DN  -- long term goal (LTG)  -DN    Progress/Outcomes (Toileting Goal 2, OT-IRF) medical status inhibiting progress;goal ongoing  -DN goal met  -DN goal ongoing  -DN       Strength Goal 1 (OT-IRF)    Strength Goal 1 (OT-IRF) Pt to increase LUE strength to 4-/5 to assist with ADLs.  -DN Pt to increase LUE strength to 4-/5 to assist with ADLs.  -DN Pt to increase LUE strength to 4-/5 to assist with ADLs.  -DN    Time Frame (Strength Goal 1, OT-IRF) short term goal (STG);1 week  -DN  -- short term goal (STG);1 week  -DN    Progress/Outcomes (Strength Goal 1, OT-IRF) goal met  -DN goal met  -DN goal met;goal revised this date  -DN       Strength Goal 2 (OT-IRF)    Strength Goal 2 (OT-IRF) Pt to increase LUE  strength to 4/5 to assist with ADL.  -DN  -- Pt to increase LUE strength to 4/5 to assist with ADL.  -DN    Time Frame (Strength Goal 2, OT-IRF) long term goal (LTG);by discharge  -DN long term goal (LTG);by discharge  -DN long term goal (LTG);by discharge  -DN    Progress/Outcomes (Strength Goal 2, OT-IRF) goal met  -DN goal met  -DN goal ongoing  -DN       Balance Goal 1 (OT)    Activity/Assistive Device (Balance Goal 1, OT) standing, static;standing, dynamic  -DN standing, static;standing, dynamic  -DN standing, static;standing, dynamic  -DN    [place] Level/Cues Needed (Balance Goal 1, OT) supervision required  -DN  -- supervision required  -DN    Time Frame (Balance Goal 1, OT) long term goal (LTG);by discharge  -DN  -- long term goal (LTG);by discharge  -DN    Progress/Outcomes (Balance Goal 1, OT) medical status inhibiting progress;goal ongoing  -DN goal met  -DN goal ongoing  -DN       Caregiver Training Goal 1 (OT-IRF)    Caregiver Training Goal 1 (OT-IRF) Pt and family to be indep with HEP, AD/AE, ADLs, functional transfers and IADL tasks upon d/c [place].  -DN Pt and family to be indep with HEP, AD/AE, ADLs, functional transfers and IADL tasks upon d/c [place]amar.  -DN Pt and family to be indep with HEP, AD/AE, ADLs, functional transfers and IADL tasks upon d/c isamar.  -DN    Time Frame (Caregiver Training Goal 1, OT-IRF) long term goal (LTG);by discharge  -DN  -- long term goal (LTG);by discharge  -DN    Progress/Outcomes (Caregiver Training Goal 1, OT-IRF) goal met  -DN goal met  -DN goal ongoing;goal not met  -DN      User Key  (r) = Recorded By, (t) = Taken By, (c) = Cosigned By    Initials Name Provider Type    GRACIELA Bocanegra Occupational Therapist                Outcome Measures     Row Name 07/02/18 1400             Modified [place] Scale    Modified [place] Scale 3 - Moderate disability.  Requiring some help, but able to walk without assistance.  -DN         Functional Assessment    Outcome Measure  Options Modified [place]  -ELIAS        User Key  (r) = Recorded By, (t) = Taken By, (c) = Cosigned By    Initials Name Provider Type    GRACIELA Bocanegra Occupational Therapist             Time Calculation:           Time Calculation- OT     Row Name 07/03/18 0917             Time Calculation- OT    OT Start Time 0830  -DN      OT Stop Time 0900  -DN      OT Time Calculation (min) 30 min  -DN      OT Received On 07/03/18  -DN        User Key  (r) = Recorded By, (t) = Taken By, (c) = Cosigned By    Initials Name Provider Type    GRACIELA Bocanegra Occupational Therapist          Therapy Suggested Charges     Code   Minutes Charges    None              Therapy Charges for Today     Code Description Service Date Service Provider Modifiers Qty    81230254834 HC OT SELF CARE/MGMT/TRAIN EA 15 MIN 7/2/2018 GRACIELA Garcia GO 2    62452261750 HC OT THER PROC EA 15 MIN 7/2/2018 GRACIELA Garcia GO 2    60423644420 HC OT SELF CARE/MGMT/TRAIN EA 15 MIN 7/3/2018 GRACIELA Garcia GO 2                   GRACIELA Garcia  7/3/2018

## 2018-07-03 NOTE — PROGRESS NOTES
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   Dysarthria  Dysphagia  Dysphonia  PAF with previous chronic anticoagulation  CKD3  HTN       Subjective     History of Present Illness    Subjective    Temp last PM. C/o fatigue. No neuro changes. Has hiccups, discussed can be from brainstem stroke.  PT not note any neuro/functional changes.      History taken from: patient    Objective     Vital Signs  Temp:  [98.4 °F (36.9 °C)-100.6 °F (38.1 °C)] 98.4 °F (36.9 °C)  Heart Rate:  [82-98] 82  Resp:  [18-20] 18  BP: (125-134)/(62-66) 125/62    Objective:  Vital signs: (most recent): Blood pressure 123/71, pulse 84, temperature 98.7 °F (37.1 °C), temperature source Oral, resp. rate 18, height 170.2 cm (67\"), weight 80.6 kg (177 lb 12.8 oz), SpO2 96 %.          PHYSICAL EXAM  Mental status-awake alert  Lungs-clear to auscultation  Heart-IRREG  Abdomen-normoactive bowel sounds soft and nontender  Extremities-no signficant edema BLE  Neurologic-dysarthria.  Dysmetria with the left upper extremity improved.  Left hemiparesis - improved. Takes resistance on left side well with left EF/FF/hand intrinsics/KE/ADF.   Results Review:     I reviewed the patient's new clinical results.       Results from last 7 days  Lab Units 07/03/18  1613 06/29/18  1919   SODIUM mmol/L 135* 137   POTASSIUM mmol/L 3.3* 3.3*   CHLORIDE mmol/L 99 100   CO2 mmol/L 23.1 22.9   BUN mg/dL 30* 20   CREATININE mg/dL 1.58* 1.36*   CALCIUM mg/dL 8.7 9.0   BILIRUBIN mg/dL 0.5 0.6   ALK PHOS U/L 166* 130*   ALT (SGPT) U/L 71* 127*   AST (SGOT) U/L 27 46*   GLUCOSE mg/dL 153* 203*       Results from last 7 days  Lab Units 07/03/18  1613 07/02/18  1026 06/28/18  1626   WBC 10*3/mm3 7.96 10.03 7.76   HEMOGLOBIN g/dL 13.1* 13.9 13.8   HEMATOCRIT % 39.7* 41.1 40.3*   PLATELETS 10*3/mm3 179 182 137*         VFSS June 18 - FINDINGS: Patient demonstrated a mild " oropharyngeal dysphagia  characterized by penetration of thin and mixed consistencies. Mild to  moderate residue with pooling of oral residue to valleculae and pyriform  sinuses. Reduced timing and tongue base retraction. Epiglottic  deflection improved with weight of bolus.    VFSS June 29 - no change.     -------------------------------------------------------    NONCONTRAST HEAD CT 06/21/2018     CLINICAL HISTORY: Follow-up for right anterior pontomedullary  hemorrhage.     TECHNIQUE: Spiral CT images were obtained from the base of the skull to  the vertex without intravenous contrast. Images were reformatted and  submitted in 3 mm thick axial CT sections. Additional 2 mm thick  sagittal and coronal reconstructions were performed with brain  algorithm.     This is correlated to outside noncontrast head CT Kemi 10, 2018 as well  as outside MRI of the head and MRV of the dural venous sinuses on  06/09/2018. There are no prior studies from [facility]  for comparison.     FINDINGS: There is some patchy low-density in periventricular extending  into the subcortical white matter of the cerebral hemispheres consistent  with mild-to-moderate small vessel disease. There is a 17 x 10 mm focal  area of encephalomalacia in the medial left frontoparietal region  consistent with an old infarct in the left anterior cerebral artery  territory. The density of the previously identified acute  intraparenchymal hemorrhage extending from the right anterior velma into  the right anterior medulla has decreased and there is now a chronic  hematoma cavity measuring about 9 x 7 mm in anterior posterior and  medial lateral dimension. There is no surrounding edema and no mass  effect. The etiology of the hemorrhage remains uncertain. The ventricles  are normal in size. There is no midline shift. No extra-axial fluid  collections are identified and no acute intracranial hemorrhage is seen.  The paranasal sinuses and mastoid air  cells and middle ear cavities are  clear. Calcified atherosclerotic plaques are present in the cavernous  segments of the internal carotid arteries bilaterally.     IMPRESSION:     1. Since outside MRI of the head and MRV of the dural sinuses 06/09/2018  and outside noncontrast head CT 06/10/2018, there have been expected  evolutionary changes in the hemorrhage in the right anterior  pontomedullary junction. The density of the hemorrhage has significantly  decreased and now there is a 9 x 7 x 9 mm chronic hematoma cavity  extending from the right anterior inferior velma into the right anterior  medulla. There has been resolution of surrounding edema. There is no  mass effect. The etiology of the right anterior pontomedullary  hemorrhage remains uncertain. It could be from an underlying cavernous  malformation or hemorrhagic transformation of an arterial or venous  infarct. At some point a follow-up MRI of the head with and without  contrast and MRA/MRV could be obtained to reevaluate and compared to the  outside MRI/MRV 06/09/2018.     2. Mild-to-moderate small vessel disease in the cerebral white matter  and there is a 17 x 10 mm old superior medial left frontoparietal  infarct in the left anterior cerebral artery territory. The remainder of  the head CT is unremarkable.  -------------------------------------------------------------------------------    Medication Review: done  Scheduled Meds:    amLODIPine 10 mg Oral Q24H   atorvastatin 40 mg Oral Nightly   cetirizine 10 mg Oral Daily   dabigatran etexilate 150 mg Oral Q12H   hydrALAZINE 50 mg Oral Q8H   levothyroxine 112 mcg Oral Q AM   lisinopril 20 mg Oral Q12H   metoprolol tartrate 25 mg Oral Q12H   multivitamin 1 tablet Oral Daily   pantoprazole 40 mg Oral QAM   PARoxetine 10 mg Oral Daily   [START ON 7/7/2018] PARoxetine 5 mg Oral Daily     Continuous Infusions:   PRN Meds:.•  acetaminophen  •  hydrALAZINE  •  melatonin  •  MEXSANA  •  nitroglycerin  •   ondansetron      Assessment/Plan     Active Problems:    CVA (cerebral vascular accident) (CMS/HCC)      Assessment & Plan  S/p  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  Follow-up CT of the head on June 21 here with follow-up neurosurgery next day to determine possible resuming anticoagulation  June 25 - Per Dr Mendes, to hold Eliquis for at least two weeks pending [facility]. Seen by Dr Holland Neurosurgery on June 22 - cleared to resume anticoagulation. Discussed risks and benefits of resuming anticoagulation with patient today. He was cleared by Neurosurgery to resume anticoagulation.  Patient wishes to resume Eliquis.Will plan to start with dose this evening.   June 26 - patient declined Eliquis last PM as  his cardiologist wished to assess options.   Reviewed with Dr Carrasco - Cardiology - today. He has call out to Neurosurgery regarding resuming Eliquis vs change to Pradaxa as has a reversal agent available.     June 28- awaiting decision from Dr. Carrasco  June 30 - Discussed with Dr Carrasco - will start Pradaxa. Patient in agreemant.        Psych- on Paxil chronically - ? Discontinue if resumes oral anticoagulant as can increase risk of bleeding. June 22- CT stable. Anticipate will be resuming Eliquis when sees Neurosurgery. Reviewed with patient. Dr Talley not available today. Will taper off Paxil from 15 mg daily to 10 mg daily for 2 weeks, then 5 mg daily for two weeks, then stop. Will plan to discuss with PCP other options for anxiety. June 26- discussed with Dr Talley yesterday - plan for starting Buspar once tapers further on Paxil.     Elevated transaminases - June 30 - decreased atorvastatin to 1/2 dose to 40 mg daily. July 3 - ALT and AST improved    Dysarthria    Dysphagia- advanced to [facility] soft,NTL. Add E-stim for dysphagia.  - VFSS 6-29 - no change    Dysphonia    PAF with previous chronic anticoagulation- now on Pradaxa  July 3 - noted  irregular on exam today, EKG shows back in atrial fibrillation. Previous EKG June 11 at outside hospital NSR. Looking at graphics may have gone back into atrial fib when had temp yesterday. Outside cardiology clinic note June 6 notes history of difficulty tolerating AV dano blocking meds. On Metoprolol. K=3.3- replace, Mg 2.3. Recheck TSH in AM. Consulted cardiology for input on recurrent atrial fib as well as meds for HTN given increase in creatinine.     CKD3-  June 30 - Cr 1.39. July 3 - increased to 1.58. Cr was 1.5 mid-May, 1.6 at one point at outside hospital on June 9, but then improved to 1.0. . He was on Lisinopril at 40 mg daily as outpatient per office note June 6. Now on Lisinopril 20 mg bid. Will get input from Cardiology regarding BP meds as well.     HTN- Admit on amlodipine/lisinopril/metoprolol. June 20 - BP elevated last PM - added hydralazine 10 mg q 6 hours prn SBP> 160 or DBP> 100.  June 21-(has not received any when necessary hydralazine) added scheduled hydralazine 10 mg by mouth every 8 hours.  Has bradycardia pulse in the 50s so did not increase metoprolol.  On amlodipine 10 mg a day and lisinopril 20 mg twice a day. June 22- BP still elevated with . Increase Hydralazine to 25 mg q 8 hours. June 26 - edema 1+ BLE on amlodipine 10 mg daily. Had edema in past. 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 If BP better control, will decrease amlodipine to 5 mg. Consider diuretic. Will review with Cardiology. June 29 - BP increased when has temp elevation but better than afternoon. Edema better BLE so will note change from Norvasc 10 mg presently. June 30 -Follow BP, may increase hydralazine.  Edema resolved BLE. July 1 - -[phone] this AM. Review additional hydralazine 10 mg now at 10:30 and then increase from 25 mg q 8 hours to 50 mg q 8 hours. July 3 - BP better 125/62 range but back into atrial fib and creatinine increased . Get input from Cardiology on med adjustment.      Gout -   controlled with Tylenol - better.     Fever   June 28 -   Fever 101.5 . Nursing reports chills/ feels cold. No pulmonary or dysuria. No significant rash, No recent skin lesion, No recent infections.   Lungs CTA. Heart Mild tachy, regular, AB - NABS,soft, NT. Ext - left first MTP non-tender. Left calf larger than right.   Will check CXR, UA, Blood cultures with fever and chills, CBC.  Doubt gout fever with chills as no pain left first MTP now.  Doubt DVT with c/o chills but LLE larger than right and will check venous duplex.   BLE Venous duplex negative for DVT.  CXR   clear.   UA negative.    Procalcitonin 0.25   Will hold on antibiotics with low procalcitonin.  Could possibly have some pneumonitis as cause with his dysphagia.     June 29 - temp 100.4 this AM but afebrile this afternoon. Will recheck CBC. Will also recheck CMP to assess renal insuff and LFTs on high dose statin. Blood culture negative at 24 hours. LLE still larger than RLE but noticeable less - may be neurogenic edema.      July 1 - Temp 100.3 last PM, afebrile now. Cough greenish sputum up today, no previous pulm complaints. Reviewed will check viral panel, sputum specimen.  July 2 - viral panel negative. Blood culture negative at 3 days. Temp 101.0 this AM. Suspect represents viral illness. Recheck pro-calcitonin and WBC today - unremarkable. Pro-calcitonin mildly increased at 0.30, WBC 10.3  July 3 - Temp 100.6 last PM. No focal complaint. F/U CXR clear and WBC 7K. Suspect possible viral etiology. BC x 4 days.       Continue rehabilitation program.  Physical therapy 1 hour, Occupational therapy 1 hour, speech 1 hour, 5 days a week.  Rehabilitation nursing for carryover monitoring of his cardiac status, neurologic status, bowel bladder and skin.  Ongoing physician follow-up.  Weekly team conferences.  Goals and estimate length of stay and rehabilitation prognosis/medical prognosis indeterminate at this time    TEAM CONF- June 20 - BED  SUP,TRANSFERS CTG. GAIT 160 FEET MIN ASSIT. UBD SET UP. LBD MIN ASSIST.  MILD TO MODERATE DYSARTHRIA. LEFT FACIAL DROP.  DYSPHAGIA- MECH SOFT, NTL. ADD E-STIM  COGNITIIVE SCREEN APPEARS WFL. FURTHER TESTING FOR HIGHER LEVEL. CONTINENT BOWEL AND BLADDER  ELOS- 14 days  Addendum: Patient had fall in bathroom landed on back against the wall. He denies hitting head. Denies any significant back pain, no new numbness or weakness. Tolerates sitting. Order neuro checks q 4 hours x 2, then q shift.    TEAM CNF - June 27 - ADLS SBA-SET UP. . TRANSFERS CTG. GAIT 240 FEET CTG. 21 STAIRS CTG. DYSPHAGIA - TOLERATES TRIALS OF THIN LIQUIDS. E-STIM FOR DYSPHAGIA. USING STRATEGIES FOR ARTICULATION. CUES FOR SAFETY STRATEGIES WITH NURSING. CONTINENT.  BNE (Active)  Att'n. - WNL  Exec Fx. - WNL  Rsng/Jgmnt - WNL  Arith - Min Imp.  Visuospatial Skills - WNL  Visual Mem. - WNL  Verbal Mem - WNL  Emot - Pt denied dep/anx  ESEQUIELOS - AMBER Mckeon MD  07/03/18  1:30 PM    Time:        >35 minutes with > 50% face-to-face / floor time / coordination of care

## 2018-07-03 NOTE — PROGRESS NOTES
Adult Nutrition  Assessment/PES    Patient Name:  Martín Souza  YOB: 1940  MRN: [id number]  Admit Date:  6/15/2018    Assessment Date:  7/3/2018    Comments:  Follow up:  Good intake, 50-75%.  Continue to follow.           Reason for Assessment     Row Name 07/03/18 0943          Reason for Assessment    Reason For Assessment follow-up protocol                 Labs/Tests/Procedures/Meds     Row Name 07/03/18 0943          Labs/Procedures/Meds    Lab Results Reviewed reviewed        Diagnostic Tests/Procedures    Diagnostic Test/Procedure Reviewed reviewed, pertinent        Medications    Pertinent Medications Reviewed reviewed, pertinent     Pertinent Medications Comments synthroid, MVI             Physical Findings     Row Name 07/03/18 0944          Physical Findings    Skin --   intact               Nutrition Prescription Ordered     Row Name 07/03/18 0944          Nutrition Prescription PO    Current PO Diet Dysphagia     Dysphagia Level 4  Mechanical soft no mixed consistencies     Fluid Consistency Nectar/syrup thick     Common Modifiers Cardiac             Evaluation of Received Nutrient/Fluid Intake     Row Name 07/03/18 0944          PO Evaluation    % PO Intake 50-75%             Problem/Interventions:        Problem 1     Row Name 07/03/18 0945          Nutrition Diagnoses Problem 1    Problem 1 Swallowing Difficulty     Etiology (related to) Medical Diagnosis     Neurological CVA;Dysphagia     Signs/Symptoms (evidenced by) SLP/Swallow eval;PO Intake     Percent (%) intake recorded 75 %     Over number of meals 3     Swallow eval status Done     Type of SLP Evaluation VFSS                     Intervention Goal     Row Name 07/03/18 0922          Intervention Goal    General Maintain nutrition     PO Maintain intake;Tolerate PO     PO Intake % 75 %     Weight No significant weight loss             Nutrition Intervention     Row Name 07/03/18 0908          Nutrition Intervention     ENRICO/Tech Action Encourage intake;Follow Tx progress;Care plan reviewd               Education/Evaluation     Row Name 07/03/18 0945          Monitor/Evaluation    Monitor Per protocol         Electronically signed by:  Janie Santillan RD  07/03/18 9:46 AM

## 2018-07-03 NOTE — PROGRESS NOTES
Temp last PM. C/o fatigue. No neuro changes. Has hiccups, discussed can be from brainstem stroke.  PT not note any neuro/functional changes.  On exam, FTN =. Motor 5/5 BUE and BLE and continues improved left side strength.  Lungs CTA. Heart - irregular.    -fever - on NTL. Recheck CXR for any interval change since first fever on June 28.  - irregular heart beat on exam today and appears somewhat tachy. He has previously been regular on exam.  Recheck EKG as may be back in atrial fib to account for fatigue.    Last EKG at outside hospital - June 11.   Procedure: ELECTROCARDIOGRAM RESULT  Heart Rate     85  P-R Interval     164 ms  QRS Interval     92 ms  QT Interval     402 ms  QTC Interval     479 ms  P Axis     45 deg  QRS Axis     -18 deg  T Wave Axis     8 deg  DATE: 06/11/2018 15:35  SINUS RHYTHM  PROBABLE LEFT ATRIAL ABNORMALITY  PROBABLE INFERIOR INFARCT, AGE INDETERMINATE  Summary: Abnormal ECG    ADD: July 3, 2018  EKG shows back in atrial fib. H/o paroxysmal atrial fib. Will ask Cardiology here to assess. Recheck lytes.

## 2018-07-03 NOTE — PROGRESS NOTES
Case Management  Inpatient Rehabilitation Plan of Care and Discharge Plan Note    Rehabilitation Diagnosis:  Branch  Date of Onset:  Shadia    Medical Summary:  Branch  Past Medical History: Branch    Plan of Care  Updated Problems/Interventions  Field    Expected Intensity:  Branch  Interdisciplinary Team:  Shadia  Estimated Length of Stay/Anticipated Discharge Date: Branch  Anticipated Discharge Destination:  Anticipated discharge destination from inpatient rehabilitation is community  discharge with assistance. Patient lives with wife in 2 story home with  basement. Bedroom and bath on first floor.  Daughter lives local.  Family conference held with patient, wife, and daughter on 6/29. Family teaching  completed with wife.  D/C plan is home with wife.  Patient scheduled for outpatient PT and ST at [facility] starting on 7/9.      Based on the patient's medical and functional status, their prognosis and  expected level of functional improvement is:  Shadia    Signed by: MATTHEW Mcduffie

## 2018-07-03 NOTE — PLAN OF CARE
Problem: Patient Care Overview  Goal: Plan of Care Review  Outcome: Ongoing (interventions implemented as appropriate)   07/03/18 0220   Patient Care Overview   IRF Plan of Care Review progress ongoing, continue   Progress, Functional Goals demonstrating adequate progress   OTHER   Outcome Summary Pt. is calm and cooperative. No complained of any pain. Spouse at bedside in the evening. Temperature is 100.6 degree F. Tylenol 650mg and Melatonin 3mg PO PRN given. Whole meds with Apple Sauce. Hearing aid in place. Discharge is planning today   Coping/Psychosocial   Plan of Care Reviewed With patient     Goal: Community Reintegration Plan  Outcome: Ongoing (interventions implemented as appropriate)   07/03/18 0220   Community Reintegration Plan   Demonstrates Ability to Transition Safely Back to the Community Environment demonstrating adequate progress       Problem: Stroke (Hemorrhagic) (Adult)  Goal: Signs and Symptoms of Listed Potential Problems Will be Absent, Minimized or Managed (Stroke)  Outcome: Ongoing (interventions implemented as appropriate)   07/03/18 0220   Goal/Outcome Evaluation   Problems Assessed (Stroke (Hemorrhagic)) all   Problems Present (Stroke (Hemorrhagic)) eating/swallowing impairment;fever       Problem: Fall Risk (Adult)  Goal: Absence of Fall  Outcome: Ongoing (interventions implemented as appropriate)   07/03/18 0220   Fall Risk (Adult)   Absence of Fall making progress toward outcome       Problem: Dysphagia (Adult)  Goal: Functional/Safe Swallow  Outcome: Ongoing (interventions implemented as appropriate)   07/03/18 0220   Dysphagia (Adult)   Functional/Safe Swallow making progress toward outcome     Goal: Compensatory Techniques to Improve Safety/Function with Swallowing  Outcome: Ongoing (interventions implemented as appropriate)   07/03/18 0220   Dysphagia (Adult)   Compensatory Techniques to Improve Safety/Function with Swallowing making progress toward outcome       Problem: Skin  Injury Risk (Adult)  Goal: Skin Health and Integrity  Outcome: Ongoing (interventions implemented as appropriate)   07/03/18 6863   Skin Injury Risk (Adult)   Skin Health and Integrity making progress toward outcome

## 2018-07-03 NOTE — PROGRESS NOTES
Inpatient Rehabilitation Plan of Care Note    Plan of Care  Care Plan Reviewed - No updates at this time.    Sphincter Control    Performed Intervention(s)  Monitor I&O  Encourage fluid intake      Safety    Performed Intervention(s)  Safety rounds, items within reach  Bed and chair alarms  Safety protocol      Psychosocial    Performed Intervention(s)  Allow adequate time to express needs/ concerns  Medication as ordered    Signed by: Grayson Espinoza RN

## 2018-07-03 NOTE — PROGRESS NOTES
Inpatient Rehabilitation Functional Measures Assessment    Functional Measures  DAVEY Eating:  [facility] Grooming: [facility] Bathing:  [facility] Upper Body Dressing:  [facility] Lower Body Dressing:  [facility] Toileting:  [facility] Bladder Management  Level of Assistance:  [place]  Frequency/Number of Accidents this Shift:  [facility] Bowel Management  Level of Assistance: [place]  Frequency/Number of Accidents this Shift: [facility] Bed/Chair/Wheelchair Transfer:  [facility] Toilet Transfer:  [facility] Tub/Shower Transfer:  [place]    Previously Documented Mode of Locomotion at Discharge: Field  DAVEY Expected Mode of Locomotion at Discharge: [facility] Walk/Wheelchair:  [facility] Stairs:  [facility] Comprehension:  Auditory comprehension is the usual mode. Comprehension  Score = 6, Modified [place].  Patient comprehends complex/abstract  information in their primary language with only mild difficulty.  DAVEY Expression:  Vocal expression is the usual mode. Expression Score = 6,  Modified Independent.  Patient expresses complex/abstract information in their  primary language with only mild difficulty with tasks.  DAVEY Social Interaction:  Social Interaction Score = 6, Modified Independent.  Patient is modified independent for social interaction, requiring: Medications.    DAVEY Problem Solving:  Activity was not observed.  DAVEY Memory:  Memory Score = 6, Modified [place].  Patient is modified  independent for memory, having only mild difficulty and using self-initiated or  environmental cues to remember.    Therapy Mode Minutes  Occupational Therapy: [place]  Physical Therapy: [place]  Speech Language Pathology:  [place]    Signed by: Cassie Garcia RN

## 2018-07-03 NOTE — THERAPY TREATMENT NOTE
Inpatient Rehabilitation - IRF Speech Language Pathology   Swallow Treatment Note/Discharge   [facility]     Patient Name: Martín Souza  : 1940  MRN: [id number]  Today's Date: 7/3/2018               Admit Date: 6/15/2018    Visit Dx:      ICD-10-CM ICD-9-CM   1. Dysarthria R47.1 784.51     Patient Active Problem List   Diagnosis   • CVA (cerebral vascular accident) (CMS/HCC)       Therapy Treatment    Evaluation/Coping    Evaluation/Treatment Time and Intent  Subjective Information: complains of, fatigue (18 1000 : Katherine L Bruton)  Document Type: therapy note (daily note) (18 1000 : Katherine L Bruton)  Mode of Treatment: speech-language pathology, individual therapy (18 1000 : Katherine L Bruton)  Patient/Family Observations: seated in wc in his room; agreeable to therapy with encouragement (18 1000 : Katherine L Bruton)  Start Time (Evaluation/Treatment): 1000 (18 1000 : Katherine L Bruton)  Stop Time (Evaluation/Treatment): 1030 (18 1000 : Katherine L Bruton)    Vitals/Pain/Safety    Pain Scale: Numbers Pre/Post-Treatment  Pain Scale: Numbers, Pretreatment: 0/10 - no pain (18 1000 : Katherine L Bruton)    Cognition/Communication         Oral Motor/Eating         Mobility/Basic Activities/Instrumental Activities/Motor/Modality                   ROM/MMT                   Sensory/Myotome/Dermatome/Edema               Posture/Balance/Special Tests/Exercise/Transportation/Sexual Function                   Orthotics/Residual Limb/Prosthetic Management              Outcome Summary               SLP GOALS     Row Name 18 1000 18 1400 18 1000       Oral Nutrition/Hydration Goal 1 (SLP)    Oral Nutrition/Hydration Goal 1, SLP Pt will tolerate recommended diet   -KB Pt will tolerate recommended diet   -KB Pt will tolerate recommended diet   -KB    Barriers (Oral Nutrition/Hydration Goal 1, SLP) NMES at 5 mA this session; given trials of thin  liquid throughout the session, wet vocal quality was noted inconsistently, and patient was observed to cough x1  -KB NMES for dysphagia tolerated at 6mA, swallows with boluses of TL, NTL completed with NMES; patient was given trials of thin liquid while using NMES for dysphagia; cough on TL noted x2 trials, reduced coughing with reminder to take small sips; tolerated NTL withous overt s/s of pen/asp  -KB patient tolerated trials of thin liquid throughout the session without overt s/s of pen/asp  -KB       Lingual Strengthening Goal 1 (SLP)    Activity (Lingual Strengthening Goal 1, SLP) increase lingual tone/sensation/control/coordination/movement;increase buccal tension or tone;increase tongue back strength  -KB  -- increase lingual tone/sensation/control/coordination/movement;increase buccal tension or tone;increase tongue back strength  -KB    Increase Lingual Tone/Sensation/Control/Coordination/Movement lingual movement exercises;lingual resistance exercises  -KB  -- lingual movement exercises;lingual resistance exercises  -KB    Increase Buccal Tension or Tone MOST/swallow strong;swallow trials  -KB  -- MOST/swallow strong;swallow trials  -KB    Increase Tongue Back Strength lingual movement exercises;swallow trials;lingual resistance exercises  -KB  -- lingual movement exercises;swallow trials;lingual resistance exercises  -KB    [place]/Accuracy (Lingual Strengthening Goal 1, SLP) independently (over 90% accuracy)  -KB  -- with minimal cues (75-90% accuracy)  -KB    Progress/Outcomes (Lingual Strengthening Goal 1, SLP) good progress toward goal  -KB  -- goal ongoing  -KB       Pharyngeal Strengthening Exercise Goal 2 (SLP)    Increase Timing  -- shaker  -KB Mendelsohn;falsetto;hard effortful swallow;effortful pitch glide (falsetto + pharyngeal squeeze);chin tuck against resistance (CTAR);super-supraglottic swallow;joann  -KB    Increase Tongue Base Retraction  -- shaker  -KB hard effortful  swallow;joann  -ANYA       Swallow Compensatory Strategies Goal 1 (SLP)    Activity (Swallow Compensatory Strategies/Techniques Goal 1, SLP)  -- small cup sips   verbal reminder  -ANYA small cup sips  -KB      User Key  (r) = Recorded By, (t) = Taken By, (c) = Cosigned By    Initials Name Provider Type    KB Katherine L Bruton Speech and Language Pathologist          EDUCATION  The patient has been educated in the following areas:   Home Exercise Program (HEP) Dysphagia (Swallowing Impairment).    SLP Recommendation and Plan                                     Plan of Care Reviewed With: patient  IRF Plan of Care Review: progress ongoing, continue, discharge pending  Progress, Functional Goals: progress more gradual than expected  Outcome Summary: Patient with ongoing mild dysarthria and mild oral-phryngeal dysphagia. He has not been compliant with exercise programs prescribed to him, and required encouragement to continue exercises in sessions. Intelligibility has improved, but needs more improvement. He is currently tolerating a mechanical soft diet with NTL. ST is recommended following d/c.       SLP Outcome Measures (last 72 hours)      SLP Outcome Measures     Row Name 07/02/18 1500             SLP Outcome Measures    Outcome Measure Used? Adult NOMS  -KB         FCM Scores    FCM Chosen Swallowing;Motor Speech  -      Swallowing FCM Score 4  -KB      Motor Speech FCM Score 5  -KB        User Key  (r) = Recorded By, (t) = Taken By, (c) = Cosigned By    Initials Name Effective Dates    KB Katherine L Bruton 03/07/18 -              Time Calculation:         Time Calculation- SLP     Row Name 07/03/18 1028             Time Calculation- SLP    SLP Start Time 1000  -KB      SLP Stop Time 1030  -KB      SLP Time Calculation (min) 30 min  -        User Key  (r) = Recorded By, (t) = Taken By, (c) = Cosigned By    Initials Name Provider Type    KB Katherine L Bruton Speech and Language Pathologist          Therapy  Charges for Today     Code Description Service Date Service Provider Modifiers Qty    07252715717 HC ST TREATMENT SWALLOW 4 7/2/2018 Katherine L Bruton GN 1    [id number] HC ST TREATMENT SWALLOW 2 7/3/2018 Katherine L Bruton GN 1               SLP Discharge Summary  Anticipated Dischage Disposition: home with assist, home with OP services  Reason for Discharge: discharge from this facility  Progress Toward Achieving Short/long Term Goals: goals not met within established timelines  Discharge Destination: home w/ assist, home w/ OP services, anticipated therapy at next level of care    Katherine L Bruton  7/3/2018

## 2018-07-04 VITALS
RESPIRATION RATE: 20 BRPM | OXYGEN SATURATION: 96 % | TEMPERATURE: 98.2 F | SYSTOLIC BLOOD PRESSURE: 141 MMHG | DIASTOLIC BLOOD PRESSURE: 63 MMHG | HEART RATE: 84 BPM | BODY MASS INDEX: 27.91 KG/M2 | HEIGHT: 67 IN | WEIGHT: 177.8 LBS

## 2018-07-04 LAB
ANION GAP SERPL CALCULATED.3IONS-SCNC: 13.3 MMOL/L
BASOPHILS # BLD AUTO: 0.02 10*3/MM3 (ref 0–0.2)
BASOPHILS NFR BLD AUTO: 0.2 % (ref 0–1.5)
BUN BLD-MCNC: 28 MG/DL (ref 8–23)
BUN/CREAT SERPL: 22.2 (ref 7–25)
CALCIUM SPEC-SCNC: 8.4 MG/DL (ref 8.6–10.5)
CHLORIDE SERPL-SCNC: 105 MMOL/L (ref 98–107)
CO2 SERPL-SCNC: 21.7 MMOL/L (ref 22–29)
CREAT BLD-MCNC: 1.26 MG/DL (ref 0.76–1.27)
DEPRECATED RDW RBC AUTO: 42 FL (ref 37–54)
EOSINOPHIL # BLD AUTO: 0.37 10*3/MM3 (ref 0–0.7)
EOSINOPHIL NFR BLD AUTO: 4.5 % (ref 0.3–6.2)
ERYTHROCYTE [DISTWIDTH] IN BLOOD BY AUTOMATED COUNT: 12.4 % (ref 11.5–14.5)
GFR SERPL CREATININE-BSD FRML MDRD: 55 ML/MIN/1.73
GLUCOSE BLD-MCNC: 164 MG/DL (ref 65–99)
HCT VFR BLD AUTO: 36.9 % (ref 40.4–52.2)
HGB BLD-MCNC: 12.5 G/DL (ref 13.7–17.6)
IMM GRANULOCYTES # BLD: 0.01 10*3/MM3 (ref 0–0.03)
IMM GRANULOCYTES NFR BLD: 0.1 % (ref 0–0.5)
LYMPHOCYTES # BLD AUTO: 0.62 10*3/MM3 (ref 0.9–4.8)
LYMPHOCYTES NFR BLD AUTO: 7.6 % (ref 19.6–45.3)
MAGNESIUM SERPL-MCNC: 2.4 MG/DL (ref 1.6–2.4)
MCH RBC QN AUTO: 30.9 PG (ref 27–32.7)
MCHC RBC AUTO-ENTMCNC: 33.9 G/DL (ref 32.6–36.4)
MCV RBC AUTO: 91.3 FL (ref 79.8–96.2)
MONOCYTES # BLD AUTO: 0.7 10*3/MM3 (ref 0.2–1.2)
MONOCYTES NFR BLD AUTO: 8.6 % (ref 5–12)
NEUTROPHILS # BLD AUTO: 6.46 10*3/MM3 (ref 1.9–8.1)
NEUTROPHILS NFR BLD AUTO: 79.1 % (ref 42.7–76)
PLATELET # BLD AUTO: 212 10*3/MM3 (ref 140–500)
PMV BLD AUTO: 10.6 FL (ref 6–12)
POTASSIUM BLD-SCNC: 3.4 MMOL/L (ref 3.5–5.2)
RBC # BLD AUTO: 4.04 10*6/MM3 (ref 4.6–6)
SODIUM BLD-SCNC: 140 MMOL/L (ref 136–145)
TSH SERPL DL<=0.05 MIU/L-ACNC: 0.67 MIU/ML (ref 0.27–4.2)
WBC NRBC COR # BLD: 8.17 10*3/MM3 (ref 4.5–10.7)

## 2018-07-04 PROCEDURE — 83735 ASSAY OF MAGNESIUM: CPT | Performed by: PHYSICAL MEDICINE & REHABILITATION

## 2018-07-04 PROCEDURE — 93010 ELECTROCARDIOGRAM REPORT: CPT | Performed by: INTERNAL MEDICINE

## 2018-07-04 PROCEDURE — 85025 COMPLETE CBC W/AUTO DIFF WBC: CPT | Performed by: PHYSICAL MEDICINE & REHABILITATION

## 2018-07-04 PROCEDURE — 84443 ASSAY THYROID STIM HORMONE: CPT | Performed by: PHYSICAL MEDICINE & REHABILITATION

## 2018-07-04 PROCEDURE — 99222 1ST HOSP IP/OBS MODERATE 55: CPT | Performed by: INTERNAL MEDICINE

## 2018-07-04 PROCEDURE — 97535 SELF CARE MNGMENT TRAINING: CPT

## 2018-07-04 PROCEDURE — 80048 BASIC METABOLIC PNL TOTAL CA: CPT | Performed by: PHYSICAL MEDICINE & REHABILITATION

## 2018-07-04 PROCEDURE — 92526 ORAL FUNCTION THERAPY: CPT

## 2018-07-04 PROCEDURE — 97110 THERAPEUTIC EXERCISES: CPT

## 2018-07-04 PROCEDURE — 93005 ELECTROCARDIOGRAM TRACING: CPT | Performed by: PHYSICAL MEDICINE & REHABILITATION

## 2018-07-04 RX ORDER — DABIGATRAN ETEXILATE 150 MG/1
150 CAPSULE ORAL EVERY 12 HOURS SCHEDULED
Qty: 60 CAPSULE | Refills: 1 | Status: SHIPPED | OUTPATIENT
Start: 2018-07-04

## 2018-07-04 RX ORDER — LANOLIN ALCOHOL/MO/W.PET/CERES
3 CREAM (GRAM) TOPICAL NIGHTLY PRN
Start: 2018-07-04

## 2018-07-04 RX ORDER — POTASSIUM CHLORIDE 750 MG/1
20 CAPSULE, EXTENDED RELEASE ORAL ONCE
Status: COMPLETED | OUTPATIENT
Start: 2018-07-04 | End: 2018-07-04

## 2018-07-04 RX ORDER — ATORVASTATIN CALCIUM 40 MG/1
40 TABLET, FILM COATED ORAL NIGHTLY
Qty: 30 TABLET | Refills: 1 | Status: SHIPPED | OUTPATIENT
Start: 2018-07-04

## 2018-07-04 RX ORDER — ACETAMINOPHEN 325 MG/1
650 TABLET ORAL EVERY 6 HOURS PRN
Start: 2018-07-04

## 2018-07-04 RX ORDER — HYDRALAZINE HYDROCHLORIDE 50 MG/1
50 TABLET, FILM COATED ORAL EVERY 8 HOURS SCHEDULED
Qty: 90 TABLET | Refills: 1 | Status: SHIPPED | OUTPATIENT
Start: 2018-07-04

## 2018-07-04 RX ORDER — PAROXETINE 10 MG/1
TABLET, FILM COATED ORAL
Start: 2018-07-04

## 2018-07-04 RX ORDER — AMLODIPINE BESYLATE 10 MG/1
10 TABLET ORAL
Qty: 30 TABLET | Refills: 1 | Status: SHIPPED | OUTPATIENT
Start: 2018-07-04

## 2018-07-04 RX ADMIN — POTASSIUM CHLORIDE 20 MEQ: 750 CAPSULE, EXTENDED RELEASE ORAL at 12:17

## 2018-07-04 RX ADMIN — DABIGATRAN ETEXILATE MESYLATE 150 MG: 150 CAPSULE ORAL at 07:37

## 2018-07-04 RX ADMIN — LEVOTHYROXINE SODIUM 112 MCG: 112 TABLET ORAL at 05:26

## 2018-07-04 RX ADMIN — PANTOPRAZOLE SODIUM 40 MG: 40 TABLET, DELAYED RELEASE ORAL at 05:26

## 2018-07-04 RX ADMIN — Medication 1 TABLET: at 07:36

## 2018-07-04 RX ADMIN — METOPROLOL TARTRATE 25 MG: 25 TABLET ORAL at 07:36

## 2018-07-04 RX ADMIN — CETIRIZINE HYDROCHLORIDE 10 MG: 10 TABLET, FILM COATED ORAL at 07:36

## 2018-07-04 RX ADMIN — HYDRALAZINE HYDROCHLORIDE 50 MG: 50 TABLET, FILM COATED ORAL at 14:07

## 2018-07-04 RX ADMIN — HYDRALAZINE HYDROCHLORIDE 50 MG: 50 TABLET, FILM COATED ORAL at 05:26

## 2018-07-04 RX ADMIN — AMLODIPINE BESYLATE 10 MG: 10 TABLET ORAL at 07:36

## 2018-07-04 RX ADMIN — LISINOPRIL 20 MG: 20 TABLET ORAL at 10:28

## 2018-07-04 RX ADMIN — PAROXETINE HYDROCHLORIDE 10 MG: 10 TABLET, FILM COATED ORAL at 10:28

## 2018-07-04 RX ADMIN — ACETAMINOPHEN 650 MG: 325 TABLET, FILM COATED ORAL at 07:41

## 2018-07-04 NOTE — PROGRESS NOTES
Sounds back in NSR with ectopy - recheck EKG  Renal function improved with Cr 1.26  No fever past 24 hours, Tmax 100.0. No focal complaint. WBC normal. No eosinophilia.   Meds reviewed. Discussed could have fever from hydralazine but as BP improved and back on anticoagulation with history of atrial fib and also recent medulla oblongata hemorrhage when BP was elevated, would not want to discontinue hydralazine at this time.   Look at discharge home today. Would like to get input from Cardiology on meds.

## 2018-07-04 NOTE — CONSULTS
Cardiology Hospital Consult    Patient Name: Martín Souza  Age/Sex: 78 y.o. male  : 1940  MRN: [id number]    Date of Admission: 6/15/2018  Date of Encounter Visit: 18  Encounter Provider: Deni Durant MD  Referring Provider: Danis Mckeon MD  Place of Service: [facility] CARDIOLOGY  Patient Care Team:  Dulce Talley MD as PCP - General (Pediatrics)    Subjective:     Consulted for: Medical regiment for PAF, CAD, hemorrhagic stroke    Chief Complaint: none    History of Present Illness:  Martín Souza is a 78 y.o. male who follows with Dr. Carrasco for his cardiac care.  He has a history of paroxysmal atrial fibrillation and has been on apixaban.  He has a history of coronary artery disease and has been on aspirin and clopidogrel.    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  He has been in rehabilitation for several weeks and is now ready for discharge.  I've been consult to discuss his anticoagulation regimen.    He states that he was seen by neurosurgery after his hemorrhagic stroke and was cleared to resume anticoagulation.  He has not had any angina or bleeding episodes since his stroke.      Past Medical History:  History reviewed. No pertinent past medical history.    History reviewed. No pertinent surgical history.    Home Medications:   Prescriptions Prior to Admission   Medication Sig Dispense Refill Last Dose   • apixaban (ELIQUIS) 5 MG tablet tablet Take 5 mg by mouth.      • atorvastatin (LIPITOR) 80 MG tablet Take 80 mg by mouth Daily.      • levothyroxine (SYNTHROID, LEVOTHROID) 100 MCG tablet Take 100 mcg by mouth Daily.      • lisinopril (PRINIVIL,ZESTRIL) 40 MG tablet Take 40 mg by mouth Daily.      • Multiple Vitamin (MULTI VITAMIN PO) Take  by mouth.      • nitroglycerin (NITROSTAT) 0.4 MG SL tablet Place 0.4 mg under the tongue Every 5 (Five) Minutes As Needed for Chest Pain. Take no more  than 3 doses in 15 minutes.      • omeprazole (priLOSEC) 20 MG capsule Take 20 mg by mouth Daily.      • PARoxetine (PAXIL) 10 MG tablet Take 15 mg by mouth Every Morning. Take 1.5 tablets by mouth every morning      • potassium chloride (K-DUR) 10 MEQ CR tablet Take 10 mEq by mouth 2 (Two) Times a Day With Meals.      • aspirin 81 MG EC tablet Take 81 mg by mouth.      • cetirizine (zyrTEC) 10 MG tablet Take 10 mg by mouth Daily.      • chlorthalidone (HYGROTON) 25 MG tablet Take 25 mg by mouth.      • levothyroxine (SYNTHROID, LEVOTHROID) 112 MCG tablet Take 112 mcg by mouth Daily.      • lisinopril (PRINIVIL,ZESTRIL) 20 MG tablet Take 20 mg by mouth 2 (Two) Times a Day.      • trimethoprim-polymyxin b (POLYTRIM) 51364-0.1 UNIT/ML-% ophthalmic solution           Allergies:  Allergies   Allergen Reactions   • Iodine Shortness Of Breath   • Codeine Itching   • Other Itching     Chromatic sutures       Past Social History:  Social History     Social History   • Marital status:      Social History Main Topics   • Smoking status: Former Smoker     Packs/day: 18.00   • Smokeless tobacco: Never Used   • Drug use: Unknown     Other Topics Concern   • Not on file       Past Family History:  History reviewed. No pertinent family history.    Review of Systems:   All systems reviewed. Pertinent positives identified in HPI. All other systems are negative.    Objective:   Temp:  [98.5 °F (36.9 °C)-100 °F (37.8 °C)] 99.9 °F (37.7 °C)  Heart Rate:  [] 83  Resp:  [18-20] 20  BP: (121-137)/(66-72) 135/67     Intake/Output Summary (Last 24 hours) at 07/04/18 1238  Last data filed at 07/04/18 0746   Gross per 24 hour   Intake              450 ml   Output             1025 ml   Net             -575 ml     Body mass index is 27.85 kg/m².  1    06/16/18 1903   Weight: 80.6 kg (177 lb 12.8 oz) (Bed was zeroed)     Weight change:     Physical Exam:   Gen:  Well appearing, no distress  Neck:  Supple, bengin, no swelling, no  bruits  Lungs:  Clear  Cor:  Regular, S1, S2, no murmur  Abd:  Soft, benign  Ext:  No edema  Neuro:  Some thick speech, otherwise appropriate.        Lab Review:     Results from last 7 days  Lab Units 07/04/18  0728 07/03/18  1613 06/29/18  1919   SODIUM mmol/L 140 135* 137   POTASSIUM mmol/L 3.4* 3.3* 3.3*   CHLORIDE mmol/L 105 99 100   CO2 mmol/L 21.7* 23.1 22.9   BUN mg/dL 28* 30* 20   CREATININE mg/dL 1.26 1.58* 1.36*   GLUCOSE mg/dL 164* 153* 203*   CALCIUM mg/dL 8.4* 8.7 9.0   AST (SGOT) U/L  --  27 46*   ALT (SGPT) U/L  --  71* 127*           Results from last 7 days  Lab Units 07/04/18  0728 07/03/18  1613 07/02/18  1026 06/28/18  1626   WBC 10*3/mm3 8.17 7.96 10.03 7.76   HEMOGLOBIN g/dL 12.5* 13.1* 13.9 13.8   HEMATOCRIT % 36.9* 39.7* 41.1 40.3*   PLATELETS 10*3/mm3 212 179 182 137*           Results from last 7 days  Lab Units 07/04/18  0728 07/03/18  1613   MAGNESIUM mg/dL 2.4 2.3           Invalid input(s): LDLCALC        Results from last 7 days  Lab Units 07/04/18  0728   TSH mIU/mL 0.671       Echo EF Estimated  No results found for: ECHOEFEST    EKG:     Imaging:  Imaging Results (most recent)     Procedure Component Value Units Date/Time    XR Chest PA & Lateral [[id number]] Collected:  07/03/18 1424     Updated:  07/03/18 1431    Narrative:       XR CHEST PA AND LATERAL-     HISTORY: Male who is 78 years-old,  fever     TECHNIQUE: Frontal and lateral views of the chest     COMPARISON: 6/28/2018     FINDINGS: Heart size is normal. Aorta is tortuous. Pulmonary vasculature  is unremarkable. No focal pulmonary consolidation, pleural effusion, or  pneumothorax. No acute osseous process.       Impression:       No evidence for acute pulmonary process. Tortuous aorta.  Follow-up as clinically indicated.     This report was finalized on 7/3/2018 2:28 PM by Dr. Sergey Stern M.D.       FL Video Swallow [[id number]] Collected:  06/30/18 1455     Updated:  06/30/18 1633    Narrative:       VIDEO  SWALLOWING EXAM     HISTORY: Dysphagia.     FINDINGS: There is mild oropharyngeal dysphagia. Mild to moderate  residuals are present with pooling and residuals in the vallecula and  piriform sinuses. There is diminished epiglottic deflection. Mild  pharyngeal residuals are present. Penetration is witnessed.  Recommendations will be made separately by the speech pathologist [see  report].     FLUOROSCOPY TIME: 3 minutes 15 seconds, 9 series of images.     This report was finalized on 6/30/2018 4:29 PM by Dr. Hitesh Hogan M.D.       XR Chest PA & Lateral [[id number]] Collected:  06/28/18 1955     Updated:  06/28/18 1959    Narrative:       PA AND LATERAL CHEST     CLINICAL HISTORY: Dysphagia. Fever.     The lungs are well-expanded and appear free of infiltrates. There are no  pleural effusions. The cardiomediastinal silhouette is unremarkable.     IMPRESSIONS: No evidence of active disease within the chest.     This report was finalized on 6/28/2018 7:56 PM by Dr. Trip Duarte M.D.       CT Head Without Contrast [[id number]] Collected:  06/21/18 0842     Updated:  06/21/18 1619    Narrative:       NONCONTRAST HEAD CT 06/21/2018     CLINICAL HISTORY: Follow-up for right anterior pontomedullary  hemorrhage.     TECHNIQUE: Spiral CT images were obtained from the base of the skull to  the vertex without intravenous contrast. Images were reformatted and  submitted in 3 mm thick axial CT sections. Additional 2 mm thick  sagittal and coronal reconstructions were performed with brain  algorithm.     This is correlated to outside noncontrast head CT Ekmi 10, 2018 as well  as outside MRI of the head and MRV of the dural venous sinuses on  06/09/2018. There are no prior studies from [facility]  for comparison.     FINDINGS: There is some patchy low-density in periventricular extending  into the subcortical white matter of the cerebral hemispheres consistent  with mild-to-moderate small vessel disease. There  is a 17 x 10 mm focal  area of encephalomalacia in the medial left frontoparietal region  consistent with an old infarct in the left anterior cerebral artery  territory. The density of the previously identified acute  intraparenchymal hemorrhage extending from the right anterior velma into  the right anterior medulla has decreased and there is now a chronic  hematoma cavity measuring about 9 x 7 mm in anterior posterior and  medial lateral dimension. There is no surrounding edema and no mass  effect. The etiology of the hemorrhage remains uncertain. The ventricles  are normal in size. There is no midline shift. No extra-axial fluid  collections are identified and no acute intracranial hemorrhage is seen.  The paranasal sinuses and mastoid air cells and middle ear cavities are  clear. Calcified atherosclerotic plaques are present in the cavernous  segments of the internal carotid arteries bilaterally.       Impression:          1. Since outside MRI of the head and MRV of the dural sinuses 06/09/2018  and outside noncontrast head CT 06/10/2018, there have been expected  evolutionary changes in the hemorrhage in the right anterior  pontomedullary junction. The density of the hemorrhage has significantly  decreased and now there is a 9 x 7 x 9 mm chronic hematoma cavity  extending from the right anterior inferior velma into the right anterior  medulla. There has been resolution of surrounding edema. There is no  mass effect. The etiology of the right anterior pontomedullary  hemorrhage remains uncertain. It could be from an underlying cavernous  malformation or hemorrhagic transformation of an arterial or venous  infarct. At some point a follow-up MRI of the head with and without  contrast and MRA/MRV could be obtained to reevaluate and compared to the  outside MRI/MRV 06/09/2018.     2. Mild-to-moderate small vessel disease in the cerebral white matter  and there is a 17 x 10 mm old superior medial left  frontoparietal  infarct in the left anterior cerebral artery territory. The remainder of  the head CT is unremarkable.     Radiation dose reduction techniques were utilized, including automated  exposure control and exposure modulation based on body size.     This report was finalized on 6/21/2018 4:16 PM by Dr. Huey Miller M.D.       FL Video Swallow [[id number]] Collected:  06/20/18 0644     Updated:  06/20/18 0649    Narrative:       Video swallow examination     Clinical: Dysphasia     FLUOROSCOPY TIME: 5 minutes 35 seconds, 2780 images.     FINDINGS: Patient demonstrated a mild oropharyngeal dysphagia  characterized by penetration of thin and mixed consistencies. Mild to  moderate residue with pooling of oral residue to valleculae and pyriform  sinuses. Reduced timing and tongue base retraction. Epiglottic  deflection improved with weight of bolus.     Refer to full speech pathology assessment and recommendations.     This report was finalized on 6/20/2018 6:46 AM by Dr. Nicholas Ware M.D.             I personally viewed and interpreted the patient's EKG    Assessment:     Active Problems:    CVA (cerebral vascular accident) (CMS/[facility])        Plan:     It was a pleasure to see your patient in cardiac evaluation today.  He is a leonides 78-year-old man with history of paroxysmal atrial fibrillation and coronary stenting.  He presents with an acute hemorrhagic stroke and has been cleared to resume anticoagulation by neurosurgery per his report.  He is now ready for discharge from rehabilitation.    I think we should keep him on aspirin 81 mg daily for coronary prevention.  He has significant risk of ischemic stroke due to his atrial fibrillation.  I think continuing his apixaban at 5 mg twice daily is reasonable.  If he has declined in renal function, we can consider decreasing the dose.    He understands that there is some risk of recurrent bleeding on this regimen.  I have asked him to refrain from taking  clopidogrel at this time.  He will follow-up with his primary cardiologist to see if this regimen is appropriate for the long-term.    Thank you for allowing me to participate in the care of Martín Souza. Feel free to contact me directly with any further questions or concerns.    Deni Durant MD  [facility]  07/04/18  12:38 PM

## 2018-07-04 NOTE — DISCHARGE INSTRUCTIONS
No driving. No ETOH.    Diet consistency - [facility] soft, no mixed consistency, nectar thick liquids.     Keep log of temperature 2-3 times a day.  Follow up with Primary Care physician if recurrent fever.     Paxil 10 mg tablet - Take 1  tablets by mouth every morning through July 6, then change to 0.5 mg tablet daily for two weeks, then stop. Plan is to then start Buspar when completes Paxil taper.     Outpatient PT and Speech therapy at [facility].     Lab work:  CMP and lipid panel in one month.

## 2018-07-04 NOTE — PLAN OF CARE
Problem: Patient Care Overview  Goal: Plan of Care Review   07/03/18 2159   Patient Care Overview   IRF Plan of Care Review progress ongoing, continue   Progress, Functional Goals demonstrating adequate progress   OTHER   Outcome Summary low grade 99.8 tonight, HR regular, cardiology didnt come, calls approp, denies pain, skin intact, no coughing, denies pain with urination, bm 7/3/18, ntl, and oral care encouraged   Coping/Psychosocial   Plan of Care Reviewed With patient     Goal: Coping Plan   07/03/18 2159   Coping Plan   Demonstration of Effective Coping Strategies demonstrating adequate progress       Problem: Stroke (Hemorrhagic) (Adult)  Goal: Signs and Symptoms of Listed Potential Problems Will be Absent, Minimized or Managed (Stroke)   07/03/18 2159   Goal/Outcome Evaluation   Problems Assessed (Stroke (Hemorrhagic)) all   Problems Present (Stroke (Hemorrhagic)) none       Problem: Fall Risk (Adult)  Goal: Absence of Fall   07/03/18 2159   Fall Risk (Adult)   Absence of Fall making progress toward outcome       Problem: Dysphagia (Adult)  Goal: Functional/Safe Swallow   07/03/18 2159   Dysphagia (Adult)   Functional/Safe Swallow making progress toward outcome       Problem: Skin Injury Risk (Adult)  Goal: Skin Health and Integrity   07/03/18 2159   Skin Injury Risk (Adult)   Skin Health and Integrity making progress toward outcome

## 2018-07-04 NOTE — DISCHARGE SUMMARY
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 MRI/MRA scans showed 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 Patient participated in therapies at the acute care setting. As of 2016 bed mobility was minimal assistance of 1-2, ambulating 2 feet toward the head of the bed minimal to moderate assistance of 2. Patient needed to cues to attend to his left side. Bed mobility was minimal to moderate assistance of 2. He had poor balance posteriorly and to the left side with standing.      PAST MEDICAL HISTORY:  1. Hearing loss.  2. Seasonal allergies.   3. Hypertension.   4. Coronary artery disease.   5. Paroxysmal atrial fibrillation, for which he was chronically anticoagulated.   6. History of angina.   7. Obstructive sleep apnea for which he uses CPAP.   8. Stage 3 chronic kidney disease.   9. Renal stones.  10. Gastroesophageal reflux disease.   11. Diffuse bruising.   12. Previous CVA.   13. History of head injury, specifics unknown.   14. Hypothyroidism.  15. Depression.      PAST SURGICAL HISTORY:  1. Ankle surgery.  2. Colectomy.   3. Appendectomy.   4. Colonoscopy.   5. Cardiac stents.      REVIEW OF SYSTEMS: Patient denies any headaches. He did have diplopia, but this has resolved. He has a history of GERD symptoms. He denies any vertigo, any syncopal episodes. He denies any shortness of breath, angina, palpitations. He states that his appetite is fair to poor. He has lost a few pounds recently. He states that he is  continent of bowel and bladder.      FAMILY HISTORY: Heart disease, osteoporosis, gastroesophageal reflux disease, restless leg syndrome, CVA.      ALLERGIES:   1. CODEINE.   2. IODINATED DIAGNOSTIC AGENTS.      SOCIAL HISTORY: Patient lives locally in a 2-story patio home with his wife who is available to assist. He does acknowledge drinking 2 glasses of wine per week usually with dinner. He has a history of smoking 2 packs of cigarettes per day but quit in the distant past. Bedroom and bathroom are on the main level. He does have 3-step access from the garage entry level.        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  Follow-up CT of the head on June 21 here with follow-up neurosurgery next day to determine possible resuming anticoagulation  June 25 - Per Dr Mendes, to hold Eliquis for at least two weeks pending f/u CT head and eval by Neuerosurgery , which he had on Friday. Seen by Dr Holland Neurosurgery on June 22 - cleared to resume anticoagulation. Discussed risks and benefits of resuming anticoagulation with patient today. He was cleared by Neurosurgery to resume anticoagulation.  Patient wishes to resume Eliquis.Will plan to start with dose this evening.   June 26 - patient declined Eliquis last PM as  his cardiologist wished to assess options.   Reviewed with Dr Carrasco - Cardiology - today. He has call out to Neurosurgery regarding resuming Eliquis vs change to Pradaxa as has a reversal agent available.     June 28- awaiting decision from Dr. Carrasco  June 30 - Discussed with Dr Carrasco - will start Pradaxa. Patient in agreemant.         Psych- on Paxil chronically - ? Discontinue if resumes oral anticoagulant as can increase risk of bleeding. June 22- CT stable. Anticipate will be resuming Eliquis when sees Neurosurgery. Reviewed with patient. Dr Talley not available today. Will taper off Paxil from 15 mg daily to 10 mg daily for 2 weeks, then 5 mg daily for two  weeks, then stop. Will plan to discuss with PCP other options for anxiety. June 26- discussed with Dr Talley yesterday - plan for starting Buspar once tapers further on Paxil.      Elevated transaminases - June 30 - decreased atorvastatin to 1/2 dose to 40 mg daily. July 3 - ALT and AST improved     Dysarthria     Dysphagia- advanced to [facility] soft,NTL. Add E-stim for dysphagia.  - VFSS 6-29 - no change     Dysphonia     PAF with previous chronic anticoagulation- now on Pradaxa  July 3 - noted irregular on exam today, EKG shows back in atrial fibrillation. Previous EKG June 11 at outside hospital NSR. Looking at graphics may have gone back into atrial fib when had temp yesterday. Outside cardiology clinic note June 6 notes history of difficulty tolerating AV dano blocking meds. On Metoprolol. K=3.3- replace, Mg 2.3. Recheck TSH in AM. Consulted cardiology for input on recurrent atrial fib as well as meds for HTN given increase in creatinine.   July 4 - back in NSR. Cr improved today.     CAD- July 4 - cardiology recommends resuming ASA 91 mg daily, in addition to Pradaxa, with history of CAD. Patient to follow up with his primary cardiologist on the timing on when to resume.      CKD3-  June 30 - Cr 1.39. July 3 - increased to 1.58. Cr was 1.5 mid-May, 1.6 at one point at outside hospital on June 9, but then improved to 1.0. . He was on Lisinopril at 40 mg daily as outpatient per office note June 6. Now on Lisinopril 20 mg bid. Will get input from Cardiology regarding BP meds as well. July 4 - Cr improved to 1.26.      HTN- Admit on amlodipine/lisinopril/metoprolol. June 20 - BP elevated last PM - added hydralazine 10 mg q 6 hours prn SBP> 160 or DBP> 100.  June 21-(has not received any when necessary hydralazine) added scheduled hydralazine 10 mg by mouth every 8 hours.  Has bradycardia pulse in the 50s so did not increase metoprolol.  On amlodipine 10 mg a day and lisinopril 20 mg twice a day. June 22- BP still  elevated with . Increase Hydralazine to 25 mg q 8 hours. June 26 - edema 1+ BLE on amlodipine 10 mg daily. Had edema in past. 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 If BP better control, will decrease amlodipine to 5 mg. Consider diuretic. Will review with Cardiology. June 29 - BP increased when has temp elevation but better than afternoon. Edema better BLE so will note change from Norvasc 10 mg presently. June 30 -Follow BP, may increase hydralazine.  Edema resolved BLE. July 1 - -[phone] this AM. Review additional hydralazine 10 mg now at 10:30 and then increase from 25 mg q 8 hours to 50 mg q 8 hours. July 3 - BP better 125/62 range but back into atrial fib and creatinine increased . Get input from Cardiology on med adjustment. July 4 - Back in sinus rhythm and BP controlled. Discussed could have fever from hydralazine but as BP improved and back on anticoagulation with history of atrial fib and also recent medulla oblongata hemorrhage when BP was elevated, would not want to discontinue hydralazine at this time.      Gout -   controlled with Tylenol - better.      Fever   June 28 -   Fever 101.5 . Nursing reports chills/ feels cold. No pulmonary or dysuria. No significant rash, No recent skin lesion, No recent infections.   Lungs CTA. Heart Mild tachy, regular, AB - NABS,soft, NT. Ext - left first MTP non-tender. Left calf larger than right.   Will check CXR, UA, Blood cultures with fever and chills, CBC.  Doubt gout fever with chills as no pain left first MTP now.  Doubt DVT with c/o chills but LLE larger than right and will check venous duplex.   BLE Venous duplex negative for DVT.  CXR   clear.   UA negative.    Procalcitonin 0.25   Will hold on antibiotics with low procalcitonin.  Could possibly have some pneumonitis as cause with his dysphagia.      June 29 - temp 100.4 this AM but afebrile this afternoon. Will recheck CBC. Will also recheck CMP to assess renal insuff and LFTs on  high dose statin. Blood culture negative at 24 hours. LLE still larger than RLE but noticeable less - may be neurogenic edema.      July 1 - Temp 100.3 last PM, afebrile now. Cough greenish sputum up today, no previous pulm complaints. Reviewed will check viral panel, sputum specimen.  July 2 - viral panel negative. Blood culture negative at 3 days. Temp 101.0 this AM. Suspect represents viral illness. Recheck pro-calcitonin and WBC today - unremarkable. Pro-calcitonin mildly increased at 0.30, WBC 10.3  July 3 - Temp 100.6 last PM. No focal complaint. F/U CXR clear and WBC 7K. Suspect possible viral etiology vs pneumonitis. BC x 4 days.   July 4 - No fever past 24 hours, Tmax 100.0. No focal complaint. WBC normal. No eosinophilia.   Meds reviewed. Discussed could have fever from hydralazine but as BP improved and back on anticoagulation with history of atrial fib and also recent medulla oblongata hemorrhage when BP was elevated, would not want to discontinue hydralazine at this time. Other possibility viral illness vs pneumonitis.  Instructions:  Keep log of temperature 2-3 times a day.  Follow up with Primary Care physician if recurrent fever.         Rehabilitation program.  Physical therapy 1 hour, Occupational therapy 1 hour, speech 1 hour, 5 days a week.  Rehabilitation nursing for carryover monitoring of his cardiac status, neurologic status, bowel bladder and skin.  Ongoing physician follow-up.  Weekly team conferences.        TEAM CONF- June 20 - BED SUP,TRANSFERS CTG. GAIT 160 FEET MIN ASSIT. UBD SET UP. LBD MIN ASSIST.  MILD TO MODERATE DYSARTHRIA. LEFT FACIAL DROP.  DYSPHAGIA- MECH SOFT, NTL. ADD E-STIM  COGNITIIVE SCREEN APPEARS WFL. FURTHER TESTING FOR HIGHER LEVEL. CONTINENT BOWEL AND BLADDER  ELOS- 14 days  Addendum: Patient had fall in bathroom landed on back against the wall. He denies hitting head. Denies any significant back pain, no new numbness or weakness. Tolerates sitting. Order neuro checks  q 4 hours x 2, then q shift.     TEAM CNF - June 27 - ADLS SBA-SET UP. . TRANSFERS CTG. GAIT 240 FEET CTG. 21 STAIRS CTG. DYSPHAGIA - TOLERATES TRIALS OF THIN LIQUIDS. E-STIM FOR DYSPHAGIA. USING STRATEGIES FOR ARTICULATION. CUES FOR SAFETY STRATEGIES WITH NURSING. CONTINENT.  BNE (Active)  Att'n. - WNL  Exec Fx. - WNL  Rsng/Jgmnt - WNL  Arith - Min Imp.  Visuospatial Skills - WNL  Visual Mem. - WNL  Verbal Mem - WNL  Emot - Pt denied dep/anx  ELOS - TUES     July 4 - tolerates mech soft, no mixed consistency, nectar thick liquids.   He has some general fatigue but no change in strength or coordination or swallow. He feels coordination improving. Mild dysarthria. Transfers SBA. Gait SBA/ feet RW, picking objects off floor. LBB CTG. UBD set up. Bathing supervision wit grab bar and hand held shower and supported sitting/standing. .   ---------------------------------------------------------------------------------    July 4 - Look at discharge home today.    Outpatient PT and SLP at [facility]  No driving. No ETOH.  F/u PCP, Neurology, Neurosurgery, Cardiology, PMR    >30 on discharge  -----------------------------------------------------------------------------------     Discharge Medications      New Medications      Instructions Start Date   acetaminophen 325 MG tablet  Commonly known as:  TYLENOL   650 mg, Oral, Every 6 Hours PRN      amLODIPine 10 MG tablet  Commonly known as:  NORVASC  Notes to patient:  7/4/2018 9 AM    10 mg, Oral, Every 24 Hours Scheduled      dabigatran etexilate 150 MG capsu  Commonly known as:  PRADAXA  Notes to patient:  7/4/2018  6 PM    150 mg, Oral, Every 12 Hours Scheduled      hydrALAZINE 50 MG tablet  Commonly known as:  APRESOLINE  Notes to patient:  7/4/2018  9PM    50 mg, Oral, Every 8 Hours Scheduled      melatonin 3 MG tablet   3 mg, Oral, Nightly PRN      metoprolol tartrate 25 MG tablet  Commonly known as:  LOPRESSOR  Notes to patient:  7/4/2018        8 PM    25 mg, Oral,  Every 12 Hours Scheduled         Changes to Medications      Instructions Start Date   atorvastatin 40 MG tablet  Commonly known as:  LIPITOR  What changed:  · medication strength  · how much to take  · when to take this  Notes to patient:  7/4/2018  9PM    40 mg, Oral, Nightly      levothyroxine 112 MCG tablet  Commonly known as:  SYNTHROID, LEVOTHROID  What changed:  Another medication with the same name was removed. Continue taking this medication, and follow the directions you see here.  Notes to patient:  7/5/2018 6 AM    112 mcg, Oral, Daily      lisinopril 20 MG tablet  Commonly known as:  PRINIVIL,ZESTRIL  What changed:  Another medication with the same name was removed. Continue taking this medication, and follow the directions you see here.  Notes to patient:  7/4/2018    9 PM    20 mg, Oral, 2 Times Daily      PARoxetine 10 MG tablet  Commonly known as:  PAXIL  What changed:  · how much to take  · how to take this  · when to take this  · additional instructions  Notes to patient:  7/5/2018    9 AM    Take 1  tablets by mouth every morning through July 6, then change to 0.5 mg tablet daily for two weeks, then stop.         Continue These Medications      Instructions Start Date   cetirizine 10 MG tablet  Commonly known as:  zyrTEC  Notes to patient:  7/5/2018 9 AM    10 mg, Oral, Daily      MULTI VITAMIN PO  Notes to patient:  7/5/2018     9 AM    Oral      nitroglycerin 0.4 MG SL tablet  Commonly known as:  NITROSTAT   0.4 mg, Sublingual, Every 5 Minutes PRN, Take no more than 3 doses in 15 minutes.       omeprazole 20 MG capsule  Commonly known as:  priLOSEC  Notes to patient:  7/5/2018   6 AM    20 mg, Oral, Daily         Stop These Medications    apixaban 5 MG tablet tablet  Commonly known as:  ELIQUIS     aspirin 81 MG EC tablet     chlorthalidone 25 MG tablet  Commonly known as:  HYGROTON     potassium chloride 10 MEQ CR tablet  Commonly known as:  K-DUR     trimethoprim-polymyxin b 10032-2.1  "UNIT/ML-% ophthalmic solution  Commonly known as:  POLYTRIM          Patient to follow up with primary cardiologist on when to add ASA 81 mg.    No driving. No ETOH.    Diet consistency - mech soft, no mixed consistency, nectar thick liquids.     Keep log of temperature 2-3 times a day.  Follow up with Primary Care physician if recurrent fever.     Paxil 10 mg tablet - Take 1  tablets by mouth every morning through July 6, then change to 0.5 mg tablet daily for two weeks, then stop. Plan is to then start Buspar when completes Paxil taper.     Outpatient PT and Speech therapy at [facility].     Lab work:  CMP and lipid panel in one month.     Name Relationship Specialty Phone Fax Address Order            Trip Holland MD   Neurosurgery [phone] [place] [address] [facility] [address]  [facility] [address]    Next Steps: Go on 9/11/2018      Instructions: MRI 9/11 @ 12p.m.   F/U appt 9/19 @ 12:30p.m.       Yadi Coppola   Nurse Practitioner [phone] [facility] [facility] [address]  [place] [address]    Next Steps: Go on 9/6/2018      Instructions: F/U 9/6/18 at 4:30 p.m. [facility]      [facility]    [phone]  [address] [facility] [address]    Next Steps: Go on 7/3/2018      Instructions: MRI with and without contrast 9/11/18 at 12:00 p.m. per Dr. Holland's 6/10/18 [facility] consult and per Dr. Mendes's 6/12/18 progress note (\"to ensure there is no underlying lesion\")      Dulce Talley MD  PCP - General Pediatrics [phone] [facility]  [address] [facility] [address]    Next Steps: Go on 7/12/2018      Instructions: @ 1:00p.m. arrival time 12:45p.m.       Huey Carrasco MD   Cardiac Electrophysiology [phone] [facility]  [address] [facility] [address]  [place] [address]    Next Steps: Go on 8/1/2018      Instructions: @ 1:00p.m. " with Nurse Practitioner       [facility]      [address]  4th floor  [place] [id number]  [phone]    Next Steps: Go on 7/9/2018      Instructions: You are scheduled to start outpatient PT and ST on Monday, 7/9. Please arrive at 9:45 a.m. for 10:00 start time. Your schedule will be Mondays 10:00 for ST and 11:00 for PT and Thursdays 1:45 for PT and 3:00 for ST.      Danis Mckeon MD   Physical Medicine and Rehabilitation [phone] [address]  [place] [address] [place] [address]    Next Steps: Follow up in 3 week(s)                ------------------------------------------------------------------------------------  PHYSICAL EXAM  Mental status-awake alert  Lungs-clear to auscultation  Heart-RRR  Abdomen-normoactive bowel sounds soft and nontender  Extremities-no signficant edema BLE  Neurologic-dysarthria.  Dysmetria with the left upper extremity improved.  Left hemiparesis - improved. Takes resistance on left side well with left EF/FF/hand intrinsics/KE/ADF.      Results Review:       Results from last 7 days  Lab Units 07/04/18  0728 07/03/18  1613 06/29/18  1919   SODIUM mmol/L 140 135* 137   POTASSIUM mmol/L 3.4* 3.3* 3.3*   CHLORIDE mmol/L 105 99 100   CO2 mmol/L 21.7* 23.1 22.9   BUN mg/dL 28* 30* 20   CREATININE mg/dL 1.26 1.58* 1.36*   CALCIUM mg/dL 8.4* 8.7 9.0   BILIRUBIN mg/dL  --  0.5 0.6   ALK PHOS U/L  --  166* 130*   ALT (SGPT) U/L  --  71* 127*   AST (SGOT) U/L  --  27 46*   GLUCOSE mg/dL 164* 153* 203*         Results from last 7 days  Lab Units 07/04/18  0728 07/03/18  1613 07/02/18  1026   WBC 10*3/mm3 8.17 7.96 10.03   HEMOGLOBIN g/dL 12.5* 13.1* 13.9   HEMATOCRIT % 36.9* 39.7* 41.1   PLATELETS 10*3/mm3 212 179 182            VFSS June 18 - FINDINGS: Patient demonstrated a mild oropharyngeal dysphagia  characterized by penetration of thin and mixed consistencies. Mild to  moderate residue with pooling of oral residue to valleculae and  pyriform  sinuses. Reduced timing and tongue base retraction. Epiglottic  deflection improved with weight of bolus.     VFSS June 29 - no change.      -------------------------------------------------------    NONCONTRAST HEAD CT 06/21/2018     CLINICAL HISTORY: Follow-up for right anterior pontomedullary  hemorrhage.     TECHNIQUE: Spiral CT images were obtained from the base of the skull to  the vertex without intravenous contrast. Images were reformatted and  submitted in 3 mm thick axial CT sections. Additional 2 mm thick  sagittal and coronal reconstructions were performed with brain  algorithm.     This is correlated to outside noncontrast head CT Kemi 10, 2018 as well  as outside MRI of the head and MRV of the dural venous sinuses on  06/09/2018. There are no prior studies from [facility]  for comparison.     FINDINGS: There is some patchy low-density in periventricular extending  into the subcortical white matter of the cerebral hemispheres consistent  with mild-to-moderate small vessel disease. There is a 17 x 10 mm focal  area of encephalomalacia in the medial left frontoparietal region  consistent with an old infarct in the left anterior cerebral artery  territory. The density of the previously identified acute  intraparenchymal hemorrhage extending from the right anterior velma into  the right anterior medulla has decreased and there is now a chronic  hematoma cavity measuring about 9 x 7 mm in anterior posterior and  medial lateral dimension. There is no surrounding edema and no mass  effect. The etiology of the hemorrhage remains uncertain. The ventricles  are normal in size. There is no midline shift. No extra-axial fluid  collections are identified and no acute intracranial hemorrhage is seen.  The paranasal sinuses and mastoid air cells and middle ear cavities are  clear. Calcified atherosclerotic plaques are present in the cavernous  segments of the internal carotid arteries  bilaterally.     IMPRESSION:     1. Since outside MRI of the head and MRV of the dural sinuses 06/09/2018  and outside noncontrast head CT 06/10/2018, there have been expected  evolutionary changes in the hemorrhage in the right anterior  pontomedullary junction. The density of the hemorrhage has significantly  decreased and now there is a 9 x 7 x 9 mm chronic hematoma cavity  extending from the right anterior inferior velma into the right anterior  medulla. There has been resolution of surrounding edema. There is no  mass effect. The etiology of the right anterior pontomedullary  hemorrhage remains uncertain. It could be from an underlying cavernous  malformation or hemorrhagic transformation of an arterial or venous  infarct. At some point a follow-up MRI of the head with and without  contrast and MRA/MRV could be obtained to reevaluate and compared to the  outside MRI/MRV 06/09/2018.     2. Mild-to-moderate small vessel disease in the cerebral white matter  and there is a 17 x 10 mm old superior medial left frontoparietal  infarct in the left anterior cerebral artery territory. The remainder of  the head CT is unremarkable.  -----------------------------------------------------------------

## 2018-07-04 NOTE — PLAN OF CARE
Problem: Fall Risk (Adult)  Goal: Absence of Fall  Outcome: Outcome(s) achieved Date Met: 07/04/18

## 2018-07-04 NOTE — PROGRESS NOTES
Inpatient Rehabilitation Functional Measures Assessment    Functional Measures  DAVEY Eating:  Branch  DAVEY Grooming: Branch  DAVEY Bathing:  Branch  DAVEY Upper Body Dressing:  Branch  [facility] Lower Body Dressing:  Branch  DAVEY Toileting:  Toileting Score = 5.  Patient is supervision/set-up for  toileting, requiring: Patient requires the following assistive device(s): Grab  bar.    DAVEY Bladder Management  Level of Assistance:  Bladder Score = 5.  Patient is supervision/set-up for  bladder management, requiring: Stand by assistance. Patient requires the  following assistive device(s):  Urinal.  Frequency/Number of Accidents this Shift:  Bladder accidents this shift:  0 .  Patient has not had an accident this shift.    DAVEY Bowel Management  Level of Assistance: Activity was not observed.  Frequency/Number of Accidents this Shift: Bowel accidents this shift: 0 .  Patient has not had an accident this shift.    DAVEY Bed/Chair/Wheelchair Transfer:  Bed/chair/wheelchair Transfer Score = 5.  Patient is supervision/set-up for transferring to and from the  bed/chair/wheelchair, requiring: Stand by assistance. Patient requires the  following assistive device(s): Bed rails.  DAVEY Toilet Transfer:  Toilet Transfer Score = 5.  Patient is supervision/set-up  for transferring to and from the toilet/commode, requiring: Stand by assistance.  Patient requires the following assistive device(s): Grab bars.  DAVEY Tub/Shower Transfer:  Branch    Previously Documented Mode of Locomotion at Discharge: Field  DAVEY Expected Mode of Locomotion at Discharge: Branch  [facility] Walk/Wheelchair:  Branch  [facility] Stairs:  Branch    [facility] Comprehension:  Branch  DAVEY Expression:  Branch  [facility] Social Interaction:  Branch  [facility] Problem Solving:  Branch  [facility] Memory:  Branch    Therapy Mode Minutes  Occupational Therapy: Branch  Physical Therapy: Branch  Speech Language Pathology:  Branch    Signed by: CA Bay

## 2018-07-04 NOTE — PROGRESS NOTES
Inpatient Rehabilitation Functional Measures Assessment    Functional Measures  DAVEY Eating:  [facility] Grooming: [facility] Bathing:  [facility] Upper Body Dressing:  [facility] Lower Body Dressing:  [facility] Toileting:  [facility] Bladder Management  Level of Assistance:  [place]  Frequency/Number of Accidents this Shift:  [facility] Bowel Management  Level of Assistance: [place]  Frequency/Number of Accidents this Shift: [facility] Bed/Chair/Wheelchair Transfer:  [facility] Toilet Transfer:  [facility] Tub/Shower Transfer:  [place]    Previously Documented Mode of Locomotion at Discharge: Field  DAVEY Expected Mode of Locomotion at Discharge: [facility] Walk/Wheelchair:  [facility] Stairs:  [facility] Comprehension:  Auditory comprehension is the usual mode. Comprehension  Score = 6, Modified [place].  Patient comprehends complex/abstract  information in their primary language, requiring:  DAVEY Expression:  Vocal expression is the usual mode. Expression Score = 6,  Modified Independent.  Patient expresses complex/abstract information in their  primary language, requiring:  DAVEY Social Interaction:  Social Interaction Score = 6, Modified Independent.  Patient is modified independent for social interaction, requiring:  DAVEY Problem Solving:  Problem Solving Score = 6, Modified [place].  Patient  makes appropriate decisions in order to solve complex problems, but requires  extra time.  DAVEY Memory:  Memory Score = 6, Modified [place].  Patient is modified  independent for memory, requiring:    Therapy Mode Minutes  Occupational Therapy: Branch  Physical Therapy: [place]  Speech Language Pathology:  [place]    Signed by: Mei Young RN

## 2018-07-04 NOTE — THERAPY TREATMENT NOTE
Inpatient Rehabilitation - Speech Language Pathology Treatment Note    [facility]       Patient Name: Martín Souza  : 1940  MRN: [id number]    Today's Date: 2018           Admit Date: 6/15/2018      Visit Dx:        ICD-10-CM ICD-9-CM   1. Dysarthria R47.1 784.51       Patient Active Problem List   Diagnosis   • CVA (cerebral vascular accident) (CMS/[facility])          Therapy Treatment    Evaluation/Coping    Evaluation/Treatment Time and Intent  Subjective Information: no complaints (18 1140 : Jenn Gardiner MS CCC-SLP)  Document Type: therapy note (daily note) (18 1140 : Jenn Gardiner MS CCC-SLP)  Mode of Treatment: individual therapy, speech-language pathology (18 1140 : Jenn Gardiner MS CCC-SLP)  Patient/Family Observations: seen bedside- feeling better, but anxious to be d/c home today; wife present (18 1140 : Jenn Gardiner MS CCC-SLP)  Start Time (Evaluation/Treatment): 1100 (18 1140 : Jenn Gardiner MS CCC-SLP)  Stop Time (Evaluation/Treatment): 1130 (18 1140 : Jenn Gardiner MS CCC-SLP)    Vitals/Pain/Safety         Cognition/Communication         Oral Motor/Eating         Mobility/Basic Activities/Instrumental Activities/Motor/Modality                   ROM/MMT                   Sensory/Myotome/Dermatome/Edema               Posture/Balance/Special Tests/Exercise/Transportation/Sexual Function                   Orthotics/Residual Limb/Prosthetic Management              Outcome Summary         EDUCATION    The patient has been educated in the following areas:     Dysphagia (Swallowing Impairment).    SLP Recommendation and Plan                                                                SLP GOALS     Row Name 18 1200 18 1000 18 1400       Oral Nutrition/Hydration Goal 1 (SLP)    Oral Nutrition/Hydration Goal 1, SLP  -- Pt will tolerate recommended diet   -KB Pt will tolerate recommended diet   -KB    Barriers (Oral Nutrition/Hydration Goal 1, SLP) no overt  clinical s/s aspiration noted on ice chips from cup  -SL NMES at 5 mA this session; given trials of thin liquid throughout the session, wet vocal quality was noted inconsistently, and patient was observed to cough x1  -KB NMES for dysphagia tolerated at 6mA, swallows with boluses of TL, NTL completed with NMES; patient was given trials of thin liquid while using NMES for dysphagia; cough on TL noted x2 trials, reduced coughing with reminder to take small sips; tolerated NTL withous overt s/s of pen/asp  -KB       Lingual Strengthening Goal 1 (SLP)    Activity (Lingual Strengthening Goal 1, SLP)  -- increase lingual tone/sensation/control/coordination/movement;increase buccal tension or tone;increase tongue back strength  -KB  --    Increase Lingual Tone/Sensation/Control/Coordination/Movement  -- lingual movement exercises;lingual resistance exercises  -KB  --    Increase Buccal Tension or Tone  -- MOST/swallow strong;swallow trials  -KB  --    Increase Tongue Back Strength lingual movement exercises  -SL lingual movement exercises;swallow trials;lingual resistance exercises  -KB  --    [place]/Accuracy (Lingual Strengthening Goal 1, SLP) independently (over 90% accuracy);with minimal cues (75-90% accuracy)  -SL independently (over 90% accuracy)  -KB  --    Progress/Outcomes (Lingual Strengthening Goal 1, SLP)  -- good progress toward goal  -KB  --       Pharyngeal Strengthening Exercise Goal 1 (SLP)    Activity (Pharyngeal Strengthening Goal 1, SLP) increase superior movement of the hyolaryngeal complex;increase anterior movement of the hyolaryngeal complex;increase tongue base retraction  -SL  --  --    Increase Superior Movement of the Hyolaryngeal Complex falsetto;hard effortful swallow;effortful pitch glide (falsetto + pharyngeal squeeze);joann;breath hold exercises   min cues for pharyngeal tasks  -SL  --  --    Increase Anterior Movement of the Hyolaryngeal Complex falsetto;hard effortful  swallow;effortful pitch glide (falsetto + pharyngeal squeeze);joann;breath hold exercises   tasks completed x 10 each  -SL  --  --    Increase Tongue Base Retraction joann  -SL  --  --       Pharyngeal Strengthening Exercise Goal 2 (SLP)    Increase Timing  --  -- shaker  -KB    Increase Tongue Base Retraction  --  -- shaker  -KB       Swallow Compensatory Strategies Goal 1 (SLP)    Activity (Swallow Compensatory Strategies/Techniques Goal 1, SLP)  --  -- small cup sips   verbal reminder  -KB    Row Name 07/02/18 1000             Oral Nutrition/Hydration Goal 1 (SLP)    Oral Nutrition/Hydration Goal 1, SLP Pt will tolerate recommended diet   -KB      Barriers (Oral Nutrition/Hydration Goal 1, SLP) patient tolerated trials of thin liquid throughout the session without overt s/s of pen/asp  -KB         Lingual Strengthening Goal 1 (SLP)    Activity (Lingual Strengthening Goal 1, SLP) increase lingual tone/sensation/control/coordination/movement;increase buccal tension or tone;increase tongue back strength  -KB      Increase Lingual Tone/Sensation/Control/Coordination/Movement lingual movement exercises;lingual resistance exercises  -KB      Increase Buccal Tension or Tone MOST/swallow strong;swallow trials  -KB      Increase Tongue Back Strength lingual movement exercises;swallow trials;lingual resistance exercises  -KB      [place]/Accuracy (Lingual Strengthening Goal 1, SLP) with minimal cues (75-90% accuracy)  -KB      Progress/Outcomes (Lingual Strengthening Goal 1, SLP) goal ongoing  -KB         Pharyngeal Strengthening Exercise Goal 2 (SLP)    Increase Timing Mendelsohn;falsetto;hard effortful swallow;effortful pitch glide (falsetto + pharyngeal squeeze);chin tuck against resistance (CTAR);super-supraglottic swallow;joann  -KB      Increase Tongue Base Retraction hard effortful swallow;joann  -KB         Swallow Compensatory Strategies Goal 1 (SLP)    Activity (Swallow Compensatory Strategies/Techniques  Goal 1, SLP) small cup sips  -KB        User Key  (r) = Recorded By, (t) = Taken By, (c) = Cosigned By    Initials Name Provider Type    ANNIE Gardiner MS CCC-SLP Speech and Language Pathologist    KB Katherine L Bruton Speech and Language Pathologist               SLP Outcome Measures (last 72 hours)      SLP Outcome Measures     Row Name 07/02/18 1500             SLP Outcome Measures    Outcome Measure Used? Adult NOMS  -KB         FCM Scores    FCM Chosen Swallowing;Motor Speech  -KB      Swallowing FCM Score 4  -KB      Motor Speech FCM Score 5  -KB        User Key  (r) = Recorded By, (t) = Taken By, (c) = Cosigned By    Initials Name Effective Dates    KB Katherine L Bruton 03/07/18 -               Time Calculation:             Time Calculation- SLP     Row Name 07/04/18 1207             Time Calculation- SLP    SLP Start Time 1100  -      SLP Stop Time 1130  -      SLP Time Calculation (min) 30 min  -        User Key  (r) = Recorded By, (t) = Taken By, (c) = Cosigned By    Initials Name Provider Type    ANNIE Gardiner MS CCC-SLP Speech and Language Pathologist            Therapy Charges for Today     Code Description Service Date Service Provider Modifiers Qty    [id number] HC ST TREATMENT SWALLOW 2 7/4/2018 Jenn Gardiner MS CCC-SLP GN 1               ADULT NOMS (last 72 hours)      Adult NOMS     Row Name 07/02/18 1500                   FCM Scores    FCM Chosen Swallowing;Motor Speech  -KB        Swallowing FCM Score 4  -KB        Motor Speech FCM Score 5  -KB          User Key  (r) = Recorded By, (t) = Taken By, (c) = Cosigned By    Initials Name Effective Dates    KB Katherine L Bruton 03/07/18 -                    Jenn Gardiner MS CCC-SLP  7/4/2018

## 2018-07-04 NOTE — PROGRESS NOTES
Inpatient Rehabilitation Functional Measures Assessment and Plan of Care    Plan of Care  Updated Problems/Interventions  Field    Functional Measures  DAVEY Eating:  [place]  DAVEY Grooming: [facility] Bathing:  [facility] Upper Body Dressing:  [facility] Lower Body Dressing:  [facility] Toileting:  [facility] Bladder Management  Level of Assistance:  [place]  Frequency/Number of Accidents this Shift:  [facility] Bowel Management  Level of Assistance: [place]  Frequency/Number of Accidents this Shift: [facility] Bed/Chair/Wheelchair Transfer:  Activity was not observed.  DAVEY Toilet Transfer:  [facility] Tub/Shower Transfer:  [place]    Previously Documented Mode of Locomotion at Discharge: Field  DAVEY Expected Mode of Locomotion at Discharge: [facility] Walk/Wheelchair:  WHEELCHAIR OBSERVATION   Activity was not observed.    WALK OBSERVATION   Walk Distance Scale = 3.  Distance walked is greater than 150 feet. Walk Score  = 4.  Patient performs 75% or more of effort and requires minimal assistance.  Incidental help/contact guard/steadying was provided. Patient walked a distance  of  160 feet. Patient requires the following assistive device(s): Rolling  walker.  DAVEY Stairs:  Stairs Score = 2.  Incidental assistance with lifting or lowering,  contact guard or steadying was provided. Patient performs 75% or more of effort  and requires minimal contact assistance. Patient negotiated  8 stairs. Patient  requires the following assistive device(s): Handrail(s).    DAVEY Comprehension:  [facility] Expression:  [facility] Social Interaction:  [facility] Problem Solving:  [facility] Memory:  [place]    Therapy Mode Minutes  Occupational Therapy: [place]  Physical Therapy: Individual: 30 minutes.  Speech Language Pathology:  [place]    Signed by: Chey Owens PTA

## 2018-07-04 NOTE — PROGRESS NOTES
Inpatient Rehabilitation Plan of Care Note    Plan of Care  Care Plan Reviewed - No updates at this time.    Field    Signed by: Mei Young RN

## 2018-07-04 NOTE — PROGRESS NOTES
Inpatient Rehabilitation Functional Measures Assessment and Plan of Care    Plan of Care  Updated Problems/Interventions  Field    Functional Measures  DAVEY Eating:  Eating Score = 7. Patient is completely independent for eating.  There are no activity limitations.  DAVEY Grooming: Grooming Score = 5. Patient is supervision/set-up for grooming,  requiring: Stand by assistance. No assistive devices were required.  DAVEY Bathing:  Patient bathed in shower. Bathing Score = 5.  Patient is  supervision/set-up for bathing, requiring: Standing by. Patient requires the  following assistive device(s): Grab bar/arm rest to maintain balance. Hand held  shower.  DAVEY Upper Body Dressing:  Upper Body Dressing Score = 5. Patient is supervision  for upper body dressing, requiring: Stand by assistance. No assistive devices  were required.  DAVEY Lower Body Dressing:  Lower Body Dressing Score = 4. Patient requires  minimal assistance for lower body dressing, requiring incidental help only (such  as task initiation or assist with buttons/zips/snaps). No assistive devices were  required.  DAVEY Toileting:  Toileting Score = 4.  Patient requires minimal assistance for  toileting, such as steadying for balance while cleansing or adjusting clothes.  Patient requires the following assistive device(s): Adaptive device to maintain  balance.    DAVEY Bladder Management  Level of Assistance:  [facility]  Frequency/Number of Accidents this Shift:  [facility]anch    [facility] Bowel Management  Level of Assistance: [facility]  Frequency/Number of Accidents this Shift: [facility]    [facility] Bed/Chair/Wheelchair Transfer:  [facility]  [facility] Toilet Transfer:  Toilet Transfer Score = 4.  Patient performs 75% or more  of effort and minimal assistance (little/incidental help/steadying) for  transferring to and from the toilet/commode, requiring: Steadying. Patient  requires the following assistive device(s): Walker. Safety frame/over the  toilet.  DAVEY Tub/Shower Transfer:  Shower Transfer Score =  4. Patient performs 75% or  more of effort and minimal assistance (little/incidental help/lifting of one  limb/steadying) for transferring to and from the shower, requiring: Steadying.  Patient requires the following assistive device(s): Walker. Shower chair.    Previously Documented Mode of Locomotion at Discharge: Field  DAVEY Expected Mode of Locomotion at Discharge: [facility] Walk/Wheelchair:  [facility] Stairs:  [facility] Comprehension:  [facility] Expression:  [facility] Social Interaction:  [facility] Problem Solving:  [facility] Memory:  [place]    Therapy Mode Minutes  Occupational Therapy: Individual: 30 minutes.  Physical Therapy: [facility]  Speech Language Pathology:  [place]    Signed by: Trip Og OTR/L

## 2018-07-04 NOTE — THERAPY TREATMENT NOTE
Inpatient Rehabilitation - Occupational Therapy Treatment Note    [facility]     Patient Name: Martín Souza  : 1940  MRN: [id number]    Today's Date: 2018                 Admit Date: 6/15/2018      Visit Dx:    ICD-10-CM ICD-9-CM   1. Dysarthria R47.1 784.51       Patient Active Problem List   Diagnosis   • CVA (cerebral vascular accident) (CMS/HCC)         Therapy Treatment          IRF Treatment Summary     Row Name 18 1239 18 1140 18 0946       Evaluation/Treatment Time and Intent    Subjective Information complains of;weakness  -DN no complaints  -SL complains of;weakness  -LB    Document Type therapy note (daily note)  -DN therapy note (daily note)  -SL therapy note (daily note)  -LB    Mode of Treatment occupational therapy  -DN individual therapy;speech-language pathology  -SL physical therapy  -LB    Patient/Family Observations pt supine in bed  -DN seen bedside- feeling better, but anxious to be d/c home today; wife present  -SL seated in w/c. Wife present  -LB    Start Time (Evaluation/Treatment)  -- 1100  -SL  --    Stop Time (Evaluation/Treatment)  -- 1130  -SL  --    Recorded by [DN] GRACIELA Garcia [SL] Jenn Gardiner MS CCC-SLP [LB] Chey Owens PTA    Row Name 18 1239             Cognition/Psychosocial- PT/OT    Affect/Mental Status (Cognitive) WFL  -DN      Orientation Status (Cognition) oriented x 4  -DN      Follows Commands (Cognition) follows one step commands;over 90% accuracy  -DN      Personal Safety Interventions fall prevention program maintained;gait belt  -DN      Cognitive Function (Cognitive) safety deficit  -DN      Safety Deficit (Cognitive) mild deficit  -DN      Recorded by [DN] GRACIELA Garcia      Row Name 18 0946             Bed Mobility Assessment/Treatment    Comment (Bed Mobility) up un w/c  -LB      Recorded by [LB] Chey Owens PTA      Row Name 18 1239             Bed-Chair Transfer    Bed-Chair  [place] (Transfers) supervision  -DN      Assistive Device (Bed-Chair Transfers) wheelchair  -DN      Recorded by [DN] GRACIELA Garcia      Row Name 07/04/18 0946             Sit-Stand Transfer    Sit-Stand [place] (Transfers) stand by assist  -LB      Assistive Device (Sit-Stand Transfers) walker, front-wheeled  -LB      Recorded by [LB] Chey Owens PTA      Row Name 07/04/18 0946             Stand-Sit Transfer    Stand-Sit [place] (Transfers) stand by assist  -LB      Assistive Device (Stand-Sit Transfers) walker, front-wheeled  -LB      Recorded by [LB] Chey Owens PTA      Row Name 07/04/18 1239             Toilet Transfer    Type (Toilet Transfer) stand pivot/stand step  -DN      [place] Level (Toilet Transfer) contact guard;verbal cues  -DN      Assistive Device (Toilet Transfer) commode, 3-in-1;grab bars/safety frame  -DN      Recorded by [DN] GRACIELA Garcia      Row Name 07/04/18 1239             Shower Transfer    [place] Level (Shower Transfer) supervision;verbal cues  -DN      Assistive Device (Shower Transfer) wheelchair  -DN      Recorded by [DN] GRACIELA Garcia      Row Name 07/04/18 0946             Gait/Stairs Assessment/Training    [place] Level (Gait) contact guard;stand by assist  -LB      Assistive Device (Gait) walker, front-wheeled  -LB      Distance in Feet (Gait) 160  -LB      Deviations/Abnormal Patterns (Gait) base of support, narrow  -LB      Bilateral Gait Deviations forward flexed posture  -LB      Left Sided Gait Deviations heel strike decreased  -LB      [place] Level (Stairs) contact guard  -LB      Handrail Location (Stairs) right side (ascending)  -LB      Number of Steps (Stairs) 8  -LB      Ascending Technique (Stairs) step-over-step  -LB      Descending Technique (Stairs) step-over-step  -LB      Recorded by [LB] Chey Owens PTA      Row Name 07/04/18 1239             Bathing Assessment/Treatment    Bathing  [place] Level bathing skills;supervision  -DN      Assistive Device (Bathing) shower chair;grab bar/tub rail;hand held shower spray hose  -DN      Bathing Position supported sitting;supported standing  -DN      Recorded by [DN] GRACIELA Garcia      Row Name 07/04/18 1239             Upper Body Dressing Assessment/Treatment    Upper Body Dressing Task upper body dressing skills;set up assistance  -DN      Upper Body Dressing Position supported sitting  -DN      Set-up Assistance (Upper Body Dressing) obtain clothing  -DN      Recorded by [DN] GRACIELA Garcia      Row Name 07/04/18 1239             Lower Body Dressing Assessment/Treatment    Lower Body Dressing [place] Level don;pants/bottoms;shoes/slippers;socks;contact guard assist  -DN      Lower Body Dressing Position supported sitting;supported standing  -DN      Lower Body Dressing Setup Assistance obtain clothing  -DN      Recorded by [DN] GRACIELA Garcia      Row Name 07/04/18 1239             Grooming Assessment/Treatment    Grooming [place] Level grooming skills;deodorant application;oral care regimen;set up  -DN      Grooming Position sink side;supported sitting  -DN      Recorded by [DN] GRACIELA Garcia      Row Name 07/04/18 1239             Toileting Assessment/Treatment    Toileting [place] Level toileting skills;contact guard assist  -DN      Assistive Device Use (Toileting) grab bar/safety frame;raised toilet seat  -DN      Toileting Position supported standing;unsupported standing  -DN      Recorded by [DN] GRACIELA Garcia      Row Name 07/04/18 0946             Pain Scale: Numbers Pre/Post-Treatment    Pain Scale: Numbers, Pretreatment 0/10 - no pain  -LB      Recorded by [LB] Chey Owens PTA      Row Name 07/04/18 0946             Dynamic Balance Activity    Therapeutic Training Performed (Dynamic Balance) --   amb 12 over uneven surface, rwx, CGA  -LB      Recorded by [LB] ERIKA Yates  Name 07/04/18 1239 07/04/18 0946          Positioning and Restraints    Pre-Treatment Position in bed  -DN  --     Post Treatment Position wheelchair  -DN wheelchair  -LB     In Bed call light within reach;encouraged to call for assist;exit alarm on  -DN  --     Other Position  -- return to room with caregiver  -LB     Recorded by [DN] Trip Og, OTR [LB] Chey Owens, ERIKA       User Key  (r) = Recorded By, (t) = Taken By, (c) = Cosigned By    Initials Name Effective Dates    SL Jenn Zuleyka, MS CCC-SLP 06/08/18 -     DN Trip Og, OTR 06/08/18 -     LB Chey Owens PTA 03/07/18 -                  OT Recommendation and Plan    Anticipated Discharge Disposition (OT): home with assist       Daily Summary of Progress (OT)  Functional Goal Overall Progress: Occupational Therapy: progressing towards functional goals slower than expected  Impairments Continuing to Limit Function: Occupational Therapy: pt is weak from illness which had halted d/c today and has made pt unstable at times due to overall weakness  Recommendations: Occupational Therapy: will alter goals for d,c home when appropriate          OT IRF GOALS     Row Name 07/03/18 0911 07/02/18 1400 06/25/18 1200       Transfer Goal 1 (OT-IRF)    Activity/Assistive Device (Transfer Goal 1, OT-IRF) toilet;shower chair;walk-in shower;walker, rolling  -DN toilet;shower chair;walk-in shower;walker, rolling  -DN toilet;shower chair;walk-in shower;walker, rolling  -DN    [place] Level (Transfer Goal 1, OT-IRF) supervision required;verbal cues required  -DN supervision required;verbal cues required  -DN supervision required;verbal cues required  -DN    Time Frame (Transfer Goal 1, OT-IRF) short term goal (STG)  -DN  -- short term goal (STG)  -DN    Barriers (Transfer Goal 1, OT-IRF)  -- pt ill last couple of days   -DN  --    Progress/Outcomes (Transfer Goal 1, OT-IRF) medical status inhibiting progress;goal ongoing  -DN goal not met  -DN good progress  toward goal;goal revised this date;goal ongoing  -DN       Transfer Goal 2 (OT-IRF)    Activity/Assistive Device (Transfer Goal 2, OT-IRF) toilet;shower chair;walk-in shower;walker, rolling  -DN toilet;shower chair;walk-in shower;walker, rolling  -DN toilet;shower chair;walk-in shower;walker, rolling  -DN    [place] Level (Transfer Goal 2, OT-IRF) supervision required;verbal cues required  -DN supervision required;verbal cues required  -DN supervision required;verbal cues required  -DN    Time Frame (Transfer Goal 2, OT-IRF) long term goal (LTG)  -DN  -- long term goal (LTG)  -DN    Progress/Outcomes (Transfer Goal 2, OT-IRF) medical status inhibiting progress;goal ongoing  -DN goal not met  -DN goal revised this date;goal ongoing  -DN       Bathing Goal 1 (OT-IRF)    Activity/Device (Bathing Goal 1, OT-IRF) bathing skills, all;grab bar/tub rail;hand-held shower spray hose;shower chair  -DN bathing skills, all;grab bar/tub rail;hand-held shower spray hose;shower chair  -DN bathing skills, all;grab bar/tub rail;hand-held shower spray hose;shower chair  -DN    [place] Level (Bathing Goal 1, OT-IRF) supervision required;verbal cues required  -DN  -- supervision required;verbal cues required  -DN    Time Frame (Bathing Goal 1, OT-IRF) short term goal (STG)  -DN  -- short term goal (STG)  -DN    Progress/Outcomes (Bathing Goal 1, OT-IRF) goal met  -DN goal met;goal revised this date  -DN goal met;goal revised this date  -DN       Bathing Goal 2 (OT-IRF)    Activity/Device (Bathing Goal 2, OT-IRF)  -- bathing skills, all;grab bar/tub rail;hand-held shower spray hose;shower chair  -DN bathing skills, all;grab bar/tub rail;hand-held shower spray hose;shower chair  -DN    [place] Level (Bathing Goal 2, OT-IRF) supervision required  -DN  -- supervision required  -DN    Time Frame (Bathing Goal 2, OT-IRF) long term goal (LTG);by discharge  -DN  -- long term goal (LTG);by discharge  -DN    Progress/Outcomes  (Bathing Goal 2, OT-IRF) goal met  -DN goal not met  -DN goal ongoing  -DN       UB Dressing Goal 1 (OT-IRF)    Activity/Device (UB Dressing Goal 1, OT-IRF) upper body dressing  -DN upper body dressing  -DN upper body dressing  -DN    [place] (UB Dress Goal 1, OT-IRF) supervision required  -DN  -- supervision required  -DN    Time Frame (UB Dressing Goal 1, OT-IRF) short term goal (STG)  -DN  -- short term goal (STG)  -DN    Progress/Outcomes (UB Dressing Goal 1, OT-IRF) goal met  -DN goal met  -DN goal met;goal ongoing  -DN       UB Dressing Goal 2 (OT-IRF)    Activity/Device (UB Dressing Goal 2, OT-IRF) upper body dressing  -DN upper body dressing  -DN upper body dressing  -DN    [place] (UB Dress Goal 2, OT-IRF) supervision required  -DN  -- supervision required  -DN    Time Frame (UB Dressing Goal 2, OT-IRF) long term goal (LTG)  -DN  -- long term goal (LTG)  -DN    Progress/Outcomes (UB Dressing Goal 2, OT-IRF) goal met  -DN goal met  -DN goal ongoing  -DN       LB Dressing Goal 1 (OT-IRF)    Activity/Device (LB Dressing Goal 1, OT-IRF) lower body dressing  -DN lower body dressing  -DN lower body dressing  -DN    [place] (LB Dressing Goal 1, OT-IRF) supervision required  -DN  -- contact guard assist;supervision required  -DN    Time Frame (LB Dressing Goal 1, OT-IRF) short term goal (STG)  -DN  -- short term goal (STG)  -DN    Progress/Outcomes (LB Dressing Goal 1, OT-IRF) medical status inhibiting progress;goal ongoing  -DN goal met  -DN goal met;goal revised this date  -DN       LB Dressing Goal 2 (OT-IRF)    Activity/Device (LB Dressing Goal 2, OT-IRF) lower body dressing  -DN lower body dressing  -DN lower body dressing  -DN    [place] (LB Dressing Goal 2, OT-IRF) supervision required  -DN  -- supervision required  -DN    Time Frame (LB Dressing Goal 2, OT-IRF) long term goal (LTG)  -DN  -- long term goal (LTG)  -DN    Progress/Outcomes (LB Dressing Goal 2, OT-IRF) medical status  inhibiting progress;goal ongoing  -DN goal met  -DN goal ongoing  -DN       Toileting Goal 1 (OT-IRF)    Activity/Device (Toileting Goal 1, OT-IRF) toileting skills, all;perform perineal hygiene;adjust/manage clothing;grab bar/safety frame  -DN toileting skills, all;perform perineal hygiene;adjust/manage clothing;grab bar/safety frame  -DN toileting skills, all;perform perineal hygiene;adjust/manage clothing;grab bar/safety frame  -DN    [place] Level (Toileting Goal 1, OT-IRF) supervision required  -DN  -- contact guard assist  -DN    Time Frame (Toileting Goal 1, OT-IRF)  --  -- short term goal (STG)  -DN    Progress/Outcomes (Toileting Goal 1, OT-IRF) medical status inhibiting progress;goal ongoing  -DN goal met  -DN goal met;goal revised this date  -DN       Toileting Goal 2 (OT-IRF)    Activity/Device (Toileting Goal 2, OT-IRF)  -- toileting skills, all;adjust/manage clothing;perform perineal hygiene  -DN toileting skills, all;adjust/manage clothing;perform perineal hygiene  -DN    [place] Level (Toileting Goal 2, OT-IRF) supervision required;verbal cues required  -DN  -- supervision required;verbal cues required  -DN    Time Frame (Toileting Goal 2, OT-IRF) long term goal (LTG)  -DN  -- long term goal (LTG)  -DN    Progress/Outcomes (Toileting Goal 2, OT-IRF) medical status inhibiting progress;goal ongoing  -DN goal met  -DN goal ongoing  -DN       Strength Goal 1 (OT-IRF)    Strength Goal 1 (OT-IRF) Pt to increase LUE strength to 4-/5 to assist with ADLs.  -DN Pt to increase LUE strength to 4-/5 to assist with ADLs.  -DN Pt to increase LUE strength to 4-/5 to assist with ADLs.  -DN    Time Frame (Strength Goal 1, OT-IRF) short term goal (STG);1 week  -DN  -- short term goal (STG);1 week  -DN    Progress/Outcomes (Strength Goal 1, OT-IRF) goal met  -DN goal met  -DN goal met;goal revised this date  -DN       Strength Goal 2 (OT-IRF)    Strength Goal 2 (OT-IRF) Pt to increase LUE strength to 4/5 to  assist with ADL.  -DN  -- Pt to increase LUE strength to 4/5 to assist with ADL.  -DN    Time Frame (Strength Goal 2, OT-IRF) long term goal (LTG);by discharge  -DN long term goal (LTG);by discharge  -DN long term goal (LTG);by discharge  -DN    Progress/Outcomes (Strength Goal 2, OT-IRF) goal met  -DN goal met  -DN goal ongoing  -DN       Balance Goal 1 (OT)    Activity/Assistive Device (Balance Goal 1, OT) standing, static;standing, dynamic  -DN standing, static;standing, dynamic  -DN standing, static;standing, dynamic  -DN    [place] Level/Cues Needed (Balance Goal 1, OT) supervision required  -DN  -- supervision required  -DN    Time Frame (Balance Goal 1, OT) long term goal (LTG);by discharge  -DN  -- long term goal (LTG);by discharge  -DN    Progress/Outcomes (Balance Goal 1, OT) medical status inhibiting progress;goal ongoing  -DN goal met  -DN goal ongoing  -DN       Caregiver Training Goal 1 (OT-IRF)    Caregiver Training Goal 1 (OT-IRF) Pt and family to be indep with HEP, AD/AE, ADLs, functional transfers and IADL tasks upon d/c isamar.  -DN Pt and family to be indep with HEP, AD/AE, ADLs, functional transfers and IADL tasks upon d/c isamar.  -DN Pt and family to be indep with HEP, AD/AE, ADLs, functional transfers and IADL tasks upon d/c isamar.  -DN    Time Frame (Caregiver Training Goal 1, OT-IRF) long term goal (LTG);by discharge  -DN  -- long term goal (LTG);by discharge  -DN    Progress/Outcomes (Caregiver Training Goal 1, OT-IRF) goal met  -DN goal met  -DN goal ongoing;goal not met  -DN      User Key  (r) = Recorded By, (t) = Taken By, (c) = Cosigned By    Initials Name Provider Type    GRACIELA Bocanegra Occupational Therapist                Outcome Measures     Row Name 07/02/18 1400             Modified Ryan Scale    Modified Ryan Scale 3 - Moderate disability.  Requiring some help, but able to walk without assistance.  -DN         Functional Assessment    Outcome Measure Options Modified  [place]  -ELIAS        User Key  (r) = Recorded By, (t) = Taken By, (c) = Cosigned By    Initials Name Provider Type    GRACIELA Bocanegra Occupational Therapist             Time Calculation:           Time Calculation- OT     Row Name 07/04/18 1246             Time Calculation- OT    OT Start Time 0830  -DN      OT Stop Time 0900  -DN      OT Time Calculation (min) 30 min  -DN      OT Received On 07/04/18  -DN        User Key  (r) = Recorded By, (t) = Taken By, (c) = Cosigned By    Initials Name Provider Type    GRACIELA Bocanegra Occupational Therapist          Therapy Suggested Charges     Code   Minutes Charges    None              Therapy Charges for Today     Code Description Service Date Service Provider Modifiers Qty    16082522478 HC OT SELF CARE/MGMT/TRAIN EA 15 MIN 7/3/2018 GRACIELA Garcia GO 2    32222882534 HC OT THER PROC EA 15 MIN 7/3/2018 GRACIELA Garcia GO 2    24031448796 HC OT SELF CARE/MGMT/TRAIN EA 15 MIN 7/4/2018 GRACIELA Garcia GO 2                   GRACIELA Garcia  7/4/2018

## 2018-07-04 NOTE — PROGRESS NOTES
Occupational Therapy: Branch    Physical Therapy: Branch    Speech Language Pathology:  Individual: 30 minutes.    Signed by: Jenn Gardiner SLP

## 2018-07-04 NOTE — PROGRESS NOTES
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   Dysarthria  Dysphagia  Dysphonia  PAF with previous chronic anticoagulation- resumed with Pradaxa June 30  CKD3  HTN  S/p Fever - possible viral vs pneumonitis vs med side effect       Subjective     History of Present Illness    Subjective   He has some general fatigue but no change in strength or coordination or swallow. He feels coordination improving. Transfers SBA. Gait SBA/ feet RW, picking objects off floor. LBB CTG. UBD set up. Bathing supervision wit grab bar and hand held shower and supported sitting/standing. .   Will get hiccups when swallow pills.  He feels ready for home. No new complaints.  No fever past 24 hours.       History taken from: patient    Objective     Vital Signs  Temp:  [98.5 °F (36.9 °C)-100 °F (37.8 °C)] 99.9 °F (37.7 °C)  Heart Rate:  [] 83  Resp:  [18-20] 20  BP: (121-137)/(66-72) 135/67    Objective:  Vital signs: (most recent): Blood pressure 123/71, pulse 84, temperature 98.7 °F (37.1 °C), temperature source Oral, resp. rate 18, height 170.2 cm (67\"), weight 80.6 kg (177 lb 12.8 oz), SpO2 96 %.          PHYSICAL EXAM  Mental status-awake alert  Lungs-clear to auscultation  Heart-RRR  Abdomen-normoactive bowel sounds soft and nontender  Extremities-no signficant edema BLE  Neurologic-dysarthria.  Dysmetria with the left upper extremity improved.  Left hemiparesis - improved. Takes resistance on left side well with left EF/FF/hand intrinsics/KE/ADF.     Results Review:     I reviewed the patient's new clinical results.       Results from last 7 days  Lab Units 07/04/18  0728 07/03/18  1613 06/29/18  1919   SODIUM mmol/L 140 135* 137   POTASSIUM mmol/L 3.4* 3.3* 3.3*   CHLORIDE mmol/L 105 99 100   CO2 mmol/L 21.7* 23.1 22.9   BUN mg/dL 28* 30* 20   CREATININE mg/dL 1.26 1.58* 1.36*   CALCIUM mg/dL 8.4* 8.7 9.0 "   BILIRUBIN mg/dL  --  0.5 0.6   ALK PHOS U/L  --  166* 130*   ALT (SGPT) U/L  --  71* 127*   AST (SGOT) U/L  --  27 46*   GLUCOSE mg/dL 164* 153* 203*       Results from last 7 days  Lab Units 07/04/18  0728 07/03/18  1613 07/02/18  1026   WBC 10*3/mm3 8.17 7.96 10.03   HEMOGLOBIN g/dL 12.5* 13.1* 13.9   HEMATOCRIT % 36.9* 39.7* 41.1   PLATELETS 10*3/mm3 212 179 182         VFSS June 18 - FINDINGS: Patient demonstrated a mild oropharyngeal dysphagia  characterized by penetration of thin and mixed consistencies. Mild to  moderate residue with pooling of oral residue to valleculae and pyriform  sinuses. Reduced timing and tongue base retraction. Epiglottic  deflection improved with weight of bolus.    VFSS June 29 - no change.     -------------------------------------------------------    NONCONTRAST HEAD CT 06/21/2018     CLINICAL HISTORY: Follow-up for right anterior pontomedullary  hemorrhage.     TECHNIQUE: Spiral CT images were obtained from the base of the skull to  the vertex without intravenous contrast. Images were reformatted and  submitted in 3 mm thick axial CT sections. Additional 2 mm thick  sagittal and coronal reconstructions were performed with brain  algorithm.     This is correlated to outside noncontrast head CT Kemi 10, 2018 as well  as outside MRI of the head and MRV of the dural venous sinuses on  06/09/2018. There are no prior studies from [facility]  for comparison.     FINDINGS: There is some patchy low-density in periventricular extending  into the subcortical white matter of the cerebral hemispheres consistent  with mild-to-moderate small vessel disease. There is a 17 x 10 mm focal  area of encephalomalacia in the medial left frontoparietal region  consistent with an old infarct in the left anterior cerebral artery  territory. The density of the previously identified acute  intraparenchymal hemorrhage extending from the right anterior velma into  the right anterior medulla  has decreased and there is now a chronic  hematoma cavity measuring about 9 x 7 mm in anterior posterior and  medial lateral dimension. There is no surrounding edema and no mass  effect. The etiology of the hemorrhage remains uncertain. The ventricles  are normal in size. There is no midline shift. No extra-axial fluid  collections are identified and no acute intracranial hemorrhage is seen.  The paranasal sinuses and mastoid air cells and middle ear cavities are  clear. Calcified atherosclerotic plaques are present in the cavernous  segments of the internal carotid arteries bilaterally.     IMPRESSION:     1. Since outside MRI of the head and MRV of the dural sinuses 06/09/2018  and outside noncontrast head CT 06/10/2018, there have been expected  evolutionary changes in the hemorrhage in the right anterior  pontomedullary junction. The density of the hemorrhage has significantly  decreased and now there is a 9 x 7 x 9 mm chronic hematoma cavity  extending from the right anterior inferior velma into the right anterior  medulla. There has been resolution of surrounding edema. There is no  mass effect. The etiology of the right anterior pontomedullary  hemorrhage remains uncertain. It could be from an underlying cavernous  malformation or hemorrhagic transformation of an arterial or venous  infarct. At some point a follow-up MRI of the head with and without  contrast and MRA/MRV could be obtained to reevaluate and compared to the  outside MRI/MRV 06/09/2018.     2. Mild-to-moderate small vessel disease in the cerebral white matter  and there is a 17 x 10 mm old superior medial left frontoparietal  infarct in the left anterior cerebral artery territory. The remainder of  the head CT is unremarkable.  -------------------------------------------------------------------------------    Medication Review: done  Scheduled Meds:    amLODIPine 10 mg Oral Q24H   atorvastatin 40 mg Oral Nightly   cetirizine 10 mg Oral Daily    dabigatran etexilate 150 mg Oral Q12H   hydrALAZINE 50 mg Oral Q8H   levothyroxine 112 mcg Oral Q AM   lisinopril 20 mg Oral Q12H   metoprolol tartrate 25 mg Oral Q12H   multivitamin 1 tablet Oral Daily   pantoprazole 40 mg Oral QAM   PARoxetine 10 mg Oral Daily   [START ON 7/7/2018] PARoxetine 5 mg Oral Daily     Continuous Infusions:   PRN Meds:.•  acetaminophen  •  hydrALAZINE  •  melatonin  •  MEXSANA  •  nitroglycerin  •  ondansetron      Assessment/Plan     Active Problems:    CVA (cerebral vascular accident) (CMS/HCC)      Assessment & Plan  S/p  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  Follow-up CT of the head on June 21 here with follow-up neurosurgery next day to determine possible resuming anticoagulation  June 25 - Per Dr Mendes, to hold Eliquis for at least two weeks pending f/u CT head and eval by [facility] , which he had on Friday. Seen by Dr Holland Neurosurgery on June 22 - cleared to resume anticoagulation. Discussed risks and benefits of resuming anticoagulation with patient today. He was cleared by Neurosurgery to resume anticoagulation.  Patient wishes to resume Eliquis.Will plan to start with dose this evening.   June 26 - patient declined Eliquis last PM as  his cardiologist wished to assess options.   Reviewed with Dr Carrasco - Cardiology - today. He has call out to Neurosurgery regarding resuming Eliquis vs change to Pradaxa as has a reversal agent available.     June 28- awaiting decision from Dr. Carrasco  June 30 - Discussed with Dr Carrasco - will start Pradaxa. Patient in agreemant.        Psych- on Paxil chronically - ? Discontinue if resumes oral anticoagulant as can increase risk of bleeding. June 22- CT stable. Anticipate will be resuming Eliquis when sees Neurosurgery. Reviewed with patient. Dr Talley not available today. Will taper off Paxil from 15 mg daily to 10 mg daily for 2 weeks, then 5 mg daily for two weeks, then stop. Will plan to discuss with PCP other options  for anxiety. June 26- discussed with Dr Talley yesterday - plan for starting Buspar once tapers further on Paxil.     Elevated transaminases - June 30 - decreased atorvastatin to 1/2 dose to 40 mg daily. July 3 - ALT and AST improved    Dysarthria    [facility] Add E-stim for dysphagia.  - VFSS 6-29 - no change    Dysphonia    PAF with previous chronic anticoagulation- now on Pradaxa  July 3 - noted irregular on exam today, EKG shows back in atrial fibrillation. Previous EKG June 11 at outside hospital NSR. Looking at graphics may have gone back into atrial fib when had temp yesterday. Outside cardiology clinic note June 6 notes history of difficulty tolerating AV dano blocking meds. On Metoprolol. K=3.3- replace, Mg 2.3. Recheck TSH in AM. Consulted cardiology for input on recurrent atrial fib as well as meds for HTN given increase in creatinine.   July 4 - back in NSR. Cr improved today.     CKD3-  June 30 - Cr 1.39. July 3 - increased to 1.58. Cr was 1.5 mid-May, 1.6 at one point at outside hospital on June 9, but then improved to 1.0. . He was on Lisinopril at 40 mg daily as outpatient per office note June 6. Now on Lisinopril 20 mg bid. Will get input from Cardiology regarding BP meds as well. July 4 - Cr improved to 1.26.     HTN- Admit on amlodipine/lisinopril/metoprolol. June 20 - BP elevated last PM - added hydralazine 10 mg q 6 hours prn SBP> 160 or DBP> 100.  June 21-(has not received any when necessary hydralazine) added scheduled hydralazine 10 mg by mouth every 8 hours.  Has bradycardia pulse in the 50s so did not increase metoprolol.  On amlodipine 10 mg a day and lisinopril 20 mg twice a day. June 22- BP still elevated with . Increase Hydralazine to 25 mg q 8 hours. June 26 - edema 1+ BLE on amlodipine 10 mg daily. Had edema in past. 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 If BP better control, will decrease amlodipine to 5 mg. Consider diuretic. Will review  with Cardiology. June 29 - BP increased when has temp elevation but better than afternoon. Edema better BLE so will note change from Norvasc 10 mg presently. June 30 -Follow BP, may increase hydralazine.  Edema resolved BLE. July 1 - -[phone] this AM. Review additional hydralazine 10 mg now at 10:30 and then increase from 25 mg q 8 hours to 50 mg q 8 hours. July 3 - BP better 125/62 range but back into atrial fib and creatinine increased . Get input from Cardiology on med adjustment. July 4 - Back in sinus rhythm and BP controlled. Discussed could have fever from hydralazine but as BP improved and back on anticoagulation with history of atrial fib and also recent medulla oblongata hemorrhage when BP was elevated, would not want to discontinue hydralazine at this time.     Gout -   controlled with Tylenol - better.     Fever   June 28 -   Fever 101.5 . Nursing reports chills/ feels cold. No pulmonary or dysuria. No significant rash, No recent skin lesion, No recent infections.   Lungs CTA. Heart Mild tachy, regular, AB - NABS,soft, NT. Ext - left first MTP non-tender. Left calf larger than right.   Will check CXR, UA, Blood cultures with fever and chills, CBC.  Doubt gout fever with chills as no pain left first MTP now.  Doubt DVT with c/o chills but LLE larger than right and will check venous duplex.   BLE Venous duplex negative for DVT.  CXR   clear.   UA negative.    Procalcitonin 0.25   Will hold on antibiotics with low procalcitonin.  Could possibly have some pneumonitis as cause with his dysphagia.     June 29 - temp 100.4 this AM but afebrile this afternoon. Will recheck CBC. Will also recheck CMP to assess renal insuff and LFTs on high dose statin. Blood culture negative at 24 hours. LLE still larger than RLE but noticeable less - may be neurogenic edema.      July 1 - Temp 100.3 last PM, afebrile now. Cough greenish sputum up today, no previous pulm complaints. Reviewed will check viral panel,  sputum specimen.  July 2 - viral panel negative. Blood culture negative at 3 days. Temp 101.0 this AM. Suspect represents viral illness. Recheck pro-calcitonin and WBC today - unremarkable. Pro-calcitonin mildly increased at 0.30, WBC 10.3  July 3 - Temp 100.6 last PM. No focal complaint. F/U CXR clear and WBC 7K. Suspect possible viral etiology vs pneumonitis. BC x 4 days.   July 4 - No fever past 24 hours, Tmax 100.0. No focal complaint. WBC normal. No eosinophilia.   Meds reviewed. Discussed could have fever from hydralazine but as BP improved and back on anticoagulation with history of atrial fib and also recent medulla oblongata hemorrhage when BP was elevated, would not want to discontinue hydralazine at this time. Other possibility viral illness vs pneumonitis.  Instructions:  Keep log of temperature 2-3 times a day.  Follow up with Primary Care physician if recurrent fever.       Rehabilitation program.  Physical therapy 1 hour, Occupational therapy 1 hour, speech 1 hour, 5 days a week.  Rehabilitation nursing for carryover monitoring of his cardiac status, neurologic status, bowel bladder and skin.  Ongoing physician follow-up.  Weekly team conferences.       TEAM CONF- June 20 - BED SUP,TRANSFERS CTG. GAIT 160 FEET MIN ASSIT. UBD SET UP. LBD MIN ASSIST.  MILD TO MODERATE DYSARTHRIA. LEFT FACIAL DROP.  DYSPHAGIA- MECH SOFT, NTL. ADD E-STIM  COGNITIIVE SCREEN APPEARS WFL. FURTHER TESTING FOR HIGHER LEVEL. CONTINENT BOWEL AND BLADDER  ELOS- 14 days  Addendum: Patient had fall in bathroom landed on back against the wall. He denies hitting head. Denies any significant back pain, no new numbness or weakness. Tolerates sitting. Order neuro checks q 4 hours x 2, then q shift.    TEAM CNF - June 27 - ADLS SBA-SET UP. . TRANSFERS CTG. GAIT 240 FEET CTG. 21 STAIRS CTG. DYSPHAGIA - TOLERATES TRIALS OF THIN LIQUIDS. E-STIM FOR DYSPHAGIA. USING STRATEGIES FOR ARTICULATION. CUES FOR SAFETY STRATEGIES WITH NURSING.  CONTINENT.  BNE (Active)  Att'n. - WNL  Exec Fx. - WNL  Rsng/Jgmnt - WNL  Arith - Min Imp.  Visuospatial Skills - WNL  Visual Mem. - WNL  Verbal Mem - WNL  Emot - Pt denied dep/anx  ELOS - TUES    July 4 - tolerates mech soft, no mixed consistency, nectar thick liquids.   He has some general fatigue but no change in strength or coordination or swallow. He feels coordination improving. Mild dysarthria. Transfers SBA. Gait SBA/ feet RW, picking objects off floor. LBB CTG. UBD set up. Bathing supervision wit grab bar and hand held shower and supported sitting/standing. .   ---------------------------------------------------------------------------------    July 4 - Look at discharge home today.    Outpatient PT and SLP at [facility]  No driving. No ETOH.  F/u PCP, Neurology, Neurosurgery, Cardiology, PMR        Danis Mckeon MD  07/04/18  12:42 PM    Time:

## 2018-07-04 NOTE — THERAPY TREATMENT NOTE
Inpatient Rehabilitation - Physical Therapy Treatment Note  [facility]     Patient Name: Mratín Souza  : 1940  MRN: [id number]    Today's Date: 2018                 Admit Date: 6/15/2018      Visit Dx:      ICD-10-CM ICD-9-CM   1. Dysarthria R47.1 784.51       Patient Active Problem List   Diagnosis   • CVA (cerebral vascular accident) (CMS/HCC)       Therapy Treatment          IRF Treatment Summary     Row Name 18 1140 18 0946          Evaluation/Treatment Time and Intent    Subjective Information no complaints  - complains of;weakness  -LB     Document Type therapy note (daily note)  - therapy note (daily note)  -LB     Mode of Treatment individual therapy;speech-language pathology  -SL physical therapy  -LB     Patient/Family Observations seen bedside- feeling better, but anxious to be d/c home today; wife present  -SL seated in w/c. Wife present  -LB     Start Time (Evaluation/Treatment) 1100  -SL  --     Stop Time (Evaluation/Treatment) 1130  -SL  --     Recorded by [SL] Jenn Gardiner MS CCC-SLP [LB] Chey Owens PTA     Row Name 18 0946             Bed Mobility Assessment/Treatment    Comment (Bed Mobility) up un w/c  -LB      Recorded by [LB] Chey Owens PTA      Row Name 18 0946             Sit-Stand Transfer    Sit-Stand [place] (Transfers) stand by assist  -LB      Assistive Device (Sit-Stand Transfers) walker, front-wheeled  -LB      Recorded by [LB] Chey Owens PTA      Row Name 18 0946             Stand-Sit Transfer    Stand-Sit [place] (Transfers) stand by assist  -LB      Assistive Device (Stand-Sit Transfers) walker, front-wheeled  -LB      Recorded by [LB] Chey Owens PTA      Row Name 18 0946             Gait/Stairs Assessment/Training    [place] Level (Gait) contact guard;stand by assist  -LB      Assistive Device (Gait) walker, front-wheeled  -LB      Distance in Feet (Gait) 160  -LB       Deviations/Abnormal Patterns (Gait) base of support, narrow  -LB      Bilateral Gait Deviations forward flexed posture  -LB      Left Sided Gait Deviations heel strike decreased  -LB      [place] Level (Stairs) contact guard  -LB      Handrail Location (Stairs) right side (ascending)  -LB      Number of Steps (Stairs) 8  -LB      Ascending Technique (Stairs) step-over-step  -LB      Descending Technique (Stairs) step-over-step  -LB      Recorded by [LB] Chey Owens PTA      Row Name 07/04/18 0946             Pain Scale: Numbers Pre/Post-Treatment    Pain Scale: Numbers, Pretreatment 0/10 - no pain  -LB      Recorded by [LB] Chey Owens PTA      Row Name 07/04/18 0946             Dynamic Balance Activity    Therapeutic Training Performed (Dynamic Balance) --   amb 12 over uneven surface, rwx, CGA  -LB      Recorded by [LB] Chey Owens PTA      Row Name 07/04/18 0946             Positioning and Restraints    Post Treatment Position wheelchair  -LB      Other Position return to room with caregiver  -LB      Recorded by [LB] Chey Owens PTA        User Key  (r) = Recorded By, (t) = Taken By, (c) = Cosigned By    Initials Name Effective Dates    SL Jenn Gardiner MS CCC-SLP 06/08/18 -     LB Chey Owens PTA 03/07/18 -                Physical Therapy Education     Title: PT OT SLP Therapies (Done)     Topic: Physical Therapy (Done)     Point: Mobility training (Done)    Learning Progress Summary     Learner Status Readiness Method Response Comment Documented by    Patient Done Acceptance E VU,NR  LB 07/03/18 1536     Done Acceptance E,H DU  LB 07/02/18 1547     Done Acceptance E VU,NR Reinforced need for AD.  Pt denies concerns re pass tomorrow. KP 06/30/18 1332     Done Acceptance E,D VU,DU curb, car, gait, stairs LB 06/28/18 0915     Done Acceptance E,D VU,NR  LB 06/27/18 1433     Done Eager E,TB VU,NR  EE 06/26/18 1104     Done Acceptance E VU,NR car, stairs, curb LB 06/25/18 1506      Done Acceptance E VU,NR  LB 06/23/18 1310     Done Acceptance E VU,NR curb, stairs LB 06/22/18 0927     Done Eager E VU,DU,NR  KP 06/21/18 0913     Done Acceptance E VU,NR  LB 06/19/18 1557     Done Acceptance E,TB VU,NR transfers, gait, stairs LB 06/18/18 1521    Family Done Acceptance E,H DU  LB 07/02/18 1547    Significant Other Done Acceptance E VU,DU curb, ramp, gait LB 06/29/18 0931     Done Acceptance E,D VU,DU curb, car, gait, stairs LB 06/28/18 0915     Done Acceptance E,D VU,DU gait, car tsf LB 06/20/18 1443          Point: Home exercise program (Done)    Learning Progress Summary     Learner Status Readiness Method Response Comment Documented by    Patient Done Acceptance E,H DU  LB 07/02/18 1547     Done Acceptance E VU,NR Reinforced need for AD.  Pt denies concerns re pass tomorrow. KP 06/30/18 1332     Done Eager E,TB VU,NR  EE 06/26/18 1104     Done Eager E VU,DU,NR  KP 06/21/18 0913    Family Done Acceptance E,H DU  LB 07/02/18 1547          Point: Body mechanics (Done)    Learning Progress Summary     Learner Status Readiness Method Response Comment Documented by    Patient Done Acceptance E,H DU  LB 07/02/18 1547     Done Acceptance E,D VU,DU curb, car, gait, stairs LB 06/28/18 0915     Done Eager E,TB VU,NR  EE 06/26/18 1104     Active Acceptance D NR  CC 06/21/18 0023     Done Acceptance E VU,NR  LB 06/19/18 1557    Family Done Acceptance E,H DU  LB 07/02/18 1547    Significant Other Done Acceptance E,D VU,DU curb, car, gait, stairs LB 06/28/18 0915          Point: Precautions (Done)    Learning Progress Summary     Learner Status Readiness Method Response Comment Documented by    Patient Done Acceptance E VU  LB 07/04/18 1158     Done Acceptance E,H DU  LB 07/02/18 1547     Done Acceptance E VU,NR Reinforced need for AD.  Pt denies concerns re pass tomorrow. KP 06/30/18 1332     Done Eager E,TB VU,NR  EE 06/26/18 1104     Done Acceptance CAMERON GRAHAM MD 06/16/18 1056    Family Done Acceptance HALEY BARNES   LB 07/02/18 1547                      User Key     Initials Effective Dates Name Provider Type Discipline    LB 03/07/18 -  Chey Owens PTA Physical Therapy Assistant PT    EE 04/03/18 -  Daphne Manning, PT Physical Therapist PT    MD 04/03/18 -  Lupis Calderon, PT Physical Therapist PT    KP 04/03/18 -  Erin Ng, PT Physical Therapist PT    CC 01/17/18 -  Mei Young, RN Registered Nurse Nurse                  PT Recommendation and Plan                               Outcome Measures     Row Name 07/02/18 1400             Modified [place] Scale    Modified Ryan Scale 3 - Moderate disability.  Requiring some help, but able to walk without assistance.  -DN         Functional Assessment    Outcome Measure Options Modified Ryan  -DN        User Key  (r) = Recorded By, (t) = Taken By, (c) = Cosigned By    Initials Name Provider Type    DN Trip Og, OTR Occupational Therapist             Time Calculation:           PT Charges     Row Name 07/04/18 1157             Time Calculation    Start Time 0930  -LB      Stop Time 1000  -LB      Time Calculation (min) 30 min  -LB        User Key  (r) = Recorded By, (t) = Taken By, (c) = Cosigned By    Initials Name Provider Type     Chey Owens PTA Physical Therapy Assistant            Therapy Charges for Today     Code Description Service Date Service Provider Modifiers Qty    38590179025 HC PT THER PROC EA 15 MIN 7/3/2018 Chey Owens PTA GP 4    79985357633 HC PT THER PROC EA 15 MIN 7/4/2018 Chey Owens PTA GP 2            PT G-Codes  Outcome Measure Options: Boo Owens PTA  7/4/2018

## 2018-07-05 NOTE — PROGRESS NOTES
SECTION GG      Mobility Performance Discharge:   Roll Left and Right: Patient completed the activities by him/herself with no  assistance from a helper.   Sit to Lying: Patient completed the activities by him/herself with no  assistance from a helper.   Lying to Sitting on Side of Bed: Patient completed the activities by  him/herself with no assistance from a helper.   Sit to Stand: [place] provides verbal cues or touching/steadying assistance as  patient completes activity.   Chair/Bed to Chair Transfer: [place] provides verbal cues or touching/steadying  assistance as patient completes activity.   Car Transfer: [place] provides verbal cues or touching/steadying assistance as  patient completes activity.    Patient Walks:  Yes   Walk 10 Feet: [place] provides verbal cues or touching/steadying assistance as  patient completes activity.   Walk 50 Feet With 2 Turns: [place] provides verbal cues or touching/steadying  assistance as patient completes activity.   Walk 150 Feet: [place] provides verbal cues or touching/steadying assistance as  patient completes activity.   Walking 10 Feet on Uneven Surfaces: [place] provides verbal cues or  touching/steadying assistance as patient completes activity.   1 Step Over Curb or Up/Down Stair: [place] provides verbal cues or  touching/steadying assistance as patient completes activity.   4 Steps Up and Down, With/Without Rail: [place] provides verbal cues or  touching/steadying assistance as patient completes activity.   12 Steps Up and Down, With/Without Rail: Not attempted due to medical or safety  concerns.   Picking up an Object: [place] provides verbal cues or touching/steadying  assistance as patient completes activity.     Uses Wheelchair/Scooter: No    Signed by: Chey Owens PTA

## 2018-07-05 NOTE — THERAPY DISCHARGE NOTE
Inpatient Rehabilitation - Occupational Therapy Discharge Summary  [facility]     Patient Name: Martín Souza  : 1940  MRN: [id number]    Today's Date: 2018                 Admit Date: 6/15/2018        OT Recommendation and Plan    Visit Dx:    ICD-10-CM ICD-9-CM   1. Dysarthria R47.1 784.51                     OT IRF GOALS     Row Name 18 1023 18 0911 18 1400       Transfer Goal 1 (OT-IRF)    Activity/Assistive Device (Transfer Goal 1, OT-IRF) toilet;shower chair;walk-in shower;walker, rolling  -DN toilet;shower chair;walk-in shower;walker, rolling  -DN toilet;shower chair;walk-in shower;walker, rolling  -DN    [place] Level (Transfer Goal 1, OT-IRF) supervision required;verbal cues required  -DN supervision required;verbal cues required  -DN supervision required;verbal cues required  -DN    Time Frame (Transfer Goal 1, OT-IRF)  -- short term goal (STG)  -DN  --    Barriers (Transfer Goal 1, OT-IRF)  --  -- pt ill last couple of days   -DN    Progress/Outcomes (Transfer Goal 1, OT-IRF) goal not met  -DN medical status inhibiting progress;goal ongoing  -DN goal not met  -DN       Transfer Goal 2 (OT-IRF)    Activity/Assistive Device (Transfer Goal 2, OT-IRF) toilet;shower chair;walk-in shower;walker, rolling  -DN toilet;shower chair;walk-in shower;walker, rolling  -DN toilet;shower chair;walk-in shower;walker, rolling  -DN    [place] Level (Transfer Goal 2, OT-IRF) supervision required;verbal cues required  -DN supervision required;verbal cues required  -DN supervision required;verbal cues required  -DN    Time Frame (Transfer Goal 2, OT-IRF)  -- long term goal (LTG)  -DN  --    Progress/Outcomes (Transfer Goal 2, OT-IRF) goal not met  -DN medical status inhibiting progress;goal ongoing  -DN goal not met  -DN       Bathing Goal 1 (OT-IRF)    Activity/Device (Bathing Goal 1, OT-IRF) bathing skills, all;grab bar/tub rail;hand-held shower spray hose;shower chair  -DN bathing  skills, all;grab bar/tub rail;hand-held shower spray hose;shower chair  -DN bathing skills, all;grab bar/tub rail;hand-held shower spray hose;shower chair  -DN    [place] Level (Bathing Goal 1, OT-IRF) supervision required;verbal cues required  -DN supervision required;verbal cues required  -DN  --    Time Frame (Bathing Goal 1, OT-IRF)  -- short term goal (STG)  -DN  --    Progress/Outcomes (Bathing Goal 1, OT-IRF) goal met  -DN goal met  -DN goal met;goal revised this date  -DN       Bathing Goal 2 (OT-IRF)    Activity/Device (Bathing Goal 2, OT-IRF) bathing skills, all;grab bar/tub rail;hand-held shower spray hose;shower chair  -DN  -- bathing skills, all;grab bar/tub rail;hand-held shower spray hose;shower chair  -DN    [place] Level (Bathing Goal 2, OT-IRF) supervision required  -DN supervision required  -DN  --    Time Frame (Bathing Goal 2, OT-IRF)  -- long term goal (LTG);by discharge  -DN  --    Progress/Outcomes (Bathing Goal 2, OT-IRF) goal met  -DN goal met  -DN goal not met  -DN       UB Dressing Goal 1 (OT-IRF)    Activity/Device (UB Dressing Goal 1, OT-IRF) upper body dressing  -DN upper body dressing  -DN upper body dressing  -DN    [place] (UB Dress Goal 1, OT-IRF) supervision required  -DN supervision required  -DN  --    Time Frame (UB Dressing Goal 1, OT-IRF)  -- short term goal (STG)  -DN  --    Progress/Outcomes (UB Dressing Goal 1, OT-IRF) goal met  -DN goal met  -DN goal met  -DN       UB Dressing Goal 2 (OT-IRF)    Activity/Device (UB Dressing Goal 2, OT-IRF) upper body dressing  -DN upper body dressing  -DN upper body dressing  -DN    [place] (UB Dress Goal 2, OT-IRF) supervision required  -DN supervision required  -DN  --    Time Frame (UB Dressing Goal 2, OT-IRF)  -- long term goal (LTG)  -DN  --    Progress/Outcomes (UB Dressing Goal 2, OT-IRF) goal met  -DN goal met  -DN goal met  -DN       LB Dressing Goal 1 (OT-IRF)    Activity/Device (LB Dressing Goal 1, OT-IRF)  lower body dressing  -DN lower body dressing  -DN lower body dressing  -DN    [place] (LB Dressing Goal 1, OT-IRF) supervision required  -DN supervision required  -DN  --    Time Frame (LB Dressing Goal 1, OT-IRF)  -- short term goal (STG)  -DN  --    Progress/Outcomes (LB Dressing Goal 1, OT-IRF) goal not met  -DN medical status inhibiting progress;goal ongoing  -DN goal met  -DN       LB Dressing Goal 2 (OT-IRF)    Activity/Device (LB Dressing Goal 2, OT-IRF) lower body dressing  -DN lower body dressing  -DN lower body dressing  -DN    [place] (LB Dressing Goal 2, OT-IRF) supervision required  -DN supervision required  -DN  --    Time Frame (LB Dressing Goal 2, OT-IRF)  -- long term goal (LTG)  -DN  --    Progress/Outcomes (LB Dressing Goal 2, OT-IRF) goal not met  -DN medical status inhibiting progress;goal ongoing  -DN goal met  -DN       Toileting Goal 1 (OT-IRF)    Activity/Device (Toileting Goal 1, OT-IRF) toileting skills, all;perform perineal hygiene;adjust/manage clothing;grab bar/safety frame  -DN toileting skills, all;perform perineal hygiene;adjust/manage clothing;grab bar/safety frame  -DN toileting skills, all;perform perineal hygiene;adjust/manage clothing;grab bar/safety frame  -DN    [place] Level (Toileting Goal 1, OT-IRF) supervision required  -DN supervision required  -DN  --    Time Frame (Toileting Goal 1, OT-IRF) short term goal (STG)  -DN  --  --    Progress/Outcomes (Toileting Goal 1, OT-IRF) goal met  -DN medical status inhibiting progress;goal ongoing  -DN goal met  -DN       Toileting Goal 2 (OT-IRF)    Activity/Device (Toileting Goal 2, OT-IRF) toileting skills, all;adjust/manage clothing;perform perineal hygiene  -DN  -- toileting skills, all;adjust/manage clothing;perform perineal hygiene  -DN    [place] Level (Toileting Goal 2, OT-IRF) supervision required;verbal cues required  -DN supervision required;verbal cues required  -DN  --    Time Frame (Toileting Goal 2,  OT-IRF)  -- long term goal (LTG)  -DN  --    Progress/Outcomes (Toileting Goal 2, OT-IRF)  -- medical status inhibiting progress;goal ongoing  -DN goal met  -DN       Strength Goal 1 (OT-IRF)    Strength Goal 1 (OT-IRF) Pt to increase LUE strength to 4-/5 to assist with ADLs.  -DN Pt to increase LUE strength to 4-/5 to assist with ADLs.  -DN Pt to increase LUE strength to 4-/5 to assist with ADLs.  -DN    Time Frame (Strength Goal 1, OT-IRF)  -- short term goal (STG);1 week  -DN  --    Progress/Outcomes (Strength Goal 1, OT-IRF) goal met  -DN goal met  -DN goal met  -DN       Strength Goal 2 (OT-IRF)    Strength Goal 2 (OT-IRF) Pt to increase LUE strength to 4/5 to assist with ADL.  -DN Pt to increase LUE strength to 4/5 to assist with ADL.  -DN  --    Time Frame (Strength Goal 2, OT-IRF)  -- long term goal (LTG);by discharge  -DN long term goal (LTG);by discharge  -DN    Progress/Outcomes (Strength Goal 2, OT-IRF) goal met  -DN goal met  -DN goal met  -DN       Balance Goal 1 (OT)    Activity/Assistive Device (Balance Goal 1, OT) standing, static;standing, dynamic  -DN standing, static;standing, dynamic  -DN standing, static;standing, dynamic  -DN    [place] Level/Cues Needed (Balance Goal 1, OT) supervision required  -DN supervision required  -DN  --    Time Frame (Balance Goal 1, OT)  -- long term goal (LTG);by discharge  -DN  --    Progress/Outcomes (Balance Goal 1, OT) goal met  -DN medical status inhibiting progress;goal ongoing  -DN goal met  -DN       Caregiver Training Goal 1 (OT-IRF)    Caregiver Training Goal 1 (OT-IRF) Pt and family to be indep with HEP, AD/AE, ADLs, functional transfers and IADL tasks upon d/c isamar.  -DN Pt and family to be indep with HEP, AD/AE, ADLs, functional transfers and IADL tasks upon d/c isamar.  -DN Pt and family to be indep with HEP, AD/AE, ADLs, functional transfers and IADL tasks upon d/c isamar.  -DN    Time Frame (Caregiver Training Goal 1, OT-IRF) long term goal (LTG);by  discharge  -DN long term goal (LTG);by discharge  -DN  --    Progress/Outcomes (Caregiver Training Goal 1, OT-IRF) goal met  -DN goal met  -DN goal met  -DN    Row Name 06/25/18 1200             Transfer Goal 1 (OT-IRF)    Activity/Assistive Device (Transfer Goal 1, OT-IRF) toilet;shower chair;walk-in shower;walker, rolling  -DN      [place] Level (Transfer Goal 1, OT-IRF) supervision required;verbal cues required  -DN      Time Frame (Transfer Goal 1, OT-IRF) short term goal (STG)  -DN      Progress/Outcomes (Transfer Goal 1, OT-IRF) good progress toward goal;goal revised this date;goal ongoing  -DN         Transfer Goal 2 (OT-IRF)    Activity/Assistive Device (Transfer Goal 2, OT-IRF) toilet;shower chair;walk-in shower;walker, rolling  -DN      [place] Level (Transfer Goal 2, OT-IRF) supervision required;verbal cues required  -DN      Time Frame (Transfer Goal 2, OT-IRF) long term goal (LTG)  -DN      Progress/Outcomes (Transfer Goal 2, OT-IRF) goal revised this date;goal ongoing  -DN         Bathing Goal 1 (OT-IRF)    Activity/Device (Bathing Goal 1, OT-IRF) bathing skills, all;grab bar/tub rail;hand-held shower spray hose;shower chair  -DN      [place] Level (Bathing Goal 1, OT-IRF) supervision required;verbal cues required  -DN      Time Frame (Bathing Goal 1, OT-IRF) short term goal (STG)  -DN      Progress/Outcomes (Bathing Goal 1, OT-IRF) goal met;goal revised this date  -DN         Bathing Goal 2 (OT-IRF)    Activity/Device (Bathing Goal 2, OT-IRF) bathing skills, all;grab bar/tub rail;hand-held shower spray hose;shower chair  -DN      [place] Level (Bathing Goal 2, OT-IRF) supervision required  -DN      Time Frame (Bathing Goal 2, OT-IRF) long term goal (LTG);by discharge  -DN      Progress/Outcomes (Bathing Goal 2, OT-IRF) goal ongoing  -DN         UB Dressing Goal 1 (OT-IRF)    Activity/Device (UB Dressing Goal 1, OT-IRF) upper body dressing  -DN      [place] (UB Dress Goal 1,  OT-IRF) supervision required  -DN      Time Frame (UB Dressing Goal 1, OT-IRF) short term goal (STG)  -DN      Progress/Outcomes (UB Dressing Goal 1, OT-IRF) goal met;goal ongoing  -DN         UB Dressing Goal 2 (OT-IRF)    Activity/Device (UB Dressing Goal 2, OT-IRF) upper body dressing  -DN      [place] (UB Dress Goal 2, OT-IRF) supervision required  -DN      Time Frame (UB Dressing Goal 2, OT-IRF) long term goal (LTG)  -DN      Progress/Outcomes (UB Dressing Goal 2, OT-IRF) goal ongoing  -DN         LB Dressing Goal 1 (OT-IRF)    Activity/Device (LB Dressing Goal 1, OT-IRF) lower body dressing  -DN      [place] (LB Dressing Goal 1, OT-IRF) contact guard assist;supervision required  -DN      Time Frame (LB Dressing Goal 1, OT-IRF) short term goal (STG)  -DN      Progress/Outcomes (LB Dressing Goal 1, OT-IRF) goal met;goal revised this date  -DN         LB Dressing Goal 2 (OT-IRF)    Activity/Device (LB Dressing Goal 2, OT-IRF) lower body dressing  -DN      [place] (LB Dressing Goal 2, OT-IRF) supervision required  -DN      Time Frame (LB Dressing Goal 2, OT-IRF) long term goal (LTG)  -DN      Progress/Outcomes (LB Dressing Goal 2, OT-IRF) goal ongoing  -DN         Toileting Goal 1 (OT-IRF)    Activity/Device (Toileting Goal 1, OT-IRF) toileting skills, all;perform perineal hygiene;adjust/manage clothing;grab bar/safety frame  -DN      [place] Level (Toileting Goal 1, OT-IRF) contact guard assist  -DN      Time Frame (Toileting Goal 1, OT-IRF) short term goal (STG)  -DN      Progress/Outcomes (Toileting Goal 1, OT-IRF) goal met;goal revised this date  -DN         Toileting Goal 2 (OT-IRF)    Activity/Device (Toileting Goal 2, OT-IRF) toileting skills, all;adjust/manage clothing;perform perineal hygiene  -DN      [place] Level (Toileting Goal 2, OT-IRF) supervision required;verbal cues required  -DN      Time Frame (Toileting Goal 2, OT-IRF) long term goal (LTG)  -DN      Progress/Outcomes  (Toileting Goal 2, OT-IRF) goal ongoing  -DN         Strength Goal 1 (OT-IRF)    Strength Goal 1 (OT-IRF) Pt to increase LUE strength to 4-/5 to assist with ADLs.  -DN      Time Frame (Strength Goal 1, OT-IRF) short term goal (STG);1 week  -DN      Progress/Outcomes (Strength Goal 1, OT-IRF) goal met;goal revised this date  -DN         Strength Goal 2 (OT-IRF)    Strength Goal 2 (OT-IRF) Pt to increase LUE strength to 4/5 to assist with ADL.  -DN      Time Frame (Strength Goal 2, OT-IRF) long term goal (LTG);by discharge  -DN      Progress/Outcomes (Strength Goal 2, OT-IRF) goal ongoing  -DN         Balance Goal 1 (OT)    Activity/Assistive Device (Balance Goal 1, OT) standing, static;standing, dynamic  -DN      [place] Level/Cues Needed (Balance Goal 1, OT) supervision required  -DN      Time Frame (Balance Goal 1, OT) long term goal (LTG);by discharge  -DN      Progress/Outcomes (Balance Goal 1, OT) goal ongoing  -DN         Caregiver Training Goal 1 (OT-IRF)    Caregiver Training Goal 1 (OT-IRF) Pt and family to be indep with HEP, AD/AE, ADLs, functional transfers and IADL tasks upon d/c isamar.  -DN      Time Frame (Caregiver Training Goal 1, OT-IRF) long term goal (LTG);by discharge  -DN      Progress/Outcomes (Caregiver Training Goal 1, OT-IRF) goal ongoing;goal not met  -DN        User Key  (r) = Recorded By, (t) = Taken By, (c) = Cosigned By    Initials Name Provider Type    GRACIELA Bocanegra Occupational Therapist                Outcome Measures     Row Name 07/02/18 1400             Modified [place] Scale    Modified [place] Scale 3 - Moderate disability.  Requiring some help, but able to walk without assistance.  -DN         Functional Assessment    Outcome Measure Options Modified [place]  -DN        User Key  (r) = Recorded By, (t) = Taken By, (c) = Cosigned By    Initials Name Provider Type    GRACIELA Bocanegra Occupational Therapist          Therapy Suggested Charges     Code   Minutes Charges     None             Therapy Charges for Today     Code Description Service Date Service Provider Modifiers Qty    57320960746  OT SELF CARE/MGMT/TRAIN EA 15 MIN 7/4/2018 GRACIELA Garcia GO 2          OT Discharge Summary  Anticipated Discharge Disposition (OT): home with home health  Reason for Discharge: Discharge from facility  Outcomes Achieved: Patient able to partially acheive established goals  Discharge Destination: Home with home health, Home with assist      GRACIELA Garcia  7/5/2018

## 2018-07-05 NOTE — SIGNIFICANT NOTE
07/05/18 1457   SLP Discharge Summary   Anticipated Dischage Disposition home with assist;home with OP services   Reason for Discharge discharge from this facility   Progress Toward Achieving Short/long Term Goals goals partially met within established timelines   Discharge Destination home w/ assist;home w/ OP services;anticipated therapy at next level of care

## 2018-07-05 NOTE — THERAPY DISCHARGE NOTE
Inpatient Rehabilitation - IRF Physical Therapy Discharge Summary  [facility]       Patient Name: Martín Souza  : 1940  MRN: [id number]    Today's Date: 2018                 Admit Date: 6/15/2018      PT Recommendation and Plan    Visit Dx:    ICD-10-CM ICD-9-CM   1. Dysarthria R47.1 784.51             Outcome Measures     Row Name 18 1400             Modified [place] Scale    Modified [place] Scale 3 - Moderate disability.  Requiring some help, but able to walk without assistance.  -DN         Functional Assessment    Outcome Measure Options Modified [place]  -DN        User Key  (r) = Recorded By, (t) = Taken By, (c) = Cosigned By    Initials Name Provider Type    ELIAS Og OTR Occupational Therapist                      PT IRF GOALS     Row Name 18 0700             Bed Mobility Goal 1 (PT-IRF)    Activity/Assistive Device (Bed Mobility Goal 1, PT-IRF) sit to supine/supine to sit  -LB      Progress/Outcomes (Bed Mobility Goal 1, PT-IRF) goal met  -LB         Transfer Goal 1 (PT-IRF)    Activity/Assistive Device (Transfer Goal 1, PT-IRF) sit-to-stand/stand-to-sit;bed-to-chair/chair-to-bed;car transfer;walker, rolling  -LB      Progress/Outcomes (Transfer Goal 1, PT-IRF) goal met   except for car tsf which was CGA  -LB         Gait/Walking Locomotion Goal 1 (PT-IRF)    Activity/Assistive Device (Gait/Walking Locomotion Goal 1, PT-IRF) gait (walking locomotion);walker, rolling  -LB      Gait/Walking Locomotion Distance Goal 1 (PT-IRF) 200  -LB      [place] Level (Gait/Walking Locomotion Goal 1, PT-IRF) supervision required  -LB      Progress/Outcomes (Gait/Walking Locomotion Goal 1, PT-IRF) goal not met;unable to make needed progress   pnt ill with fever the last few days of his Rehab stay.  -LB         Stairs Goal 1 (PT-IRF)    Activity/Assistive Device (Stairs Goal 1, PT-IRF) stairs, all skills;using handrail, left;using handrail, right  -LB      Progress/Outcomes (Stairs  Goal 1, PT-IRF) goal met  -LB        User Key  (r) = Recorded By, (t) = Taken By, (c) = Cosigned By    Initials Name Provider Type    RIKKI Owens PTA Physical Therapy Assistant          Therapy Suggested Charges     Code   Minutes Charges    None             Therapy Charges for Today     Code Description Service Date Service Provider Modifiers Qty    64310041024 HC PT THER PROC EA 15 MIN 7/4/2018 Chey Owens PTA GP 2          PT Discharge Summary  Reason for Discharge: Discharge from facility  Outcomes Achieved: Patient able to partially acheive established goals  Discharge Destination: Home with assist, Home with outpatient services      Chey Owens PTA   7/5/2018

## 2018-07-05 NOTE — PLAN OF CARE
Problem: Patient Care Overview  Goal: Plan of Care Review  Outcome: Unable to achieve outcome(s) by discharge Date Met: 07/05/18 07/05/18 4033   Patient Care Overview   IRF Plan of Care Review progress ongoing, continue;discharged   Progress, Functional Goals demonstrating adequate progress   OTHER   Outcome Summary Patient with ongoing mild dysphagia and dysarthria. D/c'ed home 7/4/18. OP speech therapy is recommended. HEP given for swallow.    Coping/Psychosocial   Plan of Care Reviewed With patient;spouse

## 2018-07-05 NOTE — THERAPY DISCHARGE NOTE
Inpatient Rehabilitation - Speech Language Pathology Discharge Summary  [facility]       Patient Name: Martín Souza  : 1940  MRN: [id number]    Today's Date: 2018                   Admit Date: 6/15/2018      SLP Recommendation and Plan    Visit Dx:    ICD-10-CM ICD-9-CM   1. Dysarthria R47.1 784.51                     SLP GOALS     Row Name 18 1200 18 1000          Oral Nutrition/Hydration Goal 1 (SLP)    Oral Nutrition/Hydration Goal 1, SLP  -- Pt will tolerate recommended diet   -KB     Barriers (Oral Nutrition/Hydration Goal 1, SLP) no overt clinical s/s aspiration noted on ice chips from cup  -SL NMES at 5 mA this session; given trials of thin liquid throughout the session, wet vocal quality was noted inconsistently, and patient was observed to cough x1  -KB        Lingual Strengthening Goal 1 (SLP)    Activity (Lingual Strengthening Goal 1, SLP)  -- increase lingual tone/sensation/control/coordination/movement;increase buccal tension or tone;increase tongue back strength  -KB     Increase Lingual Tone/Sensation/Control/Coordination/Movement  -- lingual movement exercises;lingual resistance exercises  -KB     Increase Buccal Tension or Tone  -- MOST/swallow strong;swallow trials  -KB     Increase Tongue Back Strength lingual movement exercises  -SL lingual movement exercises;swallow trials;lingual resistance exercises  -KB     [place]/Accuracy (Lingual Strengthening Goal 1, SLP) independently (over 90% accuracy);with minimal cues (75-90% accuracy)  -SL independently (over 90% accuracy)  -KB     Progress/Outcomes (Lingual Strengthening Goal 1, SLP)  -- good progress toward goal  -KB        Pharyngeal Strengthening Exercise Goal 1 (SLP)    Activity (Pharyngeal Strengthening Goal 1, SLP) increase superior movement of the hyolaryngeal complex;increase anterior movement of the hyolaryngeal complex;increase tongue base retraction  -SL  --     Increase Superior Movement of the  Hyolaryngeal Complex falsetto;hard effortful swallow;effortful pitch glide (falsetto + pharyngeal squeeze);joann;breath hold exercises   min cues for pharyngeal tasks  -SL  --     Increase Anterior Movement of the Hyolaryngeal Complex falsetto;hard effortful swallow;effortful pitch glide (falsetto + pharyngeal squeeze);joann;breath hold exercises   tasks completed x 10 each  -SL  --     Increase Tongue Base Retraction joann  -SL  --       User Key  (r) = Recorded By, (t) = Taken By, (c) = Cosigned By    Initials Name Provider Type    SL Jenn Gardiner MS CCC-SLP Speech and Language Pathologist    KB Katherine L Bruton Speech and Language Pathologist                  SLP Discharge Summary  Anticipated Dischage Disposition: home with assist, home with OP services  Reason for Discharge: discharge from this facility  Progress Toward Achieving Short/long Term Goals: goals partially met within established timelines  Discharge Destination: home w/ assist, home w/ OP services, anticipated therapy at next level of care      Katherine L Bruton  7/5/2018

## 2018-07-06 ENCOUNTER — TRANSCRIBE ORDERS (OUTPATIENT)
Dept: PHYSICAL MEDICINE AND REHAB | Facility: HOSPITAL | Age: 78
End: 2018-07-06

## 2018-07-06 DIAGNOSIS — R13.10 DYSPHAGIA, UNSPECIFIED TYPE: ICD-10-CM

## 2018-07-06 DIAGNOSIS — Z86.73 HISTORY OF RECENT STROKE: Primary | ICD-10-CM

## 2018-07-09 ENCOUNTER — HOSPITAL ENCOUNTER (OUTPATIENT)
Dept: PHYSICAL THERAPY | Facility: HOSPITAL | Age: 78
Setting detail: THERAPIES SERIES
Discharge: HOME | End: 2018-07-09
Attending: PHYSICAL MEDICINE & REHABILITATION

## 2018-07-09 ENCOUNTER — HOSPITAL ENCOUNTER (OUTPATIENT)
Dept: SPEECH THERAPY | Facility: HOSPITAL | Age: 78
Setting detail: THERAPIES SERIES
Discharge: HOME | End: 2018-07-09

## 2018-07-09 DIAGNOSIS — I69.391 DYSPHAGIA FOLLOWING CEREBROVASCULAR ACCIDENT (CVA): Primary | ICD-10-CM

## 2018-07-09 DIAGNOSIS — I69.322 DYSARTHRIA FOLLOWING CEREBROVASCULAR ACCIDENT: ICD-10-CM

## 2018-07-09 DIAGNOSIS — R26.89 FUNCTIONAL GAIT ABNORMALITY: ICD-10-CM

## 2018-07-09 DIAGNOSIS — I63.9 CEREBROVASCULAR ACCIDENT (CVA), UNSPECIFIED MECHANISM (HCC): Primary | ICD-10-CM

## 2018-07-09 PROCEDURE — G8979 MOBILITY GOAL STATUS: HCPCS

## 2018-07-09 PROCEDURE — G8997 SWALLOW GOAL STATUS: HCPCS | Performed by: SPEECH-LANGUAGE PATHOLOGIST

## 2018-07-09 PROCEDURE — G8978 MOBILITY CURRENT STATUS: HCPCS

## 2018-07-09 PROCEDURE — 92610 EVALUATE SWALLOWING FUNCTION: CPT | Performed by: SPEECH-LANGUAGE PATHOLOGIST

## 2018-07-09 PROCEDURE — G8996 SWALLOW CURRENT STATUS: HCPCS | Performed by: SPEECH-LANGUAGE PATHOLOGIST

## 2018-07-09 PROCEDURE — 97162 PT EVAL MOD COMPLEX 30 MIN: CPT

## 2018-07-09 NOTE — THERAPY EVALUATION
Outpatient Speech Language Pathology   Adult Swallow Initial Evaluation  [facility]     Patient Name: Martín Ye  : 1940  MRN: [id number]  Today's Date: 2018         Visit Date: 2018   Patient Active Problem List   Diagnosis   • CVA (cerebral vascular accident) (CMS/HCC)        No past medical history on file.     No past surgical history on file.      Visit Dx:     ICD-10-CM ICD-9-CM   1. Dysphagia following cerebrovascular accident (CVA) I69.391 438.82   2. Dysarthria following cerebrovascular accident I69.322 438.13             Patient History     Row Name 18 1000             History    Chief Complaint Other 1 (comment)   dysphagia and dysarthria  -KA      Date Current Problem(s) Began 18  -KA      What clinical tests have you had for this problem? Modified Barium Swallow Study  -KA      Results of Clinical Tests Please see dysphagia eval  -KA        User Key  (r) = Recorded By, (t) = Taken By, (c) = Cosigned By    Initials Name Provider Type    KA Zane Bello MA,CCC-SLP Speech and Language Pathologist                SLP Adult Swallow Evaluation - 18 1000        Rehab Evaluation    Document Type evaluation  -KA    Subjective Information no complaints  -KA    Patient Observations cooperative  -KA    Patient Effort good  -KA    Symptoms Noted During/After Treatment none  -KA       General Information    Patient Profile Reviewed yes  -KA    Pertinent History Of Current Problem Mr Ye is referred to outpatient speech therapy for dysphagia and dysarthria treatment following recent inpatient rehabiliation and d/c home. On 18 Mr Ye was hospitalized with left sided weakness and numbness. MRI revealed hemorrhage right medulla oblongata. Mr Ye participated in swallow and speech therapy, and most recent VFSS on 18 showed penetration with thin liquids due to mistiming, reduced epiglottic deflection, and BOT strength. Mild pharyngeal residue requiring multiple  "swallows to clear. It was recommended patient remain on mechanical soft no mixed and NTL. During evaluation Mr Souza did state he was not going to drink the thickened liquids at home. He did state he was drinking tomato juice with his meals. Mr Souza also stated \"I had peaches with juice and did fine.\" Mrs Souza after session did state to [facility] she cooks and prepares his meals and he is being  compliant with meals and recommended diet. She stated she does cook him mechanical soft food and prepares NTL. Discussed water protocol with patient, okay for water in between meals with adequate oral care and pt/wife verbalized understanding.  -KA    Current Method of Nutrition mechanical soft, no mixed consistencies;nectar/syrup-thick liquids  -KA    Precautions/Limitations, Vision WFL with corrective lenses  -KA    Precautions/Limitations, Hearing WFL;for purposes of eval  -KA    Prior Level of Function-Communication WFL  -KA    Prior Level of Function-Swallowing no diet consistency restrictions;safe, efficient swallowing in all situations  -KA    Plans/Goals Discussed with patient and family  -KA    Barriers to Rehab resistant to information  -KA    Patient's Goals for Discharge return to regular diet  -KA    Family Goals for Discharge patient able to return to regular diet  -KA       Pain Assessment    Additional Documentation Pain Scale: Numbers Pre/Post-Treatment (Group)  -KA       Pain Scale: Numbers Pre/Post-Treatment    Pain Scale: Numbers, Pretreatment 0/10 - no pain  -KA    Pain Scale: Numbers, Post-Treatment 0/10 - no pain  -KA       Oral Motor and Function    Dentition Assessment natural, present and adequate  -KA    Secretion Management WNL/WFL  -KA    Mucosal Quality moist, healthy  -KA    Volitional Swallow WFL  -KA    Volitional Cough WFL  -KA       Oral Musculature and Cranial Nerve Assessment    Oral Motor General Assessment lingual impairment  -KA    Lingual Impairment, Detail. Cranial Nerves IX, XII " (Glossopharyngeal and Hypoglossal) reduced strength left;reduced lingual ROM;other (see comments)   reduced elevation and retraction   -KA    Oral Motor, Comment Mild dysarthria characterized by slurred speech and imprecise articulation at the conversation level. Speech intelligibility was improved with slower speech rate and articulation.   -KA       General Eating/Swallowing Observations    Respiratory Support Currently in Use room air  -KA    Eating/Swallowing Skills self-fed  -KA    Positioning During Eating upright in chair  -KA    Utensils Used cup  -KA    Consistencies Trialed thin liquids   water by cup  -KA       Clinical Swallow Eval    Oral Prep Phase WFL  -KA    Oral Transit WFL  -KA    Oral Residue WFL  -KA    Pharyngeal Phase suspected pharyngeal impairment  -KA    Esophageal Phase unremarkable  -KA    Clinical Swallow Evaluation Summary Patient displayed x1 cough with small sips of water out of 8 sips via cup  -KA       Clinical Impression    SLP Swallowing Diagnosis mild;oral dysfunction;pharyngeal dysfunction   per VFSS on 6/29  -KA    Functional Impact risk of aspiration/pneumonia  -KA    Rehab Potential/Prognosis, Swallowing adequate, monitor progress closely  -KA    Criteria for Skilled Therapeutic Interventions Met demonstrates skilled criteria  -KA       Recommendations    Therapy Frequency (Swallow) 2 days per week  -KA    Predicted Duration Therapy Intervention (Days) 2 weeks;3 weeks;4 weeks;other (see comments)   pending patient cooperation  -KA    SLP Diet Recommendation mechanical soft with no mixed consistencies;nectar thick liquids  -KA    Recommended Precautions and Strategies upright posture during/after eating;small bites of food and sips of liquid;multiple swallows per bite of food;multiple swallows per sip of liquid;alternate between small bites of food and sips of liquid  -KA    SLP Rec. for Method of Medication Administration meds whole;with thick liquids  -KA    Monitor for Signs  "of Aspiration notify SLP if any concerns;pneumonia;right lower lobe infiltrates  -KA    Anticipated Dischage Disposition home  -KA      User Key  (r) = Recorded By, (t) = Taken By, (c) = Cosigned By    Initials Name Provider Type    JOHN Bello MA,CCC-SLP Speech and Language Pathologist                              OP SLP Education     Row Name 07/09/18 1201       Education    Barriers to Learning Resistent to information  -KA    Education Provided Described results of evaluation;Patient expressed understanding of evaluation;Family/caregivers expressed understanding of evaluation  -KA    Assessed Learning needs;Learning motivation  -    Learning Motivation Weak;Moderate  -    Learning Method Explanation;Written materials;Demonstration  -    Teaching Response Verbalized understanding  -    Education Comments --   Patient stated to SLP \"I am not going to drink the thickened liquids.\" However wife stating to SLP he does drink the NTL she makes. Patient reported he will drink just tomato juice and water protocol in between meals. Educated patient on risks for aspiration PNA if he chooses not to comply with the recommended NTL. Discussed s/s of aspiration PNA and aspiration with pt voicing understanding. SLP discussed with pt recommendation is his dysphagia and dysarthria exercises be completed two to three times a day. SLP asked patient if he has been completing exercises since d/c home from rehab and pt stated \"some.\" SLP also dicussed with pt recommendation is for repeating VFSS in 4 weeks with him consistently performing his exercises, and patient stated he does not think repeating VFSS is necessary.   -KA      User Key  (r) = Recorded By, (t) = Taken By, (c) = Cosigned By    Initials Name Effective Dates    JOHN Bello MA,CCC-SLP 06/08/18 -                 SLP OP Goals     Row Name 07/09/18 1100          Goal Type Needed    Goal Type Needed Dysphagia;Dysarthria  -KA        Subjective Pain    Able " "to rate subjective pain? yes  -KA     Pre-Treatment Pain Level 0  -KA     Post-Treatment Pain Level 0  -KA        Dysarthria Goals     Dysarthria LTG's Patient will be able to communicate verbally in an efficient manner so that messages are communicated in timely way with familiar/unfamiliar listeners  -KA     Patient will be able to communicate verbally in an efficient manner  so that messages are communicated in timely way with familiar/unfamiliar listeners 90%:;without cues  -KA     Status: Patient will be able to communicate verbally in an efficient manner  so that messages are communicated in timely way with familiar/unfamiliar listeners New  -KA      Dysarthria STG's Patient will apply compensatory strategies to improve intelligibility of speech during in spontaneous speech;Patient will increase strength and power  of articulators so they can move more quickly and accurately to improve speech intelligibility by  -KA     Patient will apply compensatory strategies to improve intelligibility of speech during in spontaneous speech 90%:;without cues  -KA     Status: Patient will apply compensatory strategies to improve intelligibility of speech during in spontaneous speech New  -KA     \"Patient will increase strength and power  of articulators so they can move more quickly and accurately to improve speech intelligibility by completing lip exercises\" 90%:;without cues  -KA     Status: Patient will increase strength and power  of articulators so they can move more quickly and accurately to improve speech intelligibility by completing lip exercises New  -KA     Comments: Patient will increase strength and power  of articulators so they can move more quickly and accurately to improve speech intelligibility by completing lip exercises lingual strengthening exercises  -KA        Dysphagia Goals    Dysphagia LTG's Patient will safely consume the recommended diet without complications such as aspiration pneumonia  -KA     " Status: Patient will safely consume the recommended diet without complications such as aspiration pneumonia New  -KA     Dysphagia STG's Patient will compensate for oral/pharyngeal deficits and reduce risks while eating by utilizing  compensatory strategies;Patient will increase strength of tongue base and posterior pharyngeal walls to reduce residue that might fall into airway by completing;Patient will increase laryngeal elevation to reduce residue that might fall into airway by completing  -KA     Patient will increase laryngeal elevation to reduce residue that might fall into airway by completing Mendelsohn maneuver;falsetto/pitch glide;without cues  -KA     Status: Patient will increase laryngeal elevation to reduce residue that might fall into airway by completing New  -KA     Patient will increase strength of tongue base and posterior pharyngeal walls to reduce residue that might fall into airway by completing effotful swallow;Nicolle (tongue hold);tongue base retraction;pretend to yawn;pretend to gargle;without cues  -KA     Status: Patient will increase strength of tongue base and posterior pharyngeal walls to reduce residue that might fall into airway by completing New  -KA     Patient will compensate for oral/pharyngeal deficits and reduce risks while eating by utilizing  compensatory strategies controlled bolus size;slow rate;multiple swallows;liquid wash;without cues  -KA     Status: Patient will compensate for oral/pharyngeal deficits and reduce risks while eating by utilizing  compensatory strategies New  -KA       User Key  (r) = Recorded By, (t) = Taken By, (c) = Cosigned By    Initials Name Provider Type    JOHN Bello MA,CCC-SLP Speech and Language Pathologist                OP SLP Assessment/Plan - 07/09/18 1200        SLP Assessment    Functional Problems Swallowing  -KA    Impact on Function: Swallowing Risk of aspiration;Risk of pneumonia  -KA    Clinical Impression: Swallowing  Mild:;oropharyngeal phase dysphagia  -KA    Please refer to paper survey for additional self-reported information Yes  -KA    Please refer to items scanned into chart for additional diagnostic informaiton and handouts as provided by clinician Yes  -KA    Prognosis Good (comment)  -KA    Patient/caregiver participated in establishment of treatment plan and goals Yes  -KA    Patient would benefit from skilled therapy intervention Yes  -KA       SLP Plan    Frequency --   twice a week   -KA    Duration --   2 to 4 weeks pending patient's cooperation and compliance  -KA    Planned CPT's? SLP SWALLOW THERAPY: 42449  -    Expected Duration Therapy Session - minutes 45-60 minutes  -KA      User Key  (r) = Recorded By, (t) = Taken By, (c) = Cosigned By    Initials Name Provider Type    JOHN Bello MA,CCC-SLP Speech and Language Pathologist              SLP Outcome Measures (last 72 hours)      SLP Outcome Measures     Row Name 07/09/18 1200             SLP Outcome Measures    Outcome Measure Used? Adult NOMS  -KA         FCM Scores    FCM Chosen Swallowing  -KA      Swallowing FCM Score 4  -KA        User Key  (r) = Recorded By, (t) = Taken By, (c) = Cosigned By    Initials Name Effective Dates    JOHN Bello MA,CCC-SLP 06/08/18 -                Time Calculation:   SLP Start Time: 1000  SLP Stop Time: 1100  SLP Time Calculation (min): 60 min    Therapy Charges for Today     Code Description Service Date Service Provider Modifiers Qty    99360143236 HC ST SWALLOWING CURRENT STATUS 7/9/2018 Zane Bello MA,CCC-SLP LISA, CK 1    89508304423 HC ST SWALLOWING PROJECTED 7/9/2018 Zane Bello MACCC-SLP LISA, CJ 1    [id number] HC ST EVAL ORAL PHARYNG SWALLOW 4 7/9/2018 Zane Bello MACCC-SLP GN 1          SLP G-Codes  SLP NOMS Used?: Yes  Functional Limitations: Swallowing  Swallow Current Status (): At least 40 percent but less than 60 percent impaired, limited or restricted  Swallow Goal Status ():  At least 20 percent but less than 40 percent impaired, limited or restricted        Zane Bello MA,CCC-SLP  7/9/2018

## 2018-07-09 NOTE — THERAPY EVALUATION
"    .Outpatient Physical Therapy Neuro Initial Evaluation  [facility]     Patient Name: Martín Souza  : 1940  MRN: [id number]  Today's Date: 2018      Visit Date: 2018    Patient Active Problem List   Diagnosis   • CVA (cerebral vascular accident) (CMS/[facility])        Past Medical History:   Diagnosis Date   • CKD (chronic kidney disease), stage III    • Coronary artery disease    • GERD (gastroesophageal reflux disease)    • Hypertension    • Kidney stones    • JENY on CPAP    • Stroke (CMS/[facility]) 2018    \"hemorrhage of R medulla oblongata\"         Past Surgical History:   Procedure Laterality Date   • APPENDECTOMY     • CARDIAC CATHETERIZATION      with cardiac stents          Visit Dx:     ICD-10-CM ICD-9-CM   1. Cerebrovascular accident (CVA), unspecified mechanism (CMS/[facility]) I63.9 434.91   2. Functional gait abnormality R26.89 781.2             Patient History     Row Name 18 1100 18 1000          History    Chief Complaint Balance Problems;Difficulty Walking;Impaired sensation;Muscle weakness  -CHANDRA Other 1 (comment)   dysphagia and dysarthria  -KA     Date Current Problem(s) Began 18  -CHANDRA 18  -     Brief Description of Current Complaint Pt s/p stroke, L extremities and dysphagia and dysarthria. 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 Pt was inpt acute rehab at [facility] d/c home on 18. Pt now referred for outpt PT.   -CHANDRA  --     Previous treatment for THIS PROBLEM Rehabilitation  -CHANDRA  --     Patient/Caregiver Goals Improve mobility;Improve strength   \"complete recovery\"   -CHANDRA  --     Hand Dominance right-handed  -CHANDRA  --     Occupation/sports/leisure activities Pt enjoys gardening, computer research, reading, playing with ITema.  -CHANDRA  --     Patient seeing anyone else for problem(s)? see ST also   -CHANDRA  --     What clinical tests have you had for this problem?  -- Modified Barium Swallow Study  -KA     Results of Clinical Tests  -- Please see dysphagia " shamika  -JOHN        Pain     Pain at Present 0  -CHANDRA  --        Daily Activities    Primary Language English  -CHANDRA  --     How does patient learn best? Reading  -CHANDRA  --     Teaching needs identified Home Exercise Program;Management of Condition;Falls Prevention;Home Safety  -CHANDRA  --     Patient is concerned about/has problems with Climbing Stairs;Difficulty with self care (i.e. bathing, dressing, toileting:;Performing home management (household chores, shopping, care of dependents);Performing job responsibilities/community activities (work, school,;Performing sports, recreation, and play activities;Transfers (getting out of a chair, bed);Walking  -CHANDRA  --     Does patient have problems with the following? Anxiety  -CHANDRA  --     Barriers to learning Hearing   wears hearing aids   -CHANDRA  --     Functional Status bathing;dressing;grooming;mobility issues preventing performance of daily activities  -CHANDRA  --     Pt Participated in POC and Goals Yes  -CHANDRA  --        Safety    Are you being hurt, hit, or frightened by anyone at home or in your life? No  -CHANDRA  --     Are you being neglected by a caregiver No  -CHANDRA  --       User Key  (r) = Recorded By, (t) = Taken By, (c) = Cosigned By    Initials Name Provider Type    CHANDRA Yanez, PT Physical Therapist    JOHN Bello MA,CCC-SLP Speech and Language Pathologist                    PT Neuro     Row Name 07/09/18 1100             Subjective Comments    Subjective Comments Pt using rolling walker all the time at home, ambulating independently with that device. Pt did not use a device prior to stroke. Pt notes that he loses his balance when he might turn his head or take his eyes off his walking.   -CHANDRA         Precautions and Contraindications    Precautions/Limitations fall precautions;swallowing precautions  -CHANDRA         Subjective Pain    Able to rate subjective pain? yes  -CHANDRA      Pre-Treatment Pain Level 0  -CHANDRA      Post-Treatment Pain Level 0  -CHANDRA         Home Living     Living Arrangements --   patio home   -CHANDRA      Home Accessibility stairs to enter home  -CHANDRA      Number of Stairs, Main Entrance two  -CHANDRA      Stair Railings, Main Entrance railing on right side (ascending)  -CHANDRA      Home Equipment Rolling walker  -CHANDRA      Living Environment Comment Pt lives with wife who is in good health and present 24/7.   -CHANDRA         Vision-Basic Assessment    Current Vision No visual deficits  -CHANDRA         Cognition    Overall Cognitive Status WFL  -CHANDRA      Arousal/Alertness Appropriate responses to stimuli  -CHANDRA      Memory Appears intact  -CHANDRA      Orientation Level Oriented X4  -CHANDRA      Safety Judgment Decreased awareness of need for safety  -CHANDRA      Deficits Decreased awareness of deficits  -CHANDRA      Comments Pt is impulsive and turns quickly to sit in chair.   -CHANDRA         Sensation    Additional Comments Pt reports his entire L side is a little numb. He could feel light touch but noted that it was not as strong of sensation as on R.   -CHANDRA         Posture/Observations    Posture/Observations Comments Pt is well nourished male who arrived to therapy with rolling walker. Pt has had the hiccups for ~ 1 week and has been difficult for pt to sleep. His wife was present. Wife spoke to  pt had while inpt who in turn spoke to Dr. Mckeon. MD said he could prescribe a muscle relaxer for the hiccups but not sure if that is best for pt. They will talk on Thursday at pt's next PT therapy visit.   -CHANDRA         General ROM    GENERAL ROM COMMENTS AROM WFLs all extremities.   -CHANDRA         MMT (Manual Muscle Testing)    Additional Documentation General Assessment (Manual Muscle Testing) (Group)  -CHANDRA         General Assessment (Manual Muscle Testing)    General Manual Muscle Testing (MMT) Assessment lower extremity strength deficits identified  -CHANDRA      Comment, General Manual Muscle Testing (MMT) Assessment R UE overall 4+/5. L UE overall 4 to 4+/5.   -CHANDRA         Lower Extremity (Manual Muscle  Testing)    Comment, MMT: Lower Extremity R LE -- 4/5 overall, 4-/5 DF.   L LE: hip flexion 4/5, hip abduction 3+/5, knee ext 4+/5, knee flexion 3+/5, ankle DF and inver/ever 4/5.    -CHANDRA         Bed Mobility Assessment/Treatment    Bed Mobility Assessment/Treatment bed mobility (all) activities  -CHANDRA      Bland Level (Bed Mobility) independent  -CHANDRA         Transfers    Sit-Stand Bland (Transfers) verbal cues;supervision  -CHANDRA      Stand-Sit Bland (Transfers) supervision;verbal cues  -CHANDRA      Transfers, Sit-Stand-Sit, Assist Device rolling walker  -CHANDRA      Transfer, Safety Issues weight-shifting ability decreased;sequencing ability decreased;balance decreased during turns  -CHANDRA      Transfer, Impairments strength decreased;impaired balance   impulsive  -CHANDRA      Comment (Transfers) To stand -- cues to not pull up on walker. To sit -- cues to fully turn prior to sit and to reach back for seat. Reviewed with pt correct transfer technique   -CHANDRA         Gait/Stairs Assessment/Training    Bland Level (Gait) supervision;conditional independence  -CHANDRA      Assistive Device (Gait) walker, front-wheeled  -CHANDRA      Distance in Feet (Gait) 80' x 2  -CHANDRA      Deviations/Abnormal Patterns (Gait) base of support, wide;gait speed decreased  -CHANDRA      Bilateral Gait Deviations weight shift ability decreased;forward flexed posture   loss of balance when looked away   -CHANDRA      Bland Level (Stairs) contact guard  -CHANDRA      Handrail Location (Stairs) right side (ascending)  -CHANDRA      Number of Steps (Stairs) 4  -CHANDRA      Ascending Technique (Stairs) step-over-step  -CHANDRA      Descending Technique (Stairs) step-over-step  -CHANDRA      Stairs, Safety Issues sequencing ability decreased;weight-shifting ability decreased;balance decreased during turns  -CHANDRA      Stairs, Impairments strength decreased;impaired balance  -CHANDRA      Comment (Gait/Stairs) Curb with walker, CG and cues. Had pt practice ambulate with head turns and  head nods slowly. He noted a decrease in his equilibrium when he did this. Pt ambulated with cane with quad tipped, '.   -         Balance Skills Training    Balance Comments see MENDOZA and TUG   -        User Key  (r) = Recorded By, (t) = Taken By, (c) = Cosigned By    Initials Name Provider Type    CHANDRA Yanez, PT Physical Therapist                  Therapy Education  Education Details: Transfer to stand and to sit sequence and slow down; stand with wife by his side to shave; empty ; ambulate with rolling walker and turn head.   Given: Fall prevention and home safety, Mobility training  How Provided: Verbal, Demonstration  Provided to: Patient, Caregiver  Level of Understanding: Teach back education performed, Verbalized, Demonstrated          PT OP Goals     Row Name 07/09/18 1100          PT Short Term Goals    STG Date to Achieve 08/06/18  -     STG 1 Pt will transfer sit to stand and stand to sit slowly and correctly without hold onto device and turn fully prior to sit 90% of the time.   -     STG 2 Pt will be independent with HEP to improve strength and balance.   -     STG 3 Pt will be able to stand without assistive device for 2 minutes SBA while batting balloon or catching ball for improved standing balance.   -     STG 4 Pt will perform Rhomberg eyes open for 1 minute independently.   -     STG 5 Pt will be able to ambulate with rolling walker independently while turning head side to side for 100'.   -     Additional STG's STG 6  -     STG 6 Pt to score at least 38/56 on the MENDOZA balance test to demonstrate improved balance and decreased fall risk.   -        Long Term Goals    LTG Date to Achieve 09/17/18  -     LTG 1 Pt to score at least 45/56 on the MENDOZA balance test to demonstrate improved balance and decreased fall risk.   -     LTG 2 Pt will complete the TUG test in less than 11 seconds using cane for improved functional mobility and decreased fall risk.  "  -CHANDRA     LTG 3 Pt will ambulate in the community with least restrictive device up to 400' independently.   -CHANDRA     LTG 4 Pt will ascend/descend 6\" curb independently with cane.   -CHANDRA     LTG 5 Pt to ascend/descend 4 steps with rail and cane independently.   -CHANDRA     LTG 6 Pt to have increased strength L hip flexion and abduction by 1/2 muscle grade.   -CHANDRA     LTG 7 Pt to have increased strength L knee flexion to 4/5.   -CHANDRA        Time Calculation    PT Goal Re-Cert Due Date 08/06/18  -CHANDRA       User Key  (r) = Recorded By, (t) = Taken By, (c) = Cosigned By    Initials Name Provider Type    CHANDRA Yanez, PT Physical Therapist                PT Assessment/Plan     Row Name 07/09/18 1318          PT Assessment    Functional Limitations Decreased safety during functional activities;Impaired gait;Limitation in home management;Limitations in community activities;Performance in self-care ADL;Performance in leisure activities  -     Impairments Balance;Gait;Muscle strength;Sensation   impulsive   -CHANDRA     Assessment Comments Pt is a 77 y/o male s/p stroke. Pt has some weakness of L LE, deficits with transfers, ambulation and balance. Pt also has decreased sensation of his L side due to the stroke. Pt's PT clinical presentation is evolving due to his deficits and his risk for falls. Pt scored a 32/56 on the MENDOZA balance test and 15 seconds using the walker on the TUG. Pt will benefit from outpt PT to work on deficits and improve pt's independence with mobility.   -CHANDRA     Please refer to paper survey for additional self-reported information Yes  -CHANDRA     Rehab Potential Good  -CHANDRA     Patient/caregiver participated in establishment of treatment plan and goals Yes  -CHANDRA     Patient would benefit from skilled therapy intervention Yes  -CHANDRA        PT Plan    PT Frequency 2x/week  -CHANDRA     Predicted Duration of Therapy Intervention (Therapy Eval) 10 weeks  -CHANDRA     Planned CPT's? PT EVAL MOD COMPLELITY: 80725;PT NEUROMUSC " RE-EDUCATION EA 15 MIN: [id number]  -CHANDRA     Physical Therapy Interventions (Optional Details) balance training;gait training;home exercise program;neuromuscular re-education;patient/family education;stair training;strengthening;transfer training  -CHANDRA       User Key  (r) = Recorded By, (t) = Taken By, (c) = Cosigned By    Initials Name Provider Type    CHANDRA Yanez PT Physical Therapist                   Exercises     Row Name 07/09/18 1100             Subjective Comments    Subjective Comments Pt using rolling walker all the time at home, ambulating independently with that device. Pt did not use a device prior to stroke. Pt notes that he loses his balance when he might turn his head or take his eyes off his walking.   -CHANDRA         Subjective Pain    Able to rate subjective pain? yes  -CHANDRA      Pre-Treatment Pain Level 0  -CHANDRA      Post-Treatment Pain Level 0  -CHANDRA        User Key  (r) = Recorded By, (t) = Taken By, (c) = Cosigned By    Initials Name Provider Type    CHANDRA Yanez PT Physical Therapist                      Outcome Measure Options: Siddiqui Balance, Timed Up and Go (TUG)  Siddiqui Balance Scale  Sitting to Standing: able to stand independently using hands  Standing Unsupported: able to stand 2 minutes with supervision  Sitting with Back Unsupported but Feet Supported on Floor or on Stool: able to sit safely and securely for 2 minutes  Standing to Sitting: controls descent by using hands  Transfers: able to transfer safely definite need of hands  Standing Unsupported with Eyes Closed: able to stand 10 seconds with supervision  Standing Unsupported with Feet Together: able to place feet together independently but unable to hold for 30 seconds  Reaching Forward with Outstretched Arm While Standing: can reach forward 12 cm (5 inches)   Object From the Floor From a Standing Position: able to  object but needs supervision  Turning to Look Behind Over Left and Right Shoulders While Standing:  turns sideways only but maintains balance  Turn 360 Degrees: needs close supervision or verbal cuing  Place Alternate Foot on Step or Stool While Standing Unsupported: able to complete > 2 steps needs minimal assist  Standing Unsupported with One Foot in Front: needs help to step but can hold 15 seconds  Standing on One Leg: unable to try of needs assist to prevent fall  Siddiqui Total Score: 32  Siddiqui Comments: without device   Timed Up and Go (TUG)  TUG Test 1: 16 seconds (using rolling walker )    Time Calculation:   Start Time: 1100  Stop Time: 1146  Time Calculation (min): 46 min  Total Timed Code Minutes- PT: 46 minute(s)   Therapy Suggested Charges     Code   Minutes Charges    None           Therapy Charges for Today     Code Description Service Date Service Provider Modifiers Qty    42316334256 HC PT MOBILITY CURRENT 7/9/2018 Leslie Yanez, PT GP, CK 1    92400014293 HC PT MOBILITY PROJECTED 7/9/2018 Leslie Yanez, PT GP, CI 1    [id number] HC PT EVAL MOD COMPLEXITY 4 7/9/2018 Leslie Yanez, PT GP 1          PT G-Codes  PT Professional Judgement Used?: Yes  Outcome Measure Options: Siddiqui Balance, Timed Up and Go (TUG)  Functional Limitation: Mobility: Walking and moving around  Mobility: Walking and Moving Around Current Status (): At least 40 percent but less than 60 percent impaired, limited or restricted  Mobility: Walking and Moving Around Goal Status (): At least 1 percent but less than 20 percent impaired, limited or restricted         Leslie Yanez, PT  7/9/2018

## 2018-07-12 ENCOUNTER — HOSPITAL ENCOUNTER (OUTPATIENT)
Dept: SPEECH THERAPY | Facility: HOSPITAL | Age: 78
Setting detail: THERAPIES SERIES
Discharge: HOME | End: 2018-07-12

## 2018-07-12 ENCOUNTER — APPOINTMENT (OUTPATIENT)
Dept: PHYSICAL THERAPY | Facility: HOSPITAL | Age: 78
End: 2018-07-12
Attending: PHYSICAL MEDICINE & REHABILITATION

## 2018-07-12 DIAGNOSIS — I69.322 DYSARTHRIA FOLLOWING CEREBROVASCULAR ACCIDENT: ICD-10-CM

## 2018-07-12 DIAGNOSIS — I69.391 DYSPHAGIA FOLLOWING CEREBROVASCULAR ACCIDENT (CVA): Primary | ICD-10-CM

## 2018-07-12 PROCEDURE — G8998 SWALLOW D/C STATUS: HCPCS | Performed by: SPEECH-LANGUAGE PATHOLOGIST

## 2018-07-12 PROCEDURE — G8997 SWALLOW GOAL STATUS: HCPCS | Performed by: SPEECH-LANGUAGE PATHOLOGIST

## 2018-07-12 PROCEDURE — 92526 ORAL FUNCTION THERAPY: CPT | Performed by: SPEECH-LANGUAGE PATHOLOGIST

## 2018-07-12 NOTE — THERAPY DISCHARGE NOTE
"Outpatient Speech Language Pathology   Adult Swallow Treatment Note/Discharge Summary  [facility]     Patient Name: Martín Ye  : 1940  MRN: [id number]  Today's Date: 2018         Visit Date: 2018   Patient Active Problem List   Diagnosis   • CVA (cerebral vascular accident) (CMS/[facility])        Visit Dx:    ICD-10-CM ICD-9-CM   1. Dysphagia following cerebrovascular accident (CVA) I69.391 438.82   2. Dysarthria following cerebrovascular accident I69.322 438.13             SLP Adult Swallow Evaluation - 18 1000        Rehab Evaluation    Document Type evaluation  -KA    Subjective Information no complaints  -KA    Patient Observations cooperative  -KA    Patient Effort good  -KA    Symptoms Noted During/After Treatment none  -KA       General Information    Patient Profile Reviewed yes  -KA    Pertinent History Of Current Problem Mr Ye is referred to outpatient speech therapy for dysphagia and dysarthria treatment following recent inpatient rehabiliation and d/c home. On 18 Mr Ye was hospitalized with left sided weakness and numbness. MRI revealed hemorrhage right medulla oblongata. Mr Ye participated in swallow and speech therapy, and most recent VFSS on 18 showed penetration with thin liquids due to mistiming, reduced epiglottic deflection, and BOT strength. Mild pharyngeal residue requiring multiple swallows to clear. It was recommended patient remain on mechanical soft no mixed and NTL. During evaluation Mr Ye did state he was not going to drink the thickened liquids at home. He did state he was drinking tomato juice with his meals. Mr Ye also stated \"I had peaches with juice and did fine.\" Mrs Ye after session did state to SLP she cooks and prepares his meals and he is being  compliant with meals and recommended diet. She stated she does cook him mechanical soft food and prepares NTL. Discussed water protocol with patient, okay for water in between meals " with adequate oral care and pt/wife verbalized understanding.  -KA    Current Method of Nutrition mechanical soft, no mixed consistencies;nectar/syrup-thick liquids  -KA    Precautions/Limitations, Vision WFL with corrective lenses  -KA    Precautions/Limitations, Hearing WFL;for purposes of eval  -KA    Prior Level of Function-Communication WFL  -KA    Prior Level of Function-Swallowing no diet consistency restrictions;safe, efficient swallowing in all situations  -KA    Plans/Goals Discussed with patient and family  -KA    Barriers to Rehab resistant to information  -KA    Patient's Goals for Discharge return to regular diet  -KA    Family Goals for Discharge patient able to return to regular diet  -KA       Pain Assessment    Additional Documentation Pain Scale: Numbers Pre/Post-Treatment (Group)  -KA       Pain Scale: Numbers Pre/Post-Treatment    Pain Scale: Numbers, Pretreatment 0/10 - no pain  -KA    Pain Scale: Numbers, Post-Treatment 0/10 - no pain  -KA       Oral Motor and Function    Dentition Assessment natural, present and adequate  -KA    Secretion Management WNL/WFL  -KA    Mucosal Quality moist, healthy  -KA    Volitional Swallow WFL  -KA    Volitional Cough WFL  -KA       Oral Musculature and Cranial Nerve Assessment    Oral Motor General Assessment lingual impairment  -KA    Lingual Impairment, Detail. Cranial Nerves IX, XII (Glossopharyngeal and Hypoglossal) reduced strength left;reduced lingual ROM;other (see comments)   reduced elevation and retraction   -KA    Oral Motor, Comment Mild dysarthria characterized by slurred speech and imprecise articulation at the conversation level. Speech intelligibility was improved with slower speech rate and articulation.   -KA       General Eating/Swallowing Observations    Respiratory Support Currently in Use room air  -KA    Eating/Swallowing Skills self-fed  -KA    Positioning During Eating upright in chair  -KA    Utensils Used cup  -KA    Consistencies  "Trialed thin liquids   water by cup  -       Clinical Swallow Eval    Oral Prep Phase WFL  -KA    Oral Transit WFL  -KA    Oral Residue WFL  -KA    Pharyngeal Phase suspected pharyngeal impairment  -KA    Esophageal Phase unremarkable  -KA    Clinical Swallow Evaluation Summary Patient displayed x1 cough with small sips of water out of 8 sips via cup  -       Clinical Impression    SLP Swallowing Diagnosis mild;oral dysfunction;pharyngeal dysfunction   per VFSS on 6/29  -    Functional Impact risk of aspiration/pneumonia  -KA    Rehab Potential/Prognosis, Swallowing adequate, monitor progress closely  -    Criteria for Skilled Therapeutic Interventions Met demonstrates skilled criteria  -       Recommendations    Therapy Frequency (Swallow) 2 days per week  -KA    Predicted Duration Therapy Intervention (Days) 2 weeks;3 weeks;4 weeks;other (see comments)   pending patient cooperation  -    SLP Diet Recommendation mechanical soft with no mixed consistencies;nectar thick liquids  -    Recommended Precautions and Strategies upright posture during/after eating;small bites of food and sips of liquid;multiple swallows per bite of food;multiple swallows per sip of liquid;alternate between small bites of food and sips of liquid  -KA    SLP Rec. for Method of Medication Administration meds whole;with thick liquids  -    Monitor for Signs of Aspiration notify SLP if any concerns;pneumonia;right lower lobe infiltrates  -KA    Anticipated Dischage Disposition home  -      User Key  (r) = Recorded By, (t) = Taken By, (c) = Cosigned By    Initials Name Provider Type    JOHN Bello MA,CCC-SLP Speech and Language Pathologist                              SLP OP Goals     Row Name 07/12/18 1500          Subjective Comments    Subjective Comments Patient provides conflicting information regarding compliance with thickened liquids. When asked if he is drinking thickened liquids, he states \"No not really I don't " "like them.\" However wife reports he drinks thickened liquids she makes for him. Later in session patient asked \"when can I get off thickened liquids.\" Plan is due to patient independent with all exercises, he demonstrates understanding of importance to complete his exercises two to three times a day and will repeat VFSS in three weeks. SLP educated patient on risks for aspiration PNA and s/s of aspiration and PNA if he is not complying with thickened liquids. Patient is drinking water in water protocol. Pt/wife verbalized understanding of education today. Pt d/c today and is independent with home exercise program.   -KA        Subjective Pain    Able to rate subjective pain? yes  -KA     Pre-Treatment Pain Level 0  -KA     Post-Treatment Pain Level 0  -KA        Dysarthria Goals    Patient will be able to communicate verbally in an efficient manner  so that messages are communicated in timely way with familiar/unfamiliar listeners 90%:;without cues  -KA     Status: Patient will be able to communicate verbally in an efficient manner  so that messages are communicated in timely way with familiar/unfamiliar listeners Achieved  -KA     Patient will apply compensatory strategies to improve intelligibility of speech during in spontaneous speech 90%:;without cues  -KA     Status: Patient will apply compensatory strategies to improve intelligibility of speech during in spontaneous speech Achieved  -KA     Comments: Patient will apply compensatory strategies to improve intelligibility of speech during in spontaneous speech Patient's speech intelligibility at the sentence level is 100%, discussed slower speech rate and overarticulation as needed  -KA     \"Patient will increase strength and power  of articulators so they can move more quickly and accurately to improve speech intelligibility by completing lip exercises\" 90%:;without cues  -KA     Status: Patient will increase strength and power  of articulators so they can move " more quickly and accurately to improve speech intelligibility by completing lip exercises Achieved  -KA     Comments: Patient will increase strength and power  of articulators so they can move more quickly and accurately to improve speech intelligibility by completing lip exercises Patient independent with lingual ROM, strengthening and resistance exercises 100% without cues  -KA        Dysphagia Goals    Status: Patient will safely consume the recommended diet without complications such as aspiration pneumonia Achieved  -KA     Patient will increase laryngeal elevation to reduce residue that might fall into airway by completing Mendelsohn maneuver;falsetto/pitch glide;without cues  -KA     Status: Patient will increase laryngeal elevation to reduce residue that might fall into airway by completing Achieved  -KA     Comments: Patient will increase laryngeal elevation to reduce residue that might fall into airway by completing 100% without cues  -KA     Patient will increase strength of tongue base and posterior pharyngeal walls to reduce residue that might fall into airway by completing effotful swallow;Nicolle (tongue hold);tongue base retraction;pretend to yawn;pretend to gargle;without cues  -KA     Status: Patient will increase strength of tongue base and posterior pharyngeal walls to reduce residue that might fall into airway by completing Achieved  -KA     Comments: Patient will increase strength of tongue base and posterior pharyngeal walls to reduce residue that might fall into airway by completing 100% without cues  -KA     Patient will compensate for oral/pharyngeal deficits and reduce risks while eating by utilizing  compensatory strategies controlled bolus size;slow rate;multiple swallows;liquid wash;without cues  -KA     Status: Patient will compensate for oral/pharyngeal deficits and reduce risks while eating by utilizing  compensatory strategies Achieved  -KA     Comments: Patient will compensate for  oral/pharyngeal deficits and reduce risks while eating by utilizing  compensatory strategies Patient independent with safe swallow strategies  -JOHN       User Key  (r) = Recorded By, (t) = Taken By, (c) = Cosigned By    Initials Name Provider Type    JOHN Bello MA,CCC-SLP Speech and Language Pathologist                OP SLP Education     Row Name 07/12/18 1558       Education    Learning Motivation Strong  -JOHN    Learning Method Explanation;Demonstration;Written materials;Teach back  -JOHN      User Key  (r) = Recorded By, (t) = Taken By, (c) = Cosigned By    Initials Name Effective Dates    JOHN Bello MA,CHANDA-SLP 06/08/18 -                 OP SLP Assessment/Plan - 07/12/18 1558        SLP Assessment    Clinical Impression: Swallowing Mild:;oropharyngeal phase dysphagia  -JOHN      User Key  (r) = Recorded By, (t) = Taken By, (c) = Cosigned By    Initials Name Provider Type    JOHN Bello MA,CCC-SLP Speech and Language Pathologist                Time Calculation:   SLP Start Time: 1500  SLP Stop Time: 1545  SLP Time Calculation (min): 45 min    Therapy Charges for Today     Code Description Service Date Service Provider Modifiers Qty    68445048673 HC ST SWALLOWING PROJECTED 7/12/2018 Zane Bello MA,CCC-SLP GN, CK 1    66777924403 HC ST SWALLOWING DISCHARGE 7/12/2018 Zane Bello MA,CCC-SLP LISA, CK 1    32079568005 HC ST TREATMENT SWALLOW 3 7/12/2018 Zane Bello MA,CCC-SLP GN 1          SLP G-Codes  Functional Limitations: Swallowing  Swallow Goal Status (): At least 40 percent but less than 60 percent impaired, limited or restricted  Swallow Discharge Status (): At least 40 percent but less than 60 percent impaired, limited or restricted    OP SLP Discharge Summary  Date of Discharge: 07/12/18  Reason for Discharge: all goals and outcomes met, no further needs identified  Progress Toward Achieving Short/long Term Goals: all goals met within established timelines  Discharge  Destination:  (home with home exercise program)  Discharge Instructions:  (Patient is independent with home exercise program and to complete three times a day, and repeat VFSS in three weeks)      Zane Bello MA,CCC-SLP  7/12/2018

## 2018-07-16 ENCOUNTER — HOSPITAL ENCOUNTER (OUTPATIENT)
Dept: PHYSICAL THERAPY | Facility: HOSPITAL | Age: 78
Setting detail: THERAPIES SERIES
Discharge: HOME | End: 2018-07-16
Attending: PHYSICAL MEDICINE & REHABILITATION

## 2018-07-16 ENCOUNTER — APPOINTMENT (OUTPATIENT)
Dept: SPEECH THERAPY | Facility: HOSPITAL | Age: 78
End: 2018-07-16

## 2018-07-16 DIAGNOSIS — I63.9 CEREBROVASCULAR ACCIDENT (CVA), UNSPECIFIED MECHANISM (HCC): Primary | ICD-10-CM

## 2018-07-16 DIAGNOSIS — R26.89 FUNCTIONAL GAIT ABNORMALITY: ICD-10-CM

## 2018-07-16 PROCEDURE — 97112 NEUROMUSCULAR REEDUCATION: CPT

## 2018-07-16 NOTE — THERAPY TREATMENT NOTE
Outpatient Physical Therapy Neuro Treatment Note  [facility]     Patient Name: Martín Souza  : 1940  MRN: [id number]  Today's Date: 2018      Visit Date: 2018    Visit Dx:    ICD-10-CM ICD-9-CM   1. Cerebrovascular accident (CVA), unspecified mechanism (CMS/HCC) I63.9 434.91   2. Functional gait abnormality R26.89 781.2       Patient Active Problem List   Diagnosis   • CVA (cerebral vascular accident) (CMS/HCC)                 PT Neuro     Row Name 18 1101             Subjective Comments    Subjective Comments Pt denies any falls. Pt said he  continues to ambulate with rolling walker at all times at home. Pt c/o feeling tired since his stroke.   -CHANDRA         Precautions and Contraindications    Precautions/Limitations fall precautions;swallowing precautions  -CHANDRA         Subjective Pain    Able to rate subjective pain? yes  -CHANDRA      Pre-Treatment Pain Level 0  -CHANDRA      Post-Treatment Pain Level 0  -CHANDRA         Posture/Observations    Posture/Observations Comments Pt is arrived to therapy with rolling walker. Pt did not have hiccups today. Pt drank some water while resting and coughed some. Reminded pt to drink slowly. Pt tired quickly and needed to sit and rest ~ 6x during 45 minute session.   -CHANDRA         Transfers    Sit-Stand [place] (Transfers) verbal cues;supervision  -CHANDRA      Stand-Sit [place] (Transfers) supervision;verbal cues  -CHANDRA      Transfers, Sit-Stand-Sit, Assist Device rolling walker;straight cane  -CHANDRA      Transfer, Safety Issues weight-shifting ability decreased;sequencing ability decreased;balance decreased during turns  -CHANDRA      Transfer, Impairments strength decreased;impaired balance  -CHANDRA      Comment (Transfers) Pt turned fully prior to sitting 100% of the time today. Pt did require cues to not pull up on walker or cane to stand.   -CHANDRA         Gait/Stairs Assessment/Training    [place] Level (Gait) contact guard;verbal cues   cues for sequence with  "cane   -CHANDRA      Assistive Device (Gait) cane, straight;cane, tripod  -CHANDRA      Distance in Feet (Gait) 180' x 1 with Able Tripod cane; 180' x 1 with straight cane   -CHANDRA      Deviations/Abnormal Patterns (Gait) base of support, wide;gait speed decreased  -CHANDRA      Bilateral Gait Deviations weight shift ability decreased;forward flexed posture  -CHANDRA      Comment (Gait/Stairs) Rolling walker conditional independence 100' x 2  -CHANDRA         Balance Skills Training    Gait Balance-Level of Assistance Contact guard  -CHANDRA      Gait Balance Support Right upper extremity supported;Left upper extremity supported  -CHANDRA      Gait Balance Activities side-stepping;backwards  -CHANDRA        User Key  (r) = Recorded By, (t) = Taken By, (c) = Cosigned By    Initials Name Provider Type    CHANDRA Yanez, PT Physical Therapist                        PT Assessment/Plan     Row Name 07/16/18 1326          PT Assessment    Assessment Comments Pt tired quickly with SOA. Pt denied fatigue like this prior to stroke. Pt needs to get up and exercise more often and walk more in order to see improvement in endurance.   -CHANDRA       User Key  (r) = Recorded By, (t) = Taken By, (c) = Cosigned By    Initials Name Provider Type    CHANDRA Yanez, PT Physical Therapist                     Exercises     Row Name 07/16/18 1101             Subjective Comments    Subjective Comments Pt denies any falls. Pt said he  continues to ambulate with rolling walker at all times at home. Pt c/o feeling tired since his stroke.   -CHANDRA         Subjective Pain    Able to rate subjective pain? yes  -CHANDRA      Pre-Treatment Pain Level 0  -CHANDRA      Post-Treatment Pain Level 0  -CHANDRA         Exercise 1    Exercise Name 1 NuStep all extremities, level 2  -CHANDRA      Time 1 5 minutes   -CHANDRA         Exercise 2    Exercise Name 2 Hemibar: sidestep with knees flexed   -CHANDRA      Reps 2 4 x 10'  -CHANDRA         Exercise 3    Exercise Name 3 Hemibar: alternate tapping feet on 4\" step   -CHANDRA      " "Reps 3 20  -CHANDRA         Exercise 4    Exercise Name 4 Hemibar: forward step  up and lateral step up with L LE 4\" step   -CHANDRA      Cueing 4 Verbal;Tactile  -CHANDRA      Reps 4 10 each   -CHANDRA         Exercise 5    Exercise Name 5 [place]ibar: L knee flexion and B heel raises   -CHANDRA      Reps 5 20 reps   -CHANDRA         Exercise 6    Exercise Name 6 Hemibar: R foot up on 4\" step, L foot on floor -- pt flexed/extended knee.   -CHANDRA      Reps 6 15  -CHANDRA        User Key  (r) = Recorded By, (t) = Taken By, (c) = Cosigned By    Initials Name Provider Type    CHANDRA Yanez, PT Physical Therapist                            Therapy Education  Education Details: Encouraged pt to get out and ambulate in his patio complex using his walker, ambulate at the grocery shopping cart. Instructed pt to try alternate tapping 1 foot up/down bottom step.  Given: HEP, Mobility training  How Provided: Verbal, Demonstration  Provided to: Patient, Caregiver  Level of Understanding: Teach back education performed, Verbalized, Demonstrated              Time Calculation:   Start Time: 1101  Stop Time: 1146  Time Calculation (min): 45 min  Total Timed Code Minutes- PT: 45 minute(s)   Therapy Suggested Charges     Code   Minutes Charges    None           Therapy Charges for Today     Code Description Service Date Service Provider Modifiers Qty    [id number] HC PT NEUROMUSC RE EDUCATION EA 15 MIN 7/16/2018 Leslie Yanez, PT GP 3                    Leslie Yanez, PT  7/16/2018     "

## 2018-07-17 ENCOUNTER — TRANSCRIBE ORDERS (OUTPATIENT)
Dept: ADMINISTRATIVE | Facility: HOSPITAL | Age: 78
End: 2018-07-17

## 2018-07-17 DIAGNOSIS — R13.12 OROPHARYNGEAL DYSPHAGIA: Primary | ICD-10-CM

## 2018-07-19 ENCOUNTER — APPOINTMENT (OUTPATIENT)
Dept: SPEECH THERAPY | Facility: HOSPITAL | Age: 78
End: 2018-07-19

## 2018-07-19 ENCOUNTER — HOSPITAL ENCOUNTER (OUTPATIENT)
Dept: PHYSICAL THERAPY | Facility: HOSPITAL | Age: 78
Setting detail: THERAPIES SERIES
Discharge: HOME | End: 2018-07-19
Attending: PHYSICAL MEDICINE & REHABILITATION

## 2018-07-19 DIAGNOSIS — I63.9 CEREBROVASCULAR ACCIDENT (CVA), UNSPECIFIED MECHANISM (HCC): Primary | ICD-10-CM

## 2018-07-19 DIAGNOSIS — R26.89 FUNCTIONAL GAIT ABNORMALITY: ICD-10-CM

## 2018-07-19 PROCEDURE — 97112 NEUROMUSCULAR REEDUCATION: CPT

## 2018-07-19 NOTE — THERAPY TREATMENT NOTE
"    Outpatient Physical Therapy Neuro Treatment Note  [facility]     Patient Name: Martín Souza  : 1940  MRN: [id number]  Today's Date: 2018      Visit Date: 2018    Visit Dx:    ICD-10-CM ICD-9-CM   1. Cerebrovascular accident (CVA), unspecified mechanism (CMS/HCC) I63.9 434.91   2. Functional gait abnormality R26.89 781.2       Patient Active Problem List   Diagnosis   • CVA (cerebral vascular accident) (CMS/HCC)                 PT Neuro     Row Name 18 1340             Subjective Comments    Subjective Comments Pt said his son is getting  in [place] in late August and the condo he will be staying in has 17 steps to enter. Pt wants to be able to progress to ambulate without a cane. Pt said he's on 3 new medicines that help lower BP. \"I heard one of them can make you tired.\" Pt wanders if his fatigue is related to the new meds or if because his BP is so much lower than it use to be.   -CHANDRA         Precautions and Contraindications    Precautions/Limitations fall precautions;swallowing precautions  -CHANDRA         Subjective Pain    Able to rate subjective pain? yes  -CHANDRA      Pre-Treatment Pain Level 0  -CHANDRA      Post-Treatment Pain Level 0  -CHANDRA         Posture/Observations    Posture/Observations Comments Pt is arrived to therapy with rolling walker. Pt wanted a drink of water at end of session and reached for his granddaughter's cup with a straw in it. Daughter reminded pt he was not to use a straw.   -CHANDRA         Transfers    Sit-Stand [place] (Transfers) verbal cues;supervision   cues to not pull up on cane   -CHANDRA      Stand-Sit [place] (Transfers) supervision;verbal cues  -      Transfers, Sit-Stand-Sit, Assist Device straight cane   with Able Tripod Base.   -CHANDRA      Transfer, Safety Issues weight-shifting ability decreased;sequencing ability decreased;balance decreased during turns  -CHANDRA      Transfer, Impairments strength decreased;impaired balance  -CHANDRA         " Gait/Stairs Assessment/Training    [place] Level (Gait) contact guard;verbal cues   cues for sequence and to slow down   -CHANDRA      Assistive Device (Gait) cane, straight;cane, tripod  -CHANDRA      Distance in Feet (Gait) 180 x 2; 120' x 3  -CHANDRA      Deviations/Abnormal Patterns (Gait) base of support, wide;other (see comments)   fast gait speed   -CHANDRA      Bilateral Gait Deviations weight shift ability decreased;forward flexed posture  -CHANDRA      [place] Level (Stairs) contact guard   cues to get more than 1/2 of foot on step   -CHANDRA      Handrail Location (Stairs) left side (ascending)  -CHANDRA      Number of Steps (Stairs) 20  -CHANDRA      Ascending Technique (Stairs) step-over-step  -CHANDRA      Descending Technique (Stairs) step-over-step  -CHANDRA      Stairs, Safety Issues sequencing ability decreased;balance decreased during turns  -CHANDRA      Stairs, Impairments strength decreased;impaired balance  -CHANDRA      Comment (Gait/Stairs) Curb: practiced several times. Verbal cues to for cane placement and to lead up with R foot and down with L. Pt ambulated with rolling walker 250' x 1 independently -- done to increase endurance. Practiced turns with cane. When pt ambulated quickly with cane he had poor sequence with cane and more loss of balance - better at slower rate.   -CHANDRA         Balance Skills Training    Standing-Level of Assistance Contact guard  -CHANDRA      Static Standing Balance Support No upper extremity supported  -CHANDRA      Standing-Balance Activities --   raised ther ball up down staggered step & side by side  -CHANDRA      Gait Balance-Level of Assistance Contact guard  -CHANDRA      Gait Balance Support Right upper extremity supported;Left upper extremity supported  -CHANDRA      Gait Balance Activities side-stepping;backwards;tandem   dribbled ball - more loss of balance with this   -CHANDRA        User Key  (r) = Recorded By, (t) = Taken By, (c) = Cosigned By    Initials Name Provider Type    CHANDRA Yanez PT Physical Therapist     "                    PT Assessment/Plan     Row Name 07/19/18 1644          PT Assessment    Assessment Comments Pt was able to tolerate more exercises and activity before he had to sit and rest. Pt did better with cane when he ambulated slowly, better sequence and better balance.   -CHANDRA       User Key  (r) = Recorded By, (t) = Taken By, (c) = Cosigned By    Initials Name Provider Type    CHANDRA Yanez, PT Physical Therapist                     Exercises     Row Name 07/19/18 1340             Subjective Comments    Subjective Comments Pt said his son is getting  in [place] in late August and the condo he will be staying in has 17 steps to enter. Pt wants to be able to progress to ambulate without a cane. Pt said he's on 3 new medicines that help lower BP. \"I heard one of them can make you tired.\" Pt wanders if his fatigue is related to the new meds or if because his BP is so much lower than it use to be.   -CHANDRA         Subjective Pain    Able to rate subjective pain? yes  -CHANDRA      Pre-Treatment Pain Level 0  -CHANDRA      Post-Treatment Pain Level 0  -CHANDRA         Exercise 7    Exercise Name 7 Perch sit -- alt hip flex and alt knee ext   -CHANDRA      Cueing 7 Verbal;Tactile;Demo  -CHANDRA      Reps 7 15 each  -CHANDRA      Additional Comments Cues to sit up tall   -CHANDRA         Exercise 8    Exercise Name 8 B Bridges  -CHANDRA      Reps 8 10  -CHANDRA        User Key  (r) = Recorded By, (t) = Taken By, (c) = Cosigned By    Initials Name Provider Type    CHANDRA Yanez, PT Physical Therapist                            Therapy Education  Education Details: Again encouraged pt to continue getting out to ambulate in his patio complex. Worked on ambulation with cane. HEP for home: bridges, perch sitting   Given: HEP, Mobility training  How Provided: Verbal, Demonstration, Written  Provided to: Patient  Level of Understanding: Teach back education performed, Verbalized, Demonstrated              Time Calculation:   Start Time: 1340  Stop " Time: 1425  Time Calculation (min): 45 min  Total Timed Code Minutes- PT: 45 minute(s)   Therapy Suggested Charges     Code   Minutes Charges    None           Therapy Charges for Today     Code Description Service Date Service Provider Modifiers Qty    07742279258 HC PT NEUROMUSC RE EDUCATION EA 15 MIN 7/19/2018 Leslie Yanez, PT GP 3                    Leslie Yanez, PT  7/19/2018

## 2018-07-23 ENCOUNTER — APPOINTMENT (OUTPATIENT)
Dept: SPEECH THERAPY | Facility: HOSPITAL | Age: 78
End: 2018-07-23

## 2018-07-23 ENCOUNTER — HOSPITAL ENCOUNTER (OUTPATIENT)
Dept: PHYSICAL THERAPY | Facility: HOSPITAL | Age: 78
Setting detail: THERAPIES SERIES
Discharge: HOME | End: 2018-07-23
Attending: PHYSICAL MEDICINE & REHABILITATION

## 2018-07-23 DIAGNOSIS — I63.9 CEREBROVASCULAR ACCIDENT (CVA), UNSPECIFIED MECHANISM (HCC): Primary | ICD-10-CM

## 2018-07-23 DIAGNOSIS — R26.89 FUNCTIONAL GAIT ABNORMALITY: ICD-10-CM

## 2018-07-23 PROCEDURE — 97112 NEUROMUSCULAR REEDUCATION: CPT

## 2018-07-23 NOTE — PROGRESS NOTES
PPS CMG Coordinator  Inpatient Rehabilitation Discharge    Mode of Locomotion: Walking.    Discharge Against Medical Advice:  No.  Discharge Information  Patient Discharged Alive:  Yes  Discharge Destination/Living Setting: Home.  At discharge, the patient was discharged to live (with) (02)  Family / Relatives    Diagnosis for Interruption/Death: ICD    Impairment Group: Stroke: 01.1 Left Body Involvement (Right Brain)    Comorbidities: ICD    Complications: ICD      DAVEY Bladder Accidents: 3  - Accidents.  Patient used medications/device this  shift.  7/3/2018 6:20:00 AM  Bladder Score = 6. Patient has not had an accident, but uses a  device/medication.  DAVEY Bowel Accident: 1  - Accidents.  Patient used medications/device this shift.   6/29/2018 10:07:00 AM  Bowel Score = 7. Patient has no accidents.      QUALITY INDICATORS  Health Conditions: Fall(s) Since Admission:  Yes  Number of Falls with No Injury: None.  Number of Falls with Injury (except major):  One  Number of Falls with Major Injury:  None.  Section M. Skin Conditions Discharge:  Unhealed Pressure Ulcer(s) at Stage 1 or  Higher:  No    . Current Number of Unhealed Pressure Ulcers  Branch    . Worsening in Pressure Ulcer Status Since Admission:  Branch    . Healed Pressure Ulcer(s):    Number of Healed Stage 1: 0  Number of Healed Stage 2: 0  Number of Healed Stage 3: 0  Number of Healed Stage 4: 0    . Influenza Vaccine - Discharge  Received in this facility for this year's influenza vaccination season:  No.  Influenza Vaccine Not Received Due To: Patient not in this facility during this  year's influenza vaccination season.    Date Influenza Vaccine Received (if applicable)  Text Entry    Signed by: Cj Baker RN

## 2018-07-23 NOTE — PROGRESS NOTES
Per staff report.  SECTION GG      Self Care Performance Discharge:   Oral Hygiene: Thompson sets up or cleans up; patient completes activity. Thompson  assists only prior to or following the activity.   Toileting Hygiene: : Thompson sets up or cleans up; patient completes activity.  Thompson assists only prior to or following the activity.   Shower/Bathe Self: Thompson sets up or cleans up; patient completes activity.  Thompson assists only prior to or following the activity.   Upper Body Dressing: Thompson sets up or cleans up; patient completes activity.  Thompson assists only prior to or following the activity.   Lower Body Dressing: Thompson sets up or cleans up; patient completes activity.  Thompson assists only prior to or following the activity.   Putting On/Taking Off Footwear: Thompson sets up or cleans up; patient completes  activity. Thompson assists only prior to or following the activity.    Mobility Toilet Transfer Discharge: Thompson sets up or cleans up; patient  completes activity. Thompson assists only prior to or following the activity.    Signed by: Cj Baker RN

## 2018-07-23 NOTE — THERAPY TREATMENT NOTE
Outpatient Physical Therapy Neuro Treatment Note  [facility]     Patient Name: Martín Souza  : 1940  MRN: [id number]  Today's Date: 2018      Visit Date: 2018    Visit Dx:    ICD-10-CM ICD-9-CM   1. Cerebrovascular accident (CVA), unspecified mechanism (CMS/[facility]) I63.9 434.91   2. Functional gait abnormality R26.89 781.2       Patient Active Problem List   Diagnosis   • CVA (cerebral vascular accident) (CMS/[facility])                 PT Neuro     Row Name 18 1145             Precautions and Contraindications    Precautions/Limitations fall precautions;swallowing precautions  -LB         Subjective Pain    Able to rate subjective pain? yes  -LB      Pre-Treatment Pain Level 0  -LB      Post-Treatment Pain Level 0  -LB         Cognition    Memory Appears intact  -LB      Safety Judgment Decreased awareness of need for safety  -LB      Deficits Decreased awareness of deficits  -LB      Comments pt continues to be impulsive, does not line up with chairs prior to sitting  -LB         Transfers    Sit-Stand [place] (Transfers) verbal cues;stand by assist   cues for hand placement  -LB      Stand-Sit [place] (Transfers) verbal cues;stand by assist   cues reach back and line up with chair  -LB      Transfer, Safety Issues sequencing ability decreased;weight-shifting ability decreased;impulsivity  -LB      Transfer, Impairments strength decreased;impaired balance  -LB      Comment (Transfers) The patient needed vc for hand placement with the cane at least 50 % of the time, slighty irritated at the reminders  -LB         Gait/Stairs Assessment/Training    [place] Level (Gait) contact guard;verbal cues  -LB      Assistive Device (Gait) cane, tripod  -LB      Distance in Feet (Gait) 160, 100, 50 times 2  -LB      Deviations/Abnormal Patterns (Gait) base of support, wide;other (see comments)   cues to slow down  -LB      Bilateral Gait Deviations weight shift ability decreased;forward  flexed posture  -LB      [place] Level (Stairs) contact guard  -LB      Assistive Device (Stairs) cane, straight  -LB      Handrail Location (Stairs) right side (ascending)  -LB      Number of Steps (Stairs) 12  -LB      Ascending Technique (Stairs) step-over-step  -LB      Descending Technique (Stairs) step-over-step  -LB      Stairs, Safety Issues sequencing ability decreased  -LB      Stairs, Impairments strength decreased;impaired balance  -LB      Comment (Gait/Stairs) When negogiating the steps with HR and cane, pt with poor sequencing abilitites.  Peformed 12 with right handrail and CGA.  Pt did better with placing entire foot on step.  When turning to enter the stair hallway, the patient turned quickly and reached for the door frame for stability due to LOB  -LB         Balance Skills Training    Standing-Level of Assistance Contact guard;Minimum assistance  -LB      Static Standing Balance Support Right upper extremity supported;No upper extremity supported  -LB      Standing-Balance Activities Standing on 1/2 roll;Semi-tandum;Compliant surfaces  -LB      Standing Balance # of Minutes 4  -LB      Balance Comments While standing on foam square in semi-tandem stance pt performed alternating shoulder flexion and weight shifting with looking back over each shoulder  -LB        User Key  (r) = Recorded By, (t) = Taken By, (c) = Cosigned By    Initials Name Provider Type    RIKKI Abraham PT Physical Therapist                        PT Assessment/Plan     Row Name 07/23/18 1257          PT Assessment    Assessment Comments Pt still at risk for falls and not safe to use cane independently yet.  Pt displaying minimal LOB while using cane and pt agreed when pointed out to him.    -LB       User Key  (r) = Recorded By, (t) = Taken By, (c) = Cosigned By    Initials Name Provider Type    RIKKI Abraham PT Physical Therapist                     Exercises     Row Name 07/23/18 1145             Precautions     "Existing Precautions/Restrictions fall  -LB         Subjective Comments    Subjective Comments \" I just want to get through this\"  No new issues at home, feels he is doing well.  Wife present and concerned about his balance  -LB         Subjective Pain    Able to rate subjective pain? yes  -LB      Pre-Treatment Pain Level 0  -LB      Post-Treatment Pain Level 0  -LB         Exercise 5    Exercise Name 5 Hemibar: L knee flexion and B heel raises   -LB      Sets 5 2  -LB      Reps 5 10  -LB         Exercise 9    Exercise Name 9 min- squats and hip abduction at hemibars  -LB      Sets 9 2  -LB      Reps 9 10  -LB        User Key  (r) = Recorded By, (t) = Taken By, (c) = Cosigned By    Initials Name Provider Type    LB Dulce Abraham, PT Physical Therapist                            Therapy Education  Given: Fall prevention and home safety  Program: Reinforced  Provided to: Patient, Caregiver  Level of Understanding: Verbalized              Time Calculation:   Start Time: 1100  Stop Time: 1146  Time Calculation (min): 46 min  Total Timed Code Minutes- PT: 44 minute(s)   Therapy Suggested Charges     Code   Minutes Charges    None           Therapy Charges for Today     Code Description Service Date Service Provider Modifiers Qty    [id number]  PT NEUROMUSC RE EDUCATION EA 15 MIN 7/23/2018 Dulce Abraham, PT GP 3                    Dulce Abraham PT  7/23/2018     "

## 2018-07-26 ENCOUNTER — APPOINTMENT (OUTPATIENT)
Dept: SPEECH THERAPY | Facility: HOSPITAL | Age: 78
End: 2018-07-26

## 2018-07-26 ENCOUNTER — APPOINTMENT (OUTPATIENT)
Dept: PHYSICAL THERAPY | Facility: HOSPITAL | Age: 78
End: 2018-07-26
Attending: PHYSICAL MEDICINE & REHABILITATION

## 2018-07-30 ENCOUNTER — HOSPITAL ENCOUNTER (OUTPATIENT)
Dept: PHYSICAL THERAPY | Facility: HOSPITAL | Age: 78
Setting detail: THERAPIES SERIES
Discharge: HOME | End: 2018-07-30
Attending: PHYSICAL MEDICINE & REHABILITATION

## 2018-07-30 ENCOUNTER — APPOINTMENT (OUTPATIENT)
Dept: SPEECH THERAPY | Facility: HOSPITAL | Age: 78
End: 2018-07-30

## 2018-07-30 DIAGNOSIS — I63.9 CEREBROVASCULAR ACCIDENT (CVA), UNSPECIFIED MECHANISM (HCC): Primary | ICD-10-CM

## 2018-07-30 DIAGNOSIS — R26.89 FUNCTIONAL GAIT ABNORMALITY: ICD-10-CM

## 2018-07-30 PROCEDURE — 97112 NEUROMUSCULAR REEDUCATION: CPT

## 2018-07-30 NOTE — THERAPY TREATMENT NOTE
"    Outpatient Physical Therapy Neuro Treatment Note  [facility]     Patient Name: Martín Souza  : 1940  MRN: [id number]  Today's Date: 2018      Visit Date: 2018    Visit Dx:    ICD-10-CM ICD-9-CM   1. Cerebrovascular accident (CVA), unspecified mechanism (CMS/[facility]CC) I63.9 434.91   2. Functional gait abnormality R26.89 781.2       Patient Active Problem List   Diagnosis   • CVA (cerebral vascular accident) (CMS/HCC)                 PT Neuro     Row Name 18 1102             Subjective Comments    Subjective Comments \"You have 6 sessions to get done with me.\"  -CHANDRA         Precautions and Contraindications    Precautions/Limitations fall precautions;swallowing precautions  -CHANDRA         Subjective Pain    Able to rate subjective pain? yes  -CHANDRA      Pre-Treatment Pain Level 0  -CHANDRA      Post-Treatment Pain Level 0  -CHANDRA         Posture/Observations    Posture/Observations Comments Pt is arrived to therapy with rolling walker.   -CHANDRA         Transfers    Sit-Stand [place] (Transfers) verbal cues  -CHANDRA      Stand-Sit [place] (Transfers) verbal cues  -CHANDRA      Transfers, Sit-Stand-Sit, Assist Device straight cane   with Able Tripod base  -CHANDRA      Transfer, Safety Issues impulsivity;sequencing ability decreased  -CHANDRA      Transfer, Impairments strength decreased;impaired balance  -CHANDRA      Comment (Transfers) cues for hand placement with cane  -CHANDRA         Gait/Stairs Assessment/Training    [place] Level (Gait) contact guard;stand by assist  -CHANDRA      Assistive Device (Gait) cane, tripod  -CHANDRA      Distance in Feet (Gait) 200' x 2; 175' x 3  -CHANDRA      Deviations/Abnormal Patterns (Gait) base of support, wide;other (see comments)   cues to slow down  -CHANDRA      Bilateral Gait Deviations weight shift ability decreased  -CHANDRA      [place] Level (Stairs) contact guard;stand by assist  -CHANDRA      Assistive Device (Stairs) cane, straight  -CHANDRA      Handrail Location (Stairs) right side (ascending)  " "-CHANDRA      Number of Steps (Stairs) 4  -CHANDRA      Ascending Technique (Stairs) step-over-step  -CHANDRA      Descending Technique (Stairs) step-over-step  -CHANDRA      Comment (Gait/Stairs) Curb: cane, CG, cues for cane placement; wife assisted with ambulation and curb via HHA in case pt fatigues or needs some assist.   -CHANDRA         Balance Skills Training    Gait Balance-Level of Assistance Contact guard  -CHANDRA      Gait Balance Support assistive device   loss of balance with turns  -CHANDRA      Gait Balance Activities scanning environment R/L   passing ball; negotiate around cones  -CHANDRA        User Key  (r) = Recorded By, (t) = Taken By, (c) = Cosigned By    Initials Name Provider Type    CHANDRA Yanez, PT Physical Therapist                        PT Assessment/Plan     Row Name 07/30/18 1216          PT Assessment    Assessment Comments Pt's endurance is much improved and he is ambulating for most part at safe alda with cane. His balance with cane is challenged with turns, head turns. He is ready to begin some ambulation at home using cane during day hours in his home and patio complex with his wife.   -CHANDRA       User Key  (r) = Recorded By, (t) = Taken By, (c) = Cosigned By    Initials Name Provider Type    CHANDRA Yanez, PT Physical Therapist                     Exercises     Row Name 07/30/18 1102             Subjective Comments    Subjective Comments \"You have 6 sessions to get done with me.\"  -CHANDRA         Subjective Pain    Able to rate subjective pain? yes  -CHANDRA      Pre-Treatment Pain Level 0  -CHANDRA      Post-Treatment Pain Level 0  -CHANDRA         Exercise 3    Exercise Name 3 Steps:  alternate tapping feet on 6\" step   -CHANDRA      Reps 3 20  -CHANDRA         Exercise 4    Exercise Name 4 Hemibar: forward step with each LE 6\" step   -CHANDRA         Exercise 5    Exercise Name 5 Hemibar: L knee flexion and B heel raises   -CHANDRA      Reps 5 20  -CHANDRA        User Key  (r) = Recorded By, (t) = Taken By, (c) = Cosigned By    Initials Name " Provider Type    CHANDRA Leslie Yanez, PT Physical Therapist                            Therapy Education  Education Details: Wife shown HHA for pt when he uses cane in the community. Ambulation with cane. Instructed pt he could purchase off set cane; can ambulate at home during the day in his home and with wife in patio complex. Use walker in morning, evening and at night. Cues to slow down with ambulation.   Given: Fall prevention and home safety, Mobility training  How Provided: Verbal, Demonstration  Provided to: Patient, Caregiver  Level of Understanding: Teach back education performed, Verbalized, Demonstrated              Time Calculation:   Start Time: 1102  Stop Time: 1146  Time Calculation (min): 44 min  Total Timed Code Minutes- PT: 44 minute(s)   Therapy Suggested Charges     Code   Minutes Charges    None           Therapy Charges for Today     Code Description Service Date Service Provider Modifiers Qty    [id number] HC PT NEUROMUSC RE EDUCATION EA 15 MIN 7/30/2018 Leslie Yanez, PT GP 3                    Leslie Yanez PT  7/30/2018

## 2018-08-02 ENCOUNTER — APPOINTMENT (OUTPATIENT)
Dept: PHYSICAL THERAPY | Facility: HOSPITAL | Age: 78
End: 2018-08-02
Attending: PHYSICAL MEDICINE & REHABILITATION

## 2018-08-02 ENCOUNTER — APPOINTMENT (OUTPATIENT)
Dept: SPEECH THERAPY | Facility: HOSPITAL | Age: 78
End: 2018-08-02

## 2018-08-06 ENCOUNTER — HOSPITAL ENCOUNTER (OUTPATIENT)
Dept: PHYSICAL THERAPY | Facility: HOSPITAL | Age: 78
Setting detail: THERAPIES SERIES
Discharge: HOME | End: 2018-08-06
Attending: PHYSICAL MEDICINE & REHABILITATION

## 2018-08-06 ENCOUNTER — APPOINTMENT (OUTPATIENT)
Dept: SPEECH THERAPY | Facility: HOSPITAL | Age: 78
End: 2018-08-06

## 2018-08-06 ENCOUNTER — HOSPITAL ENCOUNTER (OUTPATIENT)
Dept: GENERAL RADIOLOGY | Facility: HOSPITAL | Age: 78
Discharge: HOME | End: 2018-08-06
Attending: PHYSICAL MEDICINE & REHABILITATION | Admitting: PHYSICAL MEDICINE & REHABILITATION

## 2018-08-06 DIAGNOSIS — R26.89 FUNCTIONAL GAIT ABNORMALITY: ICD-10-CM

## 2018-08-06 DIAGNOSIS — R13.12 OROPHARYNGEAL DYSPHAGIA: ICD-10-CM

## 2018-08-06 DIAGNOSIS — I63.9 CEREBROVASCULAR ACCIDENT (CVA), UNSPECIFIED MECHANISM (HCC): Primary | ICD-10-CM

## 2018-08-06 PROCEDURE — G8978 MOBILITY CURRENT STATUS: HCPCS

## 2018-08-06 PROCEDURE — G8997 SWALLOW GOAL STATUS: HCPCS

## 2018-08-06 PROCEDURE — 97112 NEUROMUSCULAR REEDUCATION: CPT

## 2018-08-06 PROCEDURE — 92611 MOTION FLUOROSCOPY/SWALLOW: CPT

## 2018-08-06 PROCEDURE — A9270 NON-COVERED ITEM OR SERVICE: HCPCS | Performed by: PHYSICAL MEDICINE & REHABILITATION

## 2018-08-06 PROCEDURE — G8998 SWALLOW D/C STATUS: HCPCS

## 2018-08-06 PROCEDURE — 74230 X-RAY XM SWLNG FUNCJ C+: CPT

## 2018-08-06 PROCEDURE — G8996 SWALLOW CURRENT STATUS: HCPCS

## 2018-08-06 PROCEDURE — 63710000001 BARIUM SULFATE 98 % RECONSTITUTED SUSPENSION: Performed by: PHYSICAL MEDICINE & REHABILITATION

## 2018-08-06 PROCEDURE — 63710000001 BARIUM SULFATE 40 % SUSPENSION: Performed by: PHYSICAL MEDICINE & REHABILITATION

## 2018-08-06 PROCEDURE — 63710000001 BARIUM SULFATE 96 % RECONSTITUTED SUSPENSION: Performed by: PHYSICAL MEDICINE & REHABILITATION

## 2018-08-06 PROCEDURE — G8979 MOBILITY GOAL STATUS: HCPCS

## 2018-08-06 RX ADMIN — BARIUM SULFATE 4 ML: 980 POWDER, FOR SUSPENSION ORAL at 10:50

## 2018-08-06 RX ADMIN — BARIUM SULFATE 130 ML: 960 POWDER, FOR SUSPENSION ORAL at 10:49

## 2018-08-06 RX ADMIN — BARIUM SULFATE 50 ML: 400 SUSPENSION ORAL at 10:50

## 2018-08-06 NOTE — THERAPY PROGRESS REPORT/RE-CERT
"    Outpatient Physical Therapy Neuro Progress Note  [facility]     Patient Name: Martín Souza  : 1940  MRN: [id number]  Today's Date: 2018      Visit Date: 2018    Visit Dx:    ICD-10-CM ICD-9-CM   1. Cerebrovascular accident (CVA), unspecified mechanism (CMS/[facility]) I63.9 434.91   2. Functional gait abnormality R26.89 781.2       Patient Active Problem List   Diagnosis   • CVA (cerebral vascular accident) (CMS/[facility])                 PT Neuro     Row Name 18 1102             Subjective Comments    Subjective Comments Pt said he bought a cane since last session. He uses that and many times walks at home without device. He reaches out to the wall to recover his loss of balance which has happened some without using cane. Pt said he uses his walker \"some\". Wife admits it makes her nervous when pt loses his balance while walking. Did not come for therapy last Thursday because felt dizzy and tired, saw MD who felt it was his Remeron. Pt is working on getting off that medicine now.   -CHANDRA         Precautions and Contraindications    Precautions/Limitations fall precautions;swallowing precautions  -CHANDRA         Subjective Pain    Subjective Pain Comment did not mention pain   -CHANDRA         Cognition    Overall Cognitive Status WFL  -CHANDRA      Arousal/Alertness Appropriate responses to stimuli  -CHANDRA      Memory Appears intact  -CHANDRA      Orientation Level Oriented X4  -CHANDRA      Safety Judgment Decreased awareness of need for safety  -CHANDRA      Deficits Decreased awareness of deficits  -CHANDRA      Comments Pt continues to think he is making progress if he ambulates without cane even if he loses his balance.   -CHANDRA         Posture/Observations    Posture/Observations Comments Pt is arrived to therapy with rolling walker.   -CHANDRA         Transfers    Sit-Stand [place] (Transfers) conditional independence  -CHANDRA      Stand-Sit [place] (Transfers) conditional independence  -CHANDRA      Transfers, Sit-Stand-Sit, Assist " Device straight cane;rolling walker  -CHANDRA      Transfer, Safety Issues impulsivity  -CHANDRA      Transfer, Impairments strength decreased;impaired balance  -CHANDRA         Gait/Stairs Assessment/Training    [place] Level (Gait) stand by assist  -CHANDRA      Assistive Device (Gait) cane, tripod  -CHANDRA      Distance in Feet (Gait) 180' x 2; 100  -CHANDRA      Deviations/Abnormal Patterns (Gait) --   cues to slow down; occas weave while turning head  -CHANDRA      Bilateral Gait Deviations weight shift ability decreased   LOB with turn x 1   -CHANDRA      [place] Level (Stairs) stand by assist;verbal cues   change hands holding cane so he could hold rail down  -CHANDRA      Assistive Device (Stairs) cane, straight   with able tripod base  -CHANDRA      Handrail Location (Stairs) right side (ascending);left side (ascending)  -CHANDRA      Number of Steps (Stairs) 4  -CHANDRA      Ascending Technique (Stairs) step-over-step  -CHANDRA      Descending Technique (Stairs) step-over-step  -CHANDRA      Stairs, Safety Issues sequencing ability decreased  -CHANDRA      Stairs, Impairments strength decreased;impaired balance  -CHANDRA      [place] Level (Ramp) stand by assist   LOB with turn at top, stopped and recovered   -CHANDRA      Assistive Device (Ramp) cane, straight   with Able tripod base   -CHANDRA      Comment (Gait/Stairs) Pt wanted to show PT how he could ambulate without cane and he immediately had LOB to L with crossover step to recover and then continued on without being sure he had his balance and then weaved more 20' x 1. Pt ambulated with rolling walker 80'x 1 indep. Practiced with using cane - turns and turning head.   -CHANDRA         Balance Skills Training    Gait Balance-Level of Assistance Contact guard  -CHANDRA      Gait Balance Support Right upper extremity supported;Left upper extremity supported  -CHANDRA      Gait Balance Activities backwards;[place];side-stepping;tandem;scanning environment R/L;stepping over object   scanning, step over hurdles - used cane; toss ball   -CHANDRA  "       User Key  (r) = Recorded By, (t) = Taken By, (c) = Cosigned By    Initials Name Provider Type    Leslie Casey, PT Physical Therapist                        PT Assessment/Plan     Row Name 08/06/18 1402          PT Assessment    Assessment Comments Pt has improved with his balance with 10 point increased score on the MENDOZA balance test 42/56 and 2 second decreased time with TUG and used cane today. Pt has progressed from using rolling walker at all times to using cane with ABle Tripod base some now. However, pt is trying to advance himself off his cane and ambulate without a device and he is clearly not safe without a device. He had lost of balance immediately without device. Pt has impulsivity and sometimes feel he can do more than he can safely which can place him at risk for falls. For now he should use cane or walker at all times. Pt will benefit from ongoing outpt PT for strengthening, ambulation and balance.   -CHANDRA       User Key  (r) = Recorded By, (t) = Taken By, (c) = Cosigned By    Initials Name Provider Type    Leslie Casey PT Physical Therapist                     Exercises     Row Name 08/06/18 1102             Subjective Comments    Subjective Comments Pt said he bought a cane since last session. He uses that and many times walks at home without device. He reaches out to the wall to recover his loss of balance which has happened some without using cane. Pt said he uses his walker \"some\". Wife admits it makes her nervous when pt loses his balance while walking. Did not come for therapy last Thursday because felt dizzy and tired, saw MD who felt it was his Remeron. Pt is working on getting off that medicine now.   -CHANDRA         Subjective Pain    Subjective Pain Comment did not mention pain   -CHANDRA        User Key  (r) = Recorded By, (t) = Taken By, (c) = Cosigned By    Initials Name Provider Type    Leslie Casey PT Physical Therapist                              PT OP Goals     " Row Name 08/06/18 1300          PT Short Term Goals    STG Date to Achieve 09/06/18  -     STG 1 Pt will transfer sit to stand and stand to sit slowly and correctly without hold onto device and turn fully prior to sit 90% of the time.   -CHANDRA     STG 1 Progress Met  -     STG 2 Pt will be independent with HEP to improve strength and balance.   -CHANDRA     STG 2 Progress Ongoing;Progressing  -     STG 3 Pt will be able to stand without assistive device for 2 minutes SBA while batting balloon or catching ball for improved standing balance.   -CHANDRA     STG 3 Progress Met  -     STG 4 Pt will perform Rhomberg eyes open for 1 minute independently.   -CHANDRA     STG 4 Progress Ongoing  -     STG 5 Pt will be able to ambulate with cane independently while turning head side to side for 100'.   -     STG 5 Progress Met;Goal Revised  -     STG 5 Progress Comments Pt can safely, indep turn head with rolling walker but CG with cane.   -     STG 6 Pt to score at least 38/56 on the MENDOZA balance test to demonstrate improved balance and decreased fall risk.   -     STG 6 Progress Met  -     STG 6 Progress Comments scored 42/56 on retest today   -        Long Term Goals    LTG Date to Achieve 09/17/18  -CHANDRA     LTG 1 Pt to score at least 45/56 on the MENDOZA balance test to demonstrate improved balance and decreased fall risk.   -CHANDRA     LTG 1 Progress Ongoing;Progressing  -     LTG 2 Pt will complete the TUG test in less than 11 seconds using cane for improved functional mobility and decreased fall risk.   -CHANDRA     LTG 2 Progress Ongoing;Progressing  -     LTG 2 Progress Comments completed TUG with cane in 14 seconds, 2 seconds faster today   -     LTG 3 Pt will ambulate in the community with least restrictive device up to 400' independently.   -CHANDRA     LTG 3 Progress Ongoing;Progressing  -     LTG 3 Progress Comments still using rolling walker but is ambulating in his neighborhood   -     LTG 4 Pt will ascend/descend  "6\" curb independently with cane.   -CHANDRA     LTG 4 Progress Ongoing;Progressing  -CHANDRA     LTG 5 Pt to ascend/descend 4 steps with rail and cane independently.   -CHANDRA     LTG 5 Progress Ongoing;Progressing  -CHANDRA     LTG 5 Progress Comments SBA and cues  -CHANDRA     LTG 6 Pt to have increased strength L hip flexion and abduction by 1/2 muscle grade.   -CHANDRA     LTG 6 Progress Ongoing  -CHANDRA     LTG 7 Pt to have increased strength L knee flexion to 4/5.   -CHANDRA     LTG 7 Progress Ongoing  -CHANDRA        Time Calculation    PT Goal Re-Cert Due Date 09/06/18  -CHANDRA       User Key  (r) = Recorded By, (t) = Taken By, (c) = Cosigned By    Initials Name Provider Type    Leslie Casey, PT Physical Therapist               Outcome Measure Options: Siddiqui Balance, Timed Up and Go (TUG)  Siddiuqi Balance Scale  Sitting to Standing: able to stand without using hands and stabilize independently  Standing Unsupported: able to stand safely for 2 minutes  Sitting with Back Unsupported but Feet Supported on Floor or on Stool: able to sit safely and securely for 2 minutes  Standing to Sitting: controls descent by using hands  Transfers: able to transfer safely with minor use of hands  Standing Unsupported with Eyes Closed: able to stand 10 seconds safely  Standing Unsupported with Feet Together: able to place feet together independently but unable to hold for 30 seconds  Reaching Forward with Outstretched Arm While Standing: can reach forward confidently 25 cm (10 inches)   Object From the Floor From a Standing Position: able to  object but needs supervision  Turning to Look Behind Over Left and Right Shoulders While Standing: looks behind one side only other side shows less weight shift  Turn 360 Degrees: able to turn 360 degrees safely but slowly  Place Alternate Foot on Step or Stool While Standing Unsupported: able to complete 4 steps without aid with supervision  Standing Unsupported with One Foot in Front: able to take small step " independently and hold 30 seconds  Standing on One Leg: tries to lift leg unable to hold 3 seconds but remains standing independently  Siddiqui Total Score: 42  Siddiqui Comments: without device   Timed Up and Go (TUG)  TUG Test 1: 14 seconds (using cane with Tripod Base. )      Time Calculation:   Start Time: 1102  Stop Time: 1152  Time Calculation (min): 50 min  Total Timed Code Minutes- PT: 50 minute(s)   Therapy Suggested Charges     Code   Minutes Charges    None           Therapy Charges for Today     Code Description Service Date Service Provider Modifiers Qty    29367244538 HC PT MOBILITY CURRENT 8/6/2018 Leslie Yanez, PT GP, CJ 1    59223822908 HC PT MOBILITY PROJECTED 8/6/2018 Leslie Yanez, PT GP, CI 1    [id number] HC PT NEUROMUSC RE EDUCATION EA 15 MIN 8/6/2018 Leslie Yanez, PT GP 3          PT G-Codes  PT Professional Judgement Used?: Yes  Outcome Measure Options: Siddiqui Balance, Timed Up and Go (TUG)  Functional Limitation: Mobility: Walking and moving around  Mobility: Walking and Moving Around Current Status (): At least 20 percent but less than 40 percent impaired, limited or restricted  Mobility: Walking and Moving Around Goal Status (): At least 1 percent but less than 20 percent impaired, limited or restricted         Leslie Yanez, PT  8/6/2018

## 2018-08-06 NOTE — THERAPY PROGRESS REPORT/RE-CERT
"    Outpatient Physical Therapy Neuro Progress Note  [facility]     Patient Name: Martín Souza  : 1940  MRN: [id number]  Today's Date: 2018      Visit Date: 2018    Visit Dx:    ICD-10-CM ICD-9-CM   1. Cerebrovascular accident (CVA), unspecified mechanism (CMS/[facility]) I63.9 434.91   2. Functional gait abnormality R26.89 781.2       Patient Active Problem List   Diagnosis   • CVA (cerebral vascular accident) (CMS/[facility])                 PT Neuro     Row Name 18 1102             Subjective Comments    Subjective Comments Pt said he bought a cane since last session. He uses that and many times walks at home without device. He reaches out to the wall to recover his loss of balance which has happened some without using cane. Pt said he uses his walker \"some\". Wife admits it makes her nervous when pt loses his balance while walking. Did not come for therapy last Thursday because felt dizzy and tired, saw MD who felt it was his Remeron. Pt is working on getting off that medicine now.   -CHANDRA         Precautions and Contraindications    Precautions/Limitations fall precautions;swallowing precautions  -CHANDRA         Subjective Pain    Subjective Pain Comment did not mention pain   -CHANDRA         Cognition    Overall Cognitive Status WFL  -CHANDRA      Arousal/Alertness Appropriate responses to stimuli  -CHANDRA      Memory Appears intact  -CHANDRA      Orientation Level Oriented X4  -CHANDRA      Safety Judgment Decreased awareness of need for safety  -CHANDRA      Deficits Decreased awareness of deficits  -CHANDRA      Comments Pt continues to think he is making progress if he ambulates without cane even if he loses his balance.   -CHANDRA         Posture/Observations    Posture/Observations Comments Pt is arrived to therapy with rolling walker.   -CHANDRA         Transfers    Sit-Stand [place] (Transfers) conditional independence  -CHANDRA      Stand-Sit [place] (Transfers) conditional independence  -CHANDRA      Transfers, Sit-Stand-Sit, Assist " Device straight cane;rolling walker  -CHANDRA      Transfer, Safety Issues impulsivity  -CHANDRA      Transfer, Impairments strength decreased;impaired balance  -CHANDRA         Gait/Stairs Assessment/Training    [place] Level (Gait) stand by assist  -CHANDRA      Assistive Device (Gait) cane, tripod  -CHANDRA      Distance in Feet (Gait) 180' x 2; 100  -CHANDRA      Deviations/Abnormal Patterns (Gait) --   cues to slow down; occas weave while turning head  -CHANDRA      Bilateral Gait Deviations weight shift ability decreased   LOB with turn x 1   -CHANDRA      [place] Level (Stairs) stand by assist;verbal cues   change hands holding cane so he could hold rail down  -CHANDRA      Assistive Device (Stairs) cane, straight   with able tripod base  -CHANDRA      Handrail Location (Stairs) right side (ascending);left side (ascending)  -CHANDRA      Number of Steps (Stairs) 4  -CHANDRA      Ascending Technique (Stairs) step-over-step  -CHANDRA      Descending Technique (Stairs) step-over-step  -CHANDRA      Stairs, Safety Issues sequencing ability decreased  -CHANDRA      Stairs, Impairments strength decreased;impaired balance  -CHANDRA      [place] Level (Ramp) stand by assist   LOB with turn at top, stopped and recovered   -CHANDRA      Assistive Device (Ramp) cane, straight   with Able tripod base   -CHANDRA      Comment (Gait/Stairs) Pt wanted to show PT how he could ambulate without cane and he immediately had LOB to L with crossover step to recover and then continued on without being sure he had his balance and then weaved more 20' x 1. Pt ambulated with rolling walker 80'x 1 indep. Practiced with using cane - turns and turning head.   -CHANDRA         Balance Skills Training    Gait Balance-Level of Assistance Contact guard  -CHANDRA      Gait Balance Support Right upper extremity supported;Left upper extremity supported  -CHANDRA      Gait Balance Activities backwards;[place];side-stepping;tandem;scanning environment R/L;stepping over object   scanning, step over hurdles - used cane; toss ball   -CHANDRA  "       User Key  (r) = Recorded By, (t) = Taken By, (c) = Cosigned By    Initials Name Provider Type    Leslie Casey, PT Physical Therapist                        PT Assessment/Plan     Row Name 08/06/18 1402          PT Assessment    Assessment Comments Pt has improved with his balance with 10 point increased score on the MENDOZA balance test 42/56 and 2 second decreased time with TUG and used cane today. Pt has progressed from using rolling walker at all times to using cane with ABle Tripod base some now. However, pt is trying to advance himself off his cane and ambulate without a device and he is clearly not safe without a device. He had lost of balance immediately without device. Pt has impulsivity and sometimes feel he can do more than he can safely which can place him at risk for falls. For now he should use cane or walker at all times. Pt will benefit from ongoing outpt PT for strengthening, ambulation and balance.   -CHANDRA       User Key  (r) = Recorded By, (t) = Taken By, (c) = Cosigned By    Initials Name Provider Type    Leslie Casey, PT Physical Therapist                     Exercises     Row Name 08/06/18 1102             Subjective Comments    Subjective Comments Pt said he bought a cane since last session. He uses that and many times walks at home without device. He reaches out to the wall to recover his loss of balance which has happened some without using cane. Pt said he uses his walker \"some\". Wife admits it makes her nervous when pt loses his balance while walking. Did not come for therapy last Thursday because felt dizzy and tired, saw MD who felt it was his Remeron. Pt is working on getting off that medicine now.   -CHANDRA         Subjective Pain    Subjective Pain Comment did not mention pain   -CHANDRA         Exercise 1    Exercise Name 1 NuStep all extremities, level 3  -CHANDRA      Time 1 5 minutes   -CHANDRA        User Key  (r) = Recorded By, (t) = Taken By, (c) = Cosigned By    Initials Name " Provider Type    Leslie Casey, PT Physical Therapist                              PT OP Goals     Row Name 08/06/18 1300          PT Short Term Goals    STG Date to Achieve 09/06/18  -CHANDRA     STG 1 Pt will transfer sit to stand and stand to sit slowly and correctly without hold onto device and turn fully prior to sit 90% of the time.   -CHANDRA     STG 1 Progress Met  -CHANDRA     STG 2 Pt will be independent with HEP to improve strength and balance.   -CHANDRA     STG 2 Progress Ongoing;Progressing  -CHANDRA     STG 3 Pt will be able to stand without assistive device for 2 minutes SBA while batting balloon or catching ball for improved standing balance.   -CHANDRA     STG 3 Progress Met  -CHANDRA     STG 4 Pt will perform Rhomberg eyes open for 1 minute independently.   -CHANDRA     STG 4 Progress Ongoing  -CHANDRA     STG 5 Pt will be able to ambulate with cane independently while turning head side to side for 100'.   -CHANDRA     STG 5 Progress Met;Goal Revised  -CHANDRA     STG 5 Progress Comments Pt can safely, indep turn head with rolling walker but CG with cane.   -CHANDRA     STG 6 Pt to score at least 38/56 on the MENDOZA balance test to demonstrate improved balance and decreased fall risk.   -CHANDRA     STG 6 Progress Met  -CHANDRA     STG 6 Progress Comments scored 42/56 on retest today   -CHANDRA        Long Term Goals    LTG Date to Achieve 09/17/18  -CHANDRA     LTG 1 Pt to score at least 45/56 on the MENDOZA balance test to demonstrate improved balance and decreased fall risk.   -CHANDRA     LTG 1 Progress Ongoing;Progressing  -CHANDRA     LTG 2 Pt will complete the TUG test in less than 11 seconds using cane for improved functional mobility and decreased fall risk.   -CHANDRA     LTG 2 Progress Ongoing;Progressing  -CHANDRA     LTG 2 Progress Comments completed TUG with cane in 14 seconds, 2 seconds faster today   -CHANDRA     LTG 3 Pt will ambulate in the community with least restrictive device up to 400' independently.   -CHANDRA     LTG 3 Progress Ongoing;Progressing  -CHANDRA     LTG 3 Progress  "Comments still using rolling walker but is ambulating in his neighborhood   -CHANDRA     LTG 4 Pt will ascend/descend 6\" curb independently with cane.   -CHANDRA     LTG 4 Progress Ongoing;Progressing  -CHANDRA     LTG 5 Pt to ascend/descend 4 steps with rail and cane independently.   -CHANDRA     LTG 5 Progress Ongoing;Progressing  -CHANDRA     LTG 5 Progress Comments SBA and cues  -CHANDRA     LTG 6 Pt to have increased strength L hip flexion and abduction by 1/2 muscle grade.   -CHANDRA     LTG 6 Progress Ongoing  -CHANDRA     LTG 7 Pt to have increased strength L knee flexion to 4/5.   -CHANDRA     LTG 7 Progress Ongoing  -CHANDRA        Time Calculation    PT Goal Re-Cert Due Date 09/06/18  -CHANDRA       User Key  (r) = Recorded By, (t) = Taken By, (c) = Cosigned By    Initials Name Provider Type    Leslie Casey, PT Physical Therapist          Therapy Education  Education Details: Strongly encouraged pt to NOT ambulate without device but use walker or cane.   How Provided: Verbal  Provided to: Patient, Caregiver  Level of Understanding: Teach back education performed, Verbalized    Outcome Measure Options: Siddiqui Balance, Timed Up and Go (TUG)  Siddiqui Balance Scale  Sitting to Standing: able to stand without using hands and stabilize independently  Standing Unsupported: able to stand safely for 2 minutes  Sitting with Back Unsupported but Feet Supported on Floor or on Stool: able to sit safely and securely for 2 minutes  Standing to Sitting: controls descent by using hands  Transfers: able to transfer safely with minor use of hands  Standing Unsupported with Eyes Closed: able to stand 10 seconds safely  Standing Unsupported with Feet Together: able to place feet together independently but unable to hold for 30 seconds  Reaching Forward with Outstretched Arm While Standing: can reach forward confidently 25 cm (10 inches)   Object From the Floor From a Standing Position: able to  object but needs supervision  Turning to Look Behind Over Left and " Right Shoulders While Standing: looks behind one side only other side shows less weight shift  Turn 360 Degrees: able to turn 360 degrees safely but slowly  Place Alternate Foot on Step or Stool While Standing Unsupported: able to complete 4 steps without aid with supervision  Standing Unsupported with One Foot in Front: able to take small step independently and hold 30 seconds  Standing on One Leg: tries to lift leg unable to hold 3 seconds but remains standing independently  Siddiqui Total Score: 42  Siddiqui Comments: without device   Timed Up and Go (TUG)  TUG Test 1: 14 seconds (using cane with Tripod Base. )      Time Calculation:   Start Time: 1102  Stop Time: 1152  Time Calculation (min): 50 min  Total Timed Code Minutes- PT: 50 minute(s)   Therapy Suggested Charges     Code   Minutes Charges    None           Therapy Charges for Today     Code Description Service Date Service Provider Modifiers Qty    84049479315 HC PT MOBILITY CURRENT 8/6/2018 Leslie Yanez, PT GP, CJ 1    79868173892 HC PT MOBILITY PROJECTED 8/6/2018 Leslie Yanez, PT GP, CI 1    [id number] HC PT NEUROMUSC RE EDUCATION EA 15 MIN 8/6/2018 Leslie Yanez, PT GP 3          PT G-Codes  PT Professional Judgement Used?: Yes  Outcome Measure Options: Siddiqui Balance, Timed Up and Go (TUG)  Functional Limitation: Mobility: Walking and moving around  Mobility: Walking and Moving Around Current Status (): At least 20 percent but less than 40 percent impaired, limited or restricted  Mobility: Walking and Moving Around Goal Status (): At least 1 percent but less than 20 percent impaired, limited or restricted         Leslie Yanez, PT  8/6/2018

## 2018-08-06 NOTE — MBS/VFSS/FEES
Outpatient Speech Language Pathology   Adult Swallow Initial Evaluation  [facility]   VFSS     Patient Name: Martín Souza  : 1940  MRN: [id number]  Today's Date: 2018    Summary: Patient continues to demonstrate mild-moderate oropharyngeal dysphagia. He demonstrates penetration and aspiration of thin liquids, but is able to ameliorate with elimination of straw, and double swallow of foods before taking small sips of liquid. He did aspirate when he self-controlled liquids, and took a drink of thin liquids to assist in washing down regular texture. He also demonstrated continuing lingual, tongue base and pharyngeal weakness, with significant residue with mech soft and regular textures. He successfully used a double swallow to partially clear residue. He also had trace posterior aspiration of thin liquids from pyriform residue.   Patient would appear to be able to benefit from another course of dysphagia therapy. It is noted that he was discharged from therapy after only one session due to his stated intention of non-compliance with the diet. It is unclear whether he has been performing his exercises, as his swallow function appears the same, or slightly worse. It is also unclear whether he would cooperate with further therapy, as he was resistant during the entirety of this study to questions asked or information presented.     The patient would appear to be safest on mechanical soft - no mixed texture foods, and nectar thick liquids. However, due to the fact that he will likely be non-compliant, he was given instructions for regular/no mixed textures, thin liquids, no straw use, double swallow with all foods, and thin liquid wash after foods only if he has done a double swallow. His wife was present and indicated her understanding of all strategies.  He was also recommended to take medications with nectar thick liquids or puree, but stated he would continue to use water. He was counseled in the  "signs/symptoms of aspiration and urged to consult is MD if these were noted.      Visit Date: 08/06/2018   Patient Active Problem List   Diagnosis   • CVA (cerebral vascular accident) (CMS/[facility])        Past Medical History:   Diagnosis Date   • CKD (chronic kidney disease), stage III    • Coronary artery disease    • GERD (gastroesophageal reflux disease)    • Hypertension    • Kidney stones    • JENY on CPAP    • Stroke (CMS/[facility]) 06/09/2018    \"hemorrhage of R medulla oblongata\"         Past Surgical History:   Procedure Laterality Date   • APPENDECTOMY     • CARDIAC CATHETERIZATION      with cardiac stents          Visit Dx:     ICD-10-CM ICD-9-CM   1. Oropharyngeal dysphagia R13.12 787.22                 SLP Adult Swallow Evaluation - 08/06/18 1300        Rehab Evaluation    Document Type evaluation  -MT    Total Evaluation Minutes, SLP 60  -MT    Subjective Information no complaints  -MT    Patient Observations alert;cooperative  -MT    Patient/Family Observations Patient denied being on  modified diet at home. When asked reason for having swallow study today, he responded \"I'm here to have a swallowing study. Let's just do the study\".  -MT    Patient Effort good  -MT       General Information    Patient Profile Reviewed yes  -MT    Pertinent History Of Current Problem On 6/9/18 Mr Ye was hospitalized with left sided weakness and numbness. MRI revealed hemorrhage right medulla oblongata. Mr Ye participated in swallow and speech therapy, and most recent VFSS on 6/29/18 showed penetration with thin liquids due to mistiming, reduced epiglottic deflection, and BOT strength. Mild pharyngeal residue requiring multiple swallows to clear. It was recommended patient remain on mechanical soft no mixed and NTL.  -MT    Current Method of Nutrition regular textures   Pt denied being on modified diet, didn't state liquids level  -MT    Precautions/Limitations, Vision WFL with corrective lenses  -MT    " Precautions/Limitations, Hearing WFL;for purposes of eval  -MT    Prior Level of Function-Communication WFL  -MT    Prior Level of Function-Swallowing mechanical soft with no mixed consistencies;nectar thick liquids;water between meals   Patient denies being compliant  -MT    Plans/Goals Discussed with patient and family  -MT    Barriers to Rehab resistant to information  -MT    Patient's Goals for Discharge --   Patient declined to state  -MT    Family Goals for Discharge other (see comments)   Patient safe with diet  -MT       Oral Motor and Function    Dentition Assessment natural, present and adequate  -MT    Secretion Management WNL/WFL  -MT    Mucosal Quality moist, healthy  -MT    Volitional Swallow WFL  -MT    Volitional Cough WFL  -MT       Clinical Swallow Eval    Oral Prep Phase --  -MT       MBS/VFSS    Utensils Used spoon;cup;straw  -MT    Consistencies Trialed regular textures;soft textures;whole;pureed;thin liquids;nectar/syrup-thick liquids  -MT       MBS/VFSS Interpretation    Oral Prep Phase WFL  -MT    Oral Transit Phase impaired  -MT    Oral Residue impaired  -MT       Oral Transit Phase    Impaired Oral Transit Phase premature spillage of liquids into pharynx  -MT    Premature Spillage of Liquids into Pharynx thin liquids;nectar-thick liquids;secondary to reduced lingual control  -MT       Oral Residue    Impaired Oral Residue lingual residue  -MT    Lingual Residue pudding/puree;mixed consistency;mechanical soft;regular textures  -MT    Response to Oral Residue unable to clear residue;with spontaneous subsequent swallow;with cued swallow;with liquid wash  -MT    Oral Residue, Comment partially but not fully cleared  -MT       Initiation of Pharyngeal Swallow    Initiation of Pharyngeal Swallow bolus in valleculae  -MT    Pharyngeal Phase impaired pharyngeal phase of swallowing  -MT    Penetration Before the Swallow thin liquids;secondary to delayed swallow initiation or mistiming  -MT     Aspiration During the Swallow thin liquids;mechanical soft;secondary to reduced vestibular closure;secondary to reduced laryngeal (VF) closure  -MT    Aspiration After the Swallow thin liquids;secondary to residue;in pyriform sinuses  -MT    Response to Penetration no response  -MT    Response to Aspiration no response, silent aspiration;response with cue only  -MT    Rosenbek's Scale thin:;5--->level 5;mixed:;7--->level 7  -MT    Pharyngeal Residue pudding/puree;mechanical soft;regular textures;base of tongue;valleculae;secondary to reduced base of tongue retraction;secondary to reduced posterior pharyngeal wall stripping;secondary to reduced laryngeal elevation  -MT    Response to Residue unable to clear residue;cleared residue with spontaneous subsequent swallow;cleared residue with cued swallow;cleared residue with liquid wash   only partially cleared residue, needed cues at times  -MT    Attempted Compensatory Maneuvers bolus size;multiple swallows;alternative liquids/solids;throat clear after swallow  -MT    Pharyngeal Phase, Comment Primary factors to aspiration were use of straw and liquid wash after solid  -MT       SLP Communication to Radiology    Severity Level of Dysphagia mild-moderate dysphagia  -MT    Consistencies Aspirated/Penetrated penetrated and aspirated;thin liquids;mixed consistency  -MT    Summary Statement Mild-moderate dysphagia characterized by reduced oral control/transit with mistiming of swallow and spillage past vallecula before swallow, residue on tongue base and in vallecula, poor laryngeal vestibular closure and redued epiglottic inversion. Significant residue, greater with thicker consistentcies. Penetrated thin liquid by cup, deep penetration to vocal cords by straw. Aspirated thin liquids when used as a wash after cracker. Double swallow with foods to reduce residue and timing of liquid wash helped but patient still at risk. Appears to be significantly at risk with thin liquids  and mixed consistencies (solids plus thin liquid component)  -MT       Clinical Impression    SLP Swallowing Diagnosis mild;oral dysfunction;mild-moderate;pharyngeal dysfunction  -MT    Functional Impact risk of aspiration/pneumonia  -MT    Rehab Potential/Prognosis, Swallowing adequate, monitor progress closely  -MT    Criteria for Skilled Therapeutic Interventions Met demonstrates skilled criteria;other (see comments)   patient resistant to information   -MT       Recommendations    Therapy Frequency (Swallow) evaluation only  -MT    SLP Diet Recommendation regular textures;thin liquids  -MT    Recommended Precautions and Strategies upright posture during/after eating;small bites of food and sips of liquid;no straw;multiple swallows per bite of food;alternate between small bites of food and sips of liquid  -MT    SLP Rec. for Method of Medication Administration meds whole;with thick liquids;with pudding or applesauce  -MT    Monitor for Signs of Aspiration yes;notify SLP if any concerns  -MT    Anticipated Dischage Disposition home  -MT      User Key  (r) = Recorded By, (t) = Taken By, (c) = Cosigned By    Initials Name Provider Type    MT Abbie Zapata MS CCC-SLP Speech and Language Pathologist                                  SLP OP Goals     Row Name 08/06/18 1300          Time Calculation    PT Goal Re-Cert Due Date 09/06/18  -CHANDRA       User Key  (r) = Recorded By, (t) = Taken By, (c) = Cosigned By    Initials Name Provider Type    Leslie Casey, PT Physical Therapist                        Time Calculation:        Therapy Charges for Today     Code Description Service Date Service Provider Modifiers Qty    20355761341 HC ST SWALLOWING CURRENT STATUS 8/6/2018 Abbie Zapata MS CCC-SLP LISA, CK 1    22075252218 HC ST SWALLOWING PROJECTED 8/6/2018 Abbie Zapata MS CCC-SLP GN, CK 1    75933849754 HC ST SWALLOWING DISCHARGE 8/6/2018 Abbie Zapata MS CCC-SLP GN, CK 1    13574650486 HC ST MOTION  FLUORO EVAL SWALLOW 4 8/6/2018 Abbie Zapata MS CCC-SLP GN 1          SLP G-Codes  Swallow Current Status (): At least 40 percent but less than 60 percent impaired, limited or restricted  Swallow Goal Status (): At least 40 percent but less than 60 percent impaired, limited or restricted  Swallow Discharge Status (): At least 40 percent but less than 60 percent impaired, limited or restricted        Abbie Zapata MS CCC-SLP  8/6/2018

## 2018-08-09 ENCOUNTER — HOSPITAL ENCOUNTER (OUTPATIENT)
Dept: PHYSICAL THERAPY | Facility: HOSPITAL | Age: 78
Setting detail: THERAPIES SERIES
Discharge: HOME | End: 2018-08-09
Attending: PHYSICAL MEDICINE & REHABILITATION

## 2018-08-09 ENCOUNTER — APPOINTMENT (OUTPATIENT)
Dept: SPEECH THERAPY | Facility: HOSPITAL | Age: 78
End: 2018-08-09

## 2018-08-09 DIAGNOSIS — I63.9 CEREBROVASCULAR ACCIDENT (CVA), UNSPECIFIED MECHANISM (HCC): Primary | ICD-10-CM

## 2018-08-09 DIAGNOSIS — R26.89 FUNCTIONAL GAIT ABNORMALITY: ICD-10-CM

## 2018-08-09 PROCEDURE — 97112 NEUROMUSCULAR REEDUCATION: CPT

## 2018-08-09 NOTE — THERAPY TREATMENT NOTE
Outpatient Physical Therapy Neuro Treatment Note  [facility]     Patient Name: Martín Souza  : 1940  MRN: [id number]  Today's Date: 2018      Visit Date: 2018    Visit Dx:    ICD-10-CM ICD-9-CM   1. Cerebrovascular accident (CVA), unspecified mechanism (CMS/HCC) I63.9 434.91   2. Functional gait abnormality R26.89 781.2       Patient Active Problem List   Diagnosis   • CVA (cerebral vascular accident) (CMS/HCC)                 PT Neuro     Row Name 18 1345             Subjective Comments    Subjective Comments Pt said he is still ambulating some at home without a device. He uses his walker at night and first in the morning citing some medicines he takes makes him dizzy. Pt has not been out ambulating in the neighborhood with cane with his wife yet.   -CHANDRA         Precautions and Contraindications    Precautions/Limitations fall precautions  -CHANDRA         Subjective Pain    Able to rate subjective pain? yes  -CHANDRA      Pre-Treatment Pain Level 0  -CHANDRA      Post-Treatment Pain Level 0  -CHANDRA         Posture/Observations    Posture/Observations Comments Pt is arrived to therapy with rolling walker.   -CHANDRA         Transfers    Sit-Stand [place] (Transfers) conditional independence  -CHANDRA      Stand-Sit [place] (Transfers) conditional independence  -CHANDRA      Transfers, Sit-Stand-Sit, Assist Device straight cane   with Able Tripod Base   -CHANDRA      Transfer, Safety Issues impulsivity  -CHANDRA      Transfer, Impairments strength decreased;impaired balance  -CHANDRA         Gait/Stairs Assessment/Training    [place] Level (Gait) stand by assist  -CHANDRA      Assistive Device (Gait) cane, tripod  -CHANDRA      Distance in Feet (Gait) 250' x 1; 80' x 1  -CHANDRA      Deviations/Abnormal Patterns (Gait) --   cues to slow down;   -CHANDRA      Bilateral Gait Deviations weight shift ability decreased   dec technique with cane at fast speed   -CHANDRA      Comment (Gait/Stairs) Ambulation without assistive device 40' x 3 CG of  one. Rolling walker 80' x 1 conditional indep.   -CHANDRA         Balance Skills Training    Standing-Level of Assistance Contact guard  -CHANDRA      Static Standing Balance Support No upper extremity supported  -CHANDRA      Standing-Balance Activities Semi-tandum   while raising ball up/down; foam wedge  -CHANDRA      Gait Balance-Level of Assistance Contact guard  -CHANDRA      Gait Balance Support Right upper extremity supported;Left upper extremity supported  -CHANDRA      Gait Balance Activities backwards;[place];side-stepping;tandem   pass ball with PT more LOB with this   -CHANDRA      Rhomberg Stood in corner of room: Rhomberg eyes open then head turns and head nods sway but pt able to maintain balance; Rhomberg eyes closed 20 seconds with more sway and then with head turns rebound off wall. Semi heel to toe sway rebound off wall with L foot back .   -CHANDRA        User Key  (r) = Recorded By, (t) = Taken By, (c) = Cosigned By    Initials Name Provider Type    Leslie Casey, PT Physical Therapist                        PT Assessment/Plan     Row Name 08/09/18 1538          PT Assessment    Assessment Comments Concentrated on higher level balance exercises as pt continues to improve. Pt has difficulty with narrow [place], SLS and head turns with ambulation. He needs to continue using cane though and should not ambulate without a device yet.   -CHANDRA       User Key  (r) = Recorded By, (t) = Taken By, (c) = Cosigned By    Initials Name Provider Type    Leslie Casey, PT Physical Therapist                     Exercises     Row Name 08/09/18 1347             Subjective Comments    Subjective Comments Pt said he is still ambulating some at home without a device. He uses his walker at night and first in the morning citing some medicines he takes makes him dizzy. Pt has not been out ambulating in the neighborhood with cane with his wife yet.   -CHANDRA         Subjective Pain    Able to rate subjective pain? yes  -CHANDRA      Pre-Treatment Pain Level 0   -CHANDRA      Post-Treatment Pain Level 0  -CHANDRA         Exercise 7    Exercise Name 7 Stood and tapped L foot on 3 tiles in front of pt and repeat with R. CG of one.   -      Reps 7 8 each   -         Exercise 9    Exercise Name 9 Stand: step forward, lateral and backward with R foot and then repeat with L.   -      Reps 9 4 x each   -CHANDRA        User Key  (r) = Recorded By, (t) = Taken By, (c) = Cosigned By    Initials Name Provider Type    Leslie Casey, PT Physical Therapist                            Therapy Education  Education Details: Rhomberg eyes open/eyes closed and semi heel to toe with head turns in corner of room (written info given). Encouraged pt to ambulate in his neighborhood with his wife with cane. Instructed pt to use cane or walker with ambulation and not to ambulate without device.   Given: HEP  Program: New  How Provided: Verbal, Demonstration, Written  Provided to: Patient  Level of Understanding: Teach back education performed, Verbalized, Demonstrated              Time Calculation:   Start Time: 1345  Stop Time: 1430  Time Calculation (min): 45 min  Total Timed Code Minutes- PT: 45 minute(s)   Therapy Suggested Charges     Code   Minutes Charges    None           Therapy Charges for Today     Code Description Service Date Service Provider Modifiers Qty    78822753620 HC PT NEUROMUSC RE EDUCATION EA 15 MIN 8/9/2018 Leslie Yanez, PT GP 3                    Leslie Yanez, PT  8/9/2018

## 2018-08-13 ENCOUNTER — APPOINTMENT (OUTPATIENT)
Dept: SPEECH THERAPY | Facility: HOSPITAL | Age: 78
End: 2018-08-13

## 2018-08-13 ENCOUNTER — HOSPITAL ENCOUNTER (OUTPATIENT)
Dept: PHYSICAL THERAPY | Facility: HOSPITAL | Age: 78
Setting detail: THERAPIES SERIES
Discharge: HOME | End: 2018-08-13
Attending: PHYSICAL MEDICINE & REHABILITATION

## 2018-08-13 DIAGNOSIS — I63.9 CEREBROVASCULAR ACCIDENT (CVA), UNSPECIFIED MECHANISM (HCC): Primary | ICD-10-CM

## 2018-08-13 DIAGNOSIS — R26.89 FUNCTIONAL GAIT ABNORMALITY: ICD-10-CM

## 2018-08-13 PROCEDURE — 97112 NEUROMUSCULAR REEDUCATION: CPT

## 2018-08-13 NOTE — THERAPY TREATMENT NOTE
Outpatient Physical Therapy Neuro Treatment Note  [facility]     Patient Name: Martín Souza  : 1940  MRN: [id number]  Today's Date: 2018      Visit Date: 2018    Visit Dx:    ICD-10-CM ICD-9-CM   1. Cerebrovascular accident (CVA), unspecified mechanism (CMS/HCC) I63.9 434.91   2. Functional gait abnormality R26.89 781.2       Patient Active Problem List   Diagnosis   • CVA (cerebral vascular accident) (CMS/HCC)                 PT Neuro     Row Name 18 1105             Subjective Comments    Subjective Comments Pt said he was on the floor taking off the baseboard in his bathroom in preparation of painting this weekend. Pt wanted to do it himselft, denying any difficulty getting up off the floor. Mostly ambulating at home without cane. Uses cane in the community.   -CHANDRA         Precautions and Contraindications    Precautions/Limitations fall precautions  -CHANDRA         Subjective Pain    Able to rate subjective pain? yes  -CHANDRA      Pre-Treatment Pain Level 0  -CHANDRA      Post-Treatment Pain Level 0  -CHANDRA         Posture/Observations    Posture/Observations Comments Pt is arrived to therapy with rolling walker.  Pt brought his cane as well.    -CHANDRA         Transfers    Sit-Stand [place] (Transfers) conditional independence  -CHANDRA      Stand-Sit [place] (Transfers) conditional independence  -CHANDRA      Transfers, Sit-Stand-Sit, Assist Device straight cane;other (see comments)   cane with Able Tripod Base.   -CHANDRA      Transfer, Safety Issues impulsivity  -CHANDRA      Transfer, Impairments strength decreased;impaired balance  -CHANDRA      Comment (Transfers) Floor to stand from floor independently. Discussed floor to stand with cane.   -CHANDRA         Gait/Stairs Assessment/Training    [place] Level (Gait) stand by assist  -CHANDRA      Assistive Device (Gait) other (see comments)   no device  -CHANDRA      Distance in Feet (Gait) 80' x 1; 60' x 2  -CHANDRA      Deviations/Abnormal Patterns (Gait) base of support,  wide;other (see comments)   cues to slow down   -CHANDRA      Bilateral Gait Deviations weight shift ability decreased   crossover step x 1 but recovered  -CHANDRA      [place] Level (Stairs) stand by assist;verbal cues   cues to place more of foot on step  -CHANDRA      Handrail Location (Stairs) left side (ascending)  -CHANDRA      Number of Steps (Stairs) 12  -CHANDRA      Ascending Technique (Stairs) step-over-step  -CHANDRA      Descending Technique (Stairs) step-over-step  -CHANDRA      Stairs, Safety Issues sequencing ability decreased  -CHANDRA      Stairs, Impairments strength decreased;impaired balance  -CHANDRA      Comment (Gait/Stairs) Ambulation with cane with Able Tripod base 250' x 1; 180' x 1 supervision.   -CHANDRA         Balance Skills Training    Standing-Level of Assistance Contact guard  -CHANDRA      Static Standing Balance Support No upper extremity supported  -CHANDRA      Standing-Balance Activities Kicking ball;Compliant surfaces;Retrieve object from floor;Trampoline  -CHANDRA      Gait Balance-Level of Assistance Contact guard  -CHANDRA      Gait Balance Support Right upper extremity supported;Left upper extremity supported  -CHANDRA      Gait Balance Activities backwards;[place];scanning environment R/L;side-stepping;tandem   negotiate around cones; scan environment without cane  -CHANDRA      [place] Stood in corner of room: Rhomberg eyes open then head turns and head nods sway but pt able to maintain balance; [place]om[place] eyes closed 20 seconds with more sway and then with head turns rebound off wall. Semi heel to toe sway rebound off wall with L foot back .  -CHANDRA        User Key  (r) = Recorded By, (t) = Taken By, (c) = Cosigned By    Initials Name Provider Type    CHANDRA Leslie Yanez, PT Physical Therapist                        PT Assessment/Plan     Row Name 08/13/18 8715          PT Assessment    Assessment Comments Despite encouragement to use cane, pt still not using it at home. Spent much of session ambulating and balance without assistive device.    "-CHANDRA       User Key  (r) = Recorded By, (t) = Taken By, (c) = Cosigned By    Initials Name Provider Type    Leslie Casey, PT Physical Therapist                     Exercises     Row Name 08/13/18 1105             Subjective Comments    Subjective Comments Pt said he was on the floor taking off the baseboard in his bathroom in preparation of painting this weekend. Pt wanted to do it himselft, denying any difficulty getting up off the floor. Mostly ambulating at home without cane. Uses cane in the community.   -CHANDRA         Subjective Pain    Able to rate subjective pain? yes  -CHANDRA      Pre-Treatment Pain Level 0  -CHANDRA      Post-Treatment Pain Level 0  -CHANDRA         Exercise 3    Exercise Name 3 Steps:  alternate tapping feet on 6\" step   -CHANDRA      Cueing 3 Verbal;Tactile;Demo  -CHANDRA      Reps 3 20  -CHANDRA      Additional Comments explained to pt fully weight shift and maintain balance on stance leg  -CHANDRA         Exercise 10    Exercise Name 10 Stand by hemibars with moving ball back/forth with one foot, maintain balance on opposite foot.   -CHANDRA      Reps 10 10 each LE   -CHANDRA        User Key  (r) = Recorded By, (t) = Taken By, (c) = Cosigned By    Initials Name Provider Type    Leslie Casey PT Physical Therapist                            Therapy Education  Education Details: Encouraged pt to do [place] and semi heel to toe exercise issued last session. Floor to stand transfer.   Given: HEP, Fall prevention and home safety  How Provided: Verbal, Demonstration  Provided to: Patient  Level of Understanding: Teach back education performed, Verbalized, Demonstrated              Time Calculation:   Start Time: 1105  Stop Time: 1148  Time Calculation (min): 43 min  Total Timed Code Minutes- PT: 43 minute(s)   Therapy Suggested Charges     Code   Minutes Charges    None           Therapy Charges for Today     Code Description Service Date Service Provider Modifiers Qty    73250212624  PT NEUROMUSC RE EDUCATION EA 15 MIN " 8/13/2018 Leslie Yanez, PT GP 3                    Leslie Yanez, PT  8/13/2018

## 2018-08-16 ENCOUNTER — APPOINTMENT (OUTPATIENT)
Dept: SPEECH THERAPY | Facility: HOSPITAL | Age: 78
End: 2018-08-16

## 2018-08-16 ENCOUNTER — HOSPITAL ENCOUNTER (OUTPATIENT)
Dept: PHYSICAL THERAPY | Facility: HOSPITAL | Age: 78
Setting detail: THERAPIES SERIES
Discharge: HOME | End: 2018-08-16
Attending: PHYSICAL MEDICINE & REHABILITATION

## 2018-08-16 DIAGNOSIS — R26.89 FUNCTIONAL GAIT ABNORMALITY: ICD-10-CM

## 2018-08-16 DIAGNOSIS — I63.9 CEREBROVASCULAR ACCIDENT (CVA), UNSPECIFIED MECHANISM (HCC): Primary | ICD-10-CM

## 2018-08-16 PROCEDURE — 97112 NEUROMUSCULAR REEDUCATION: CPT

## 2018-08-16 NOTE — THERAPY TREATMENT NOTE
Outpatient Physical Therapy Neuro Treatment Note  [facility]     Patient Name: Martín Souza  : 1940  MRN: [id number]  Today's Date: 2018      Visit Date: 2018    Visit Dx:    ICD-10-CM ICD-9-CM   1. Cerebrovascular accident (CVA), unspecified mechanism (CMS/[facility]) I63.9 434.91   2. Functional gait abnormality R26.89 781.2       Patient Active Problem List   Diagnosis   • CVA (cerebral vascular accident) (CMS/[facility])                 PT Neuro     Row Name 18 1348             Subjective Comments    Subjective Comments Pt said he has not used his cane at home today.   -CHANDRA         Precautions and Contraindications    Precautions/Limitations fall precautions  -CHANDRA         Subjective Pain    Subjective Pain Comment did not mention pain   -CHANDRA         Posture/Observations    Posture/Observations Comments Pt is arrived to therapy with cane with Able Tripod Base.   -CHANDRA         Transfers    Sit-Stand [place] (Transfers) conditional independence  -CHANDRA      Stand-Sit [place] (Transfers) conditional independence  -CHANDRA      Transfers, Sit-Stand-Sit, Assist Device straight cane;other (see comments)   with Able Tripod Base and no device   -CHANDRA      Transfer, Safety Issues impulsivity  -CHANDRA      Transfer, Impairments impaired balance  -CHANDRA         Gait/Stairs Assessment/Training    [place] Level (Gait) supervision  -CHANDRA      Assistive Device (Gait) other (see comments)   no device  -CHANDRA      Distance in Feet (Gait) 250, 150  -CHANDRA      Deviations/Abnormal Patterns (Gait) base of support, wide  -CHANDRA      Bilateral Gait Deviations weight shift ability decreased  -CHANDRA      Left Sided Gait Deviations --   occasional drift to L especially with head turns   -CHANDRA      [place] Level (Stairs) supervision  -CHANDRA      Assistive Device (Stairs) other (see comments)   no device  -CHANDRA      Handrail Location (Stairs) right side (ascending)  -CHANDRA      Number of Steps (Stairs) 12  -CHANDRA      Ascending Technique (Stairs)  step-over-step  -CHANDRA      Descending Technique (Stairs) step-over-step  -CHANDRA      Stairs, Impairments impaired balance  -CHANDRA      Comment (Gait/Stairs) Curb with cane SBA   -CHANDRA         Balance Skills Training    Standing-Level of Assistance Close supervision;Contact guard  -CHANDRA      Static Standing Balance Support No upper extremity supported  -CHANDRA      Standing-Balance Activities Kicking ball;Trampoline;Retrieve object from floor   corn hole toss; toss ball & dribble ball with ambulation    -CHANDRA      Gait Balance-Level of Assistance Contact guard;Close supervision  -CHANDRA      Gait Balance Support No upper extremity supported  -CHANDRA      Gait Balance Activities backwards;side-stepping;scanning environment R/L;stepping over object;uneven surface   red mat; low hurdles; drift L but recovered with head turns  -CHANDRA        User Key  (r) = Recorded By, (t) = Taken By, (c) = Cosigned By    Initials Name Provider Type    Leslie Casey, PT Physical Therapist                        PT Assessment/Plan     Row Name 08/16/18 1521          PT Assessment    Assessment Comments Pt was able to do even more higher level balance activities today such as corn hole, kick ball to wall. Pt's balance is challenged the most with head turns horizontally, single leg stance or turns.   -CHANDRA       User Key  (r) = Recorded By, (t) = Taken By, (c) = Cosigned By    Initials Name Provider Type    Leslie Casey, PT Physical Therapist                     Exercises     Row Name 08/16/18 1348             Subjective Comments    Subjective Comments Pt said he has not used his cane at home today.   -CHANDRA         Subjective Pain    Subjective Pain Comment did not mention pain   -CHANDRA         Exercise 7    Exercise Name 7 Stood and tapped L foot on 3 tiles in front of pt and repeat with R. CG of one.   -CHANDRA      Cueing 7 Verbal;Tactile;Demo  -CHANDRA      Reps 7 8 each   -CHANDRA         Exercise 9    Exercise Name 9 Stand: step forward, lateral and backward with R foot  and then repeat with L.   -CHANDRA      Reps 9 4 x each   -CHANDRA        User Key  (r) = Recorded By, (t) = Taken By, (c) = Cosigned By    Initials Name Provider Type    Leslie Casey, PT Physical Therapist                            Therapy Education  Education Details: Encouraged pt to take a couple of steps with turns vs just spin with turn and practice ambulation with head turns.   Given: HEP, Mobility training  How Provided: Verbal  Provided to: Patient, Caregiver  Level of Understanding: Teach back education performed, Verbalized              Time Calculation:   Start Time: 1348  Stop Time: 1432  Time Calculation (min): 44 min  Total Timed Code Minutes- PT: 44 minute(s)   Therapy Suggested Charges     Code   Minutes Charges    None           Therapy Charges for Today     Code Description Service Date Service Provider Modifiers Qty    76240528495 HC PT NEUROMUSC RE EDUCATION EA 15 MIN 8/16/2018 Leslie Yanez, PT GP 3                    Leslie Yanez PT  8/16/2018

## 2018-08-20 ENCOUNTER — HOSPITAL ENCOUNTER (OUTPATIENT)
Dept: PHYSICAL THERAPY | Facility: HOSPITAL | Age: 78
Setting detail: THERAPIES SERIES
Discharge: HOME | End: 2018-08-20
Attending: PHYSICAL MEDICINE & REHABILITATION

## 2018-08-20 ENCOUNTER — APPOINTMENT (OUTPATIENT)
Dept: SPEECH THERAPY | Facility: HOSPITAL | Age: 78
End: 2018-08-20

## 2018-08-20 DIAGNOSIS — R26.89 FUNCTIONAL GAIT ABNORMALITY: ICD-10-CM

## 2018-08-20 DIAGNOSIS — I63.9 CEREBROVASCULAR ACCIDENT (CVA), UNSPECIFIED MECHANISM (HCC): Primary | ICD-10-CM

## 2018-08-20 PROCEDURE — 97112 NEUROMUSCULAR REEDUCATION: CPT

## 2018-08-20 PROCEDURE — G8978 MOBILITY CURRENT STATUS: HCPCS

## 2018-08-20 PROCEDURE — G8979 MOBILITY GOAL STATUS: HCPCS

## 2018-08-23 ENCOUNTER — APPOINTMENT (OUTPATIENT)
Dept: PHYSICAL THERAPY | Facility: HOSPITAL | Age: 78
End: 2018-08-23
Attending: PHYSICAL MEDICINE & REHABILITATION

## 2018-08-23 ENCOUNTER — APPOINTMENT (OUTPATIENT)
Dept: SPEECH THERAPY | Facility: HOSPITAL | Age: 78
End: 2018-08-23

## 2018-08-27 ENCOUNTER — APPOINTMENT (OUTPATIENT)
Dept: PHYSICAL THERAPY | Facility: HOSPITAL | Age: 78
End: 2018-08-27
Attending: PHYSICAL MEDICINE & REHABILITATION

## 2018-08-27 ENCOUNTER — APPOINTMENT (OUTPATIENT)
Dept: SPEECH THERAPY | Facility: HOSPITAL | Age: 78
End: 2018-08-27

## 2018-08-30 ENCOUNTER — APPOINTMENT (OUTPATIENT)
Dept: SPEECH THERAPY | Facility: HOSPITAL | Age: 78
End: 2018-08-30

## 2018-08-30 ENCOUNTER — HOSPITAL ENCOUNTER (OUTPATIENT)
Dept: PHYSICAL THERAPY | Facility: HOSPITAL | Age: 78
Setting detail: THERAPIES SERIES
Discharge: HOME | End: 2018-08-30
Attending: PHYSICAL MEDICINE & REHABILITATION

## 2018-08-30 DIAGNOSIS — I63.9 CEREBROVASCULAR ACCIDENT (CVA), UNSPECIFIED MECHANISM (HCC): Primary | ICD-10-CM

## 2018-08-30 DIAGNOSIS — R26.89 FUNCTIONAL GAIT ABNORMALITY: ICD-10-CM

## 2018-08-30 PROCEDURE — 97112 NEUROMUSCULAR REEDUCATION: CPT

## 2018-09-06 ENCOUNTER — HOSPITAL ENCOUNTER (OUTPATIENT)
Dept: PHYSICAL THERAPY | Facility: HOSPITAL | Age: 78
Setting detail: THERAPIES SERIES
Discharge: HOME | End: 2018-09-06
Attending: PHYSICAL MEDICINE & REHABILITATION

## 2018-09-06 ENCOUNTER — APPOINTMENT (OUTPATIENT)
Dept: SPEECH THERAPY | Facility: HOSPITAL | Age: 78
End: 2018-09-06

## 2018-09-06 DIAGNOSIS — I63.9 CEREBROVASCULAR ACCIDENT (CVA), UNSPECIFIED MECHANISM (HCC): Primary | ICD-10-CM

## 2018-09-06 DIAGNOSIS — R26.89 FUNCTIONAL GAIT ABNORMALITY: ICD-10-CM

## 2018-09-06 PROCEDURE — 97112 NEUROMUSCULAR REEDUCATION: CPT

## 2018-09-10 ENCOUNTER — APPOINTMENT (OUTPATIENT)
Dept: PHYSICAL THERAPY | Facility: HOSPITAL | Age: 78
End: 2018-09-10
Attending: PHYSICAL MEDICINE & REHABILITATION

## 2018-09-10 ENCOUNTER — APPOINTMENT (OUTPATIENT)
Dept: SPEECH THERAPY | Facility: HOSPITAL | Age: 78
End: 2018-09-10

## 2018-09-13 ENCOUNTER — HOSPITAL ENCOUNTER (OUTPATIENT)
Dept: PHYSICAL THERAPY | Facility: HOSPITAL | Age: 78
Setting detail: THERAPIES SERIES
Discharge: HOME | End: 2018-09-13

## 2018-09-13 DIAGNOSIS — I63.9 CEREBROVASCULAR ACCIDENT (CVA), UNSPECIFIED MECHANISM (HCC): Primary | ICD-10-CM

## 2018-09-13 DIAGNOSIS — R26.89 FUNCTIONAL GAIT ABNORMALITY: ICD-10-CM

## 2018-09-13 PROCEDURE — 97112 NEUROMUSCULAR REEDUCATION: CPT

## 2018-09-13 NOTE — THERAPY TREATMENT NOTE
Outpatient Physical Therapy Neuro Treatment Note  [facility]     Patient Name: Martín Souza  : 1940  MRN: [id number]  Today's Date: 2018      Visit Date: 2018    Visit Dx:    ICD-10-CM ICD-9-CM   1. Cerebrovascular accident (CVA), unspecified mechanism (CMS/[facility]) I63.9 434.91   2. Functional gait abnormality R26.89 781.2       Patient Active Problem List   Diagnosis   • CVA (cerebral vascular accident) (CMS/[facility])                 PT Neuro     Row Name 18 1345             Subjective Comments    Subjective Comments Pt denies falls. He said he has not been exercising much though.   -CHANDRA         Precautions and Contraindications    Precautions/Limitations fall precautions  -CHANDRA         Subjective Pain    Able to rate subjective pain? yes  -CHANDRA      Pre-Treatment Pain Level 0  -CHANDRA      Post-Treatment Pain Level 0  -CHANDRA         Posture/Observations    Posture/Observations Comments Pt is arrived to therapy without assistive device.   -CHANDRA         Transfers    Sit-Stand [place] (Transfers) conditional independence  -CHANDRA      Stand-Sit [place] (Transfers) conditional independence  -CHANDRA         Gait/Stairs Assessment/Training    [place] Level (Gait) conditional independence  -CHANDRA      Distance in Feet (Gait) 180' x 2  -CHANDRA      Deviations/Abnormal Patterns (Gait) --   no gait deviations   -CHANDRA      Comment (Gait/Stairs) 1x LOB with quick turn around but pt recovered by reaching for table. Curb Independent.   -CHANDRA         Balance Skills Training    Standing-Level of Assistance Distant supervision  -CHANDRA      Static Standing Balance Support No upper extremity supported  -CHANDRA      Standing-Balance Activities Ball toss;Kicking ball;Trampoline   blue foam beam   -CHANDRA      Gait Balance-Level of Assistance Distant supervision  -CHANDRA      Gait Balance Support No upper extremity supported  -CHANDRA      Gait Balance Activities backwards;scanning environment R/L;side-stepping;uneven surface   dribble ball  -CHANDRA   "    [place] Stood in corner of room, [place] on pad rebound off wall to maintain balance with head turns.   -CHANDRA        User Key  (r) = Recorded By, (t) = Taken By, (c) = Cosigned By    Initials Name Provider Type    Leslie Casey, PT Physical Therapist                        PT Assessment/Plan     Row Name 09/13/18 1713          PT Assessment    Assessment Comments Pt ambulating without LOB and no weaving with head turns. Plan on d/c at next visit.   -CHANDRA       User Key  (r) = Recorded By, (t) = Taken By, (c) = Cosigned By    Initials Name Provider Type    Leslie Casey, PT Physical Therapist                     Exercises     Row Name 09/13/18 1345             Subjective Comments    Subjective Comments Pt denies falls. He said he has not been exercising much though.   -CHANDRA         Subjective Pain    Able to rate subjective pain? yes  -CHANDRA      Pre-Treatment Pain Level 0  -CHANDRA      Post-Treatment Pain Level 0  -CHANDRA         Exercise 2    Exercise Name 2 Hemibar: sidestep and then MIP with blue theraband around lower thighs  -CHANDRA      Reps 2 4 x 10'  -CHANDRA         Exercise 3    Exercise Name 3 Steps:  alternate tapping feet on 6\" step   -CHANDRA      Cueing 3 Verbal;Tactile;Demo  -CHANDRA      Reps 3 20  -CHANDRA         Exercise 6    Exercise Name 6 Ambulate with blue theraband around pelvis forward and then side step to L and R   -CHANDRA        User Key  (r) = Recorded By, (t) = Taken By, (c) = Cosigned By    Initials Name Provider Type    Leslie Casey, PT Physical Therapist                            Therapy Education  Education Details: Encouraged pt to try to do sidestep and MIP with blue theraband around thighs.   How Provided: Verbal, Demonstration  Provided to: Patient  Level of Understanding: Teach back education performed, Verbalized, Demonstrated              Time Calculation:   Start Time: 1345  Stop Time: 1430  Time Calculation (min): 45 min  Total Timed Code Minutes- PT: 45 minute(s)   Therapy Suggested " Charges     Code   Minutes Charges    None           Therapy Charges for Today     Code Description Service Date Service Provider Modifiers Qty    90626448941 HC PT NEUROMUSC RE EDUCATION EA 15 MIN 9/13/2018 Leslie Yanez, PT GP 3                    Leslie Yanez, PT  9/13/2018

## 2018-09-20 ENCOUNTER — HOSPITAL ENCOUNTER (OUTPATIENT)
Dept: PHYSICAL THERAPY | Facility: HOSPITAL | Age: 78
Setting detail: THERAPIES SERIES
Discharge: HOME | End: 2018-09-20

## 2018-09-20 DIAGNOSIS — I63.9 CEREBROVASCULAR ACCIDENT (CVA), UNSPECIFIED MECHANISM (HCC): Primary | ICD-10-CM

## 2018-09-20 DIAGNOSIS — R26.89 FUNCTIONAL GAIT ABNORMALITY: ICD-10-CM

## 2018-09-20 PROCEDURE — G8980 MOBILITY D/C STATUS: HCPCS

## 2018-09-20 PROCEDURE — 97112 NEUROMUSCULAR REEDUCATION: CPT

## 2018-09-20 PROCEDURE — G8979 MOBILITY GOAL STATUS: HCPCS

## 2018-09-20 NOTE — THERAPY DISCHARGE NOTE
Outpatient Physical Therapy Neuro Treatment Note/Discharge Summary  [facility]     Patient Name: Martín Souza  : 1940  MRN: [id number]  Today's Date: 2018      Visit Date: 2018    Visit Dx:    ICD-10-CM ICD-9-CM   1. Cerebrovascular accident (CVA), unspecified mechanism (CMS/[facility]) I63.9 434.91   2. Functional gait abnormality R26.89 781.2       Patient Active Problem List   Diagnosis   • CVA (cerebral vascular accident) (CMS/[facility])                  PT Neuro     Row Name 18 1344             Subjective Comments    Subjective Comments Pt denies falls. He reports he and wife plan on starting at a gym near them to work out. Pt denies any concerns about d/c PT at this time.   -CHANDRA         Precautions and Contraindications    Precautions/Limitations fall precautions  -CHANDRA         Subjective Pain    Able to rate subjective pain? yes  -CHANDRA      Pre-Treatment Pain Level 1  -CHANDRA      Post-Treatment Pain Level 1  -CHANDRA      Subjective Pain Comment L thigh after sitting on floor to put a desk together at home.   -CHANDRA         Cognition    Overall Cognitive Status WFL  -CHANDRA      Arousal/Alertness Appropriate responses to stimuli  -CHANDRA      Memory Appears intact  -CHANDRA      Orientation Level Oriented X4  -CHANDRA      Safety Judgment Good awareness of safety precautions  -CHANDRA      Deficits Fully aware of deficits  -CHANDRA      Comments Pt said he still uses his cane when he is fatigued or when he is walking far.   -CHANDRA         Sensation    Additional Comments c/o entire L side, UE/LE was numb  -CHANDRA         Posture/Observations    Posture/Observations Comments Pt is arrived to therapy without assistive device.   -CHANDRA         Lower Extremity (Manual Muscle Testing)    Comment, MMT: Lower Extremity R LE: hip flexion 4+/5, hip abduction 4/5, knee ext 4+/5, knee flex 4/5, ankle DF and inver/ever 4+/5;  L LE hip flex 4+/5, hip abd 4/5, knee ext 4+/5, knee flex 4/5, ankle DF and inver/ever 4+/5.   -CHANDRA         Bed Mobility  Assessment/Treatment    Bed Mobility Assessment/Treatment bed mobility (all) activities  -CHANDRA      [place] Level (Bed Mobility) independent  -CHANDRA         Transfers    Sit-Stand [place] (Transfers) conditional independence  -CHANDRA      Stand-Sit [place] (Transfers) conditional independence  -CHANDRA         Gait/Stairs Assessment/Training    [place] Level (Gait) conditional independence  -CHANDRA      Distance in Feet (Gait) 400' outdoors on sidewalk, incline, grass, curb; indoors -- 180', 100'  -CHANDRA      Deviations/Abnormal Patterns (Gait) other (see comments)   he had occasional drift to L but recovered bal indep  -CHANDRA      [place] Level (Stairs) conditional independence  -CHANDRA      Assistive Device (Stairs) other (see comments)   none  -CHANDRA      Handrail Location (Stairs) right side (ascending)  -CHANDRA      Number of Steps (Stairs) 12  -CHANDRA      Ascending Technique (Stairs) step-over-step  -CHANDRA      Descending Technique (Stairs) step-over-step  -CHANDRA      Comment (Gait/Stairs) Curb: independence.   -CHANDRA         Balance Skills Training    Balance Comments see MENDOZA, TUG, and DGI  -CHANDRA        User Key  (r) = Recorded By, (t) = Taken By, (c) = Cosigned By    Initials Name Provider Type    Leslie Casey, PT Physical Therapist                        PT Assessment/Plan     Row Name 09/20/18 5569          PT Assessment    Assessment Comments Pt has had increased strength of L LE since PT began by 1/2 muscle grade but still c/o numbness entire L side. Pt is independent with bed mobility, transfers and ambulation without assistive device. Pt has improved balance with 51/56 on the MENDOZA, 22/24 on the DGI and performed the TUG in 9 seconds without a device. Pt has met all PT goals and is ready for d/c from PT at this time.   -CHANDRA       User Key  (r) = Recorded By, (t) = Taken By, (c) = Cosigned By    Initials Name Provider Type    Leslie Casey, PT Physical Therapist                     Exercises     Row Name 09/20/18  1344             Subjective Comments    Subjective Comments Pt denies falls. He reports he and wife plan on starting at a gym near them to work out. Pt denies any concerns about d/c PT at this time.   -CHANDRA         Subjective Pain    Able to rate subjective pain? yes  -CHANDRA      Pre-Treatment Pain Level 1  -CHANDRA      Post-Treatment Pain Level 1  -CHANDRA      Subjective Pain Comment L thigh after sitting on floor to put a desk together at home.   -        User Key  (r) = Recorded By, (t) = Taken By, (c) = Cosigned By    Initials Name Provider Type    Leslie Casey, PT Physical Therapist                              PT OP Goals     Row Name 09/20/18 1344          PT Short Term Goals    STG 1 Pt will transfer sit to stand and stand to sit slowly and correctly without hold onto device and turn fully prior to sit 90% of the time.   -     STG 1 Progress Met  -CHANDRA     STG 2 Pt will be independent with advanced HEP to improve strength and balance.   -     STG 2 Progress Met  -CHANDRA     STG 3 Pt will be able to stand without assistive device for 2 minutes SBA while batting balloon or catching ball for improved standing balance.   -     STG 3 Progress Met  -CHANDRA     STG 4 Pt will perform semi heel to toe eyes open for 30 seconds independently.   -     STG 4 Progress Met  -CHANDRA     STG 5 Pt will be able to ambulate without device independently while turning head side to side for 100'.   -     STG 5 Progress Met  -CHANDRA     STG 5 Progress Comments Pt still has occasional drift to L with horizontal head turns but is able to recover balance independently.   -     STG 6 Pt to score at least 38/56 on the MENDOZA balance test to demonstrate improved balance and decreased fall risk.   -     STG 6 Progress Met  -CHANDRA     STG 6 Progress Comments 51/56 on retest today   -        Long Term Goals    LTG 1 Pt to score at least 45/56 on the MENDOZA balance test to demonstrate improved balance and decreased fall risk.   -     LTG 1 Progress  "Met  -CHANDRA     LTG 1 Progress Comments 51/56 on retest today  -CHANDRA     LTG 2 Pt will complete the TUG test in less than 11 seconds using cane for improved functional mobility and decreased fall risk.   -CHANDRA     LTG 2 Progress Met  -CHANDRA     LTG 2 Progress Comments completed TUG today in 9 seconds without a device   -CHANDRA     LTG 3 Pt will ambulate in the community with least restrictive device up to 400' independently.   -CHANDRA     LTG 3 Progress Met  -CHANDRA     LTG 3 Progress Comments indep today on grass, curb, sidewalk, incline -- no device   -CHANDRA     LTG 4 Pt will ascend/descend 6\" curb independently with cane.   -CHANDRA     LTG 4 Progress Met  -CHANDRA     LTG 4 Progress Comments met with pt performing indep without cane.   -CHANDRA     LTG 5 Pt to ascend/descend 4 steps with rail and cane independently.   -CHANDRA     LTG 5 Progress Met  -CHANDRA     LTG 5 Progress Comments indep with rail and no device   -CHANDRA     LTG 6 Pt to have increased strength L hip flexion and abduction by 1/2 muscle grade.   -CHANDRA     LTG 6 Progress Met  -CHANDRA     LTG 6 Progress Comments overall increase by 1/2 muscle grade   -CHANDRA     LTG 7 Pt to have increased strength L knee flexion to 4/5.   -CHANDRA     LTG 7 Progress Met  -CHANDRA     LTG 7 Progress Comments 4/5 knee flexors   -CHANDRA     LTG 8 Pt to score at least 20/24 on the Dynamic Gait Index for improved balance with gait related tasks and decreased fall risk.   -CHANDRA     LTG 8 Progress Met  -CHANDRA     LTG 8 Progress Comments 22/24 on retest today   -CHANDRA       User Key  (r) = Recorded By, (t) = Taken By, (c) = Cosigned By    Initials Name Provider Type    Leslie Casey, PT Physical Therapist          Therapy Education  Education Details: Discussed pt looking into joining a gym.   Given: Mobility training  How Provided: Verbal  Provided to: Patient  Level of Understanding: Teach back education performed, Verbalized    Outcome Measure Options: Siddiqui Balance, Dynamic Gait Index, Timed Up and Go (TUG)  Siddiqui Balance Scale  Sitting to " Standing: able to stand without using hands and stabilize independently  Standing Unsupported: able to stand safely for 2 minutes  Sitting with Back Unsupported but Feet Supported on Floor or on Stool: able to sit safely and securely for 2 minutes  Standing to Sitting: sits safely with minimal use of hands  Transfers: able to transfer safely with minor use of hands  Standing Unsupported with Eyes Closed: able to stand 10 seconds safely  Standing Unsupported with Feet Together: able to place feet together independently and stand 1 minute safely  Reaching Forward with Outstretched Arm While Standing: can reach forward confidently 25 cm (10 inches)   Object From the Floor From a Standing Position: able to  object safely and easily  Turning to Look Behind Over Left and Right Shoulders While Standing: looks behind from both sides and weight shifts well  Turn 360 Degrees: able to turn 360 degrees safely in 4 seconds or less  Place Alternate Foot on Step or Stool While Standing Unsupported: able to stand independently and safely and complete 8 steps in 20 seconds  Standing Unsupported with One Foot in Front: able to take small step independently and hold 30 seconds  Standing on One Leg: tries to lift leg unable to hold 3 seconds but remains standing independently  Siddiqui Total Score: 51  Dynamic Gait Index (DGI)  Gait Level Surface: Normal: walks 20’, no assistive devices, good speed, no evidence for imbalance, normal gait pattern  Change in Gait Speed: Normal: Able to smoothly change walking speed without loss of balance or gait deviation. Shows significant difference in walking speeds between normal, fast and slow paces.  Gait with Horizontal Head Turns: Mild impairment: Performs head turns smoothly with slight change in gait velocity, i.e. minor disruption to smooth gait path or uses walking aid.  Gait with Vertical Head Turns: Normal: Performs head turns smoothly with no change in gait.  Gait and Pivot  Turn: Normal: Pivot turns safely within 3 seconds and stops quickly with no loss of balance.  Step Over Obstacle: Normal: Is able to step over box without changing gait speed, no evidence for imbalance.  Step Around Obstacles: Normal: Is able to walk safely around cones safely without changing gait speed, no evidence of imbalance.  Steps: Mild impairment: Alternating feet, must use rail.  Dynamic Gait Index Score: 22  Dynamic Gait Index Comments: no device   Timed Up and Go (TUG)  TUG Test 1: 9 seconds (no device )    Time Calculation:   Start Time: 1344  Stop Time: 1425  Time Calculation (min): 41 min  Total Timed Code Minutes- PT: 41 minute(s)   Therapy Suggested Charges     Code   Minutes Charges    None           Therapy Charges for Today     Code Description Service Date Service Provider Modifiers Qty    87535005829 HC PT MOBILITY PROJECTED 9/20/2018 Leslie Yanez, PT GP, CI 1    [id number] HC PT MOBILITY DISCHARGE 9/20/2018 Leslie Yanez, PT GP, CI 1    [id number] HC PT NEUROMUSC RE EDUCATION EA 15 MIN 9/20/2018 Leslie Yanez, PT GP 3          PT G-Codes  PT Professional Judgement Used?: Yes  Outcome Measure Options: Siddiqui Balance, Dynamic Gait Index, Timed Up and Go (TUG)  Siddiqui Total Score: 51  TUG Test 1: 9 seconds (no device )  Functional Limitation: Mobility: Walking and moving around  Mobility: Walking and Moving Around Goal Status (): At least 1 percent but less than 20 percent impaired, limited or restricted  Mobility: Walking and Moving Around Discharge Status (): At least 1 percent but less than 20 percent impaired, limited or restricted     OP PT Discharge Summary  Date of Discharge: 09/20/18  Reason for Discharge: All goals achieved  Outcomes Achieved: Able to achieve all goals within established timeline  Discharge Destination: Home without follow-up, Home with home program        Leslie Yanez, PT  9/20/2018

## 2018-09-27 ENCOUNTER — APPOINTMENT (OUTPATIENT)
Dept: PHYSICAL THERAPY | Facility: HOSPITAL | Age: 78
End: 2018-09-27

## 2018-10-04 ENCOUNTER — APPOINTMENT (OUTPATIENT)
Dept: PHYSICAL THERAPY | Facility: HOSPITAL | Age: 78
End: 2018-10-04

## 2018-10-11 ENCOUNTER — APPOINTMENT (OUTPATIENT)
Dept: PHYSICAL THERAPY | Facility: HOSPITAL | Age: 78
End: 2018-10-11

## 2018-10-18 ENCOUNTER — APPOINTMENT (OUTPATIENT)
Dept: PHYSICAL THERAPY | Facility: HOSPITAL | Age: 78
End: 2018-10-18